# Patient Record
Sex: FEMALE | Race: WHITE | Employment: FULL TIME | URBAN - METROPOLITAN AREA
[De-identification: names, ages, dates, MRNs, and addresses within clinical notes are randomized per-mention and may not be internally consistent; named-entity substitution may affect disease eponyms.]

---

## 2017-03-07 ENCOUNTER — OFFICE VISIT (OUTPATIENT)
Dept: PRIMARY CARE CLINIC | Age: 57
End: 2017-03-07

## 2017-03-07 VITALS
RESPIRATION RATE: 18 BRPM | HEIGHT: 62 IN | WEIGHT: 111 LBS | BODY MASS INDEX: 20.43 KG/M2 | OXYGEN SATURATION: 100 % | TEMPERATURE: 98 F | SYSTOLIC BLOOD PRESSURE: 102 MMHG | HEART RATE: 78 BPM | DIASTOLIC BLOOD PRESSURE: 68 MMHG

## 2017-03-07 DIAGNOSIS — R59.9 SWOLLEN LYMPH NODES: ICD-10-CM

## 2017-03-07 DIAGNOSIS — J02.9 PHARYNGITIS, UNSPECIFIED ETIOLOGY: ICD-10-CM

## 2017-03-07 DIAGNOSIS — J02.9 SORE THROAT: Primary | ICD-10-CM

## 2017-03-07 LAB
S PYO AG THROAT QL: NEGATIVE
VALID INTERNAL CONTROL?: YES

## 2017-03-08 NOTE — PATIENT INSTRUCTIONS
Swollen Lymph Nodes: Care Instructions  Your Care Instructions  Lymph nodes are small, bean-shaped glands throughout the body. They help your body fight germs and infections. Lymph nodes often swell when there is a problem such as an injury, infection, or tumor. · The nodes in your neck, under your chin, or behind your ears may swell when you have a cold or sore throat. · An injury or infection in a leg or foot can make the nodes in your groin swell. · Sometimes medicine can make lymph nodes swell, but this is rare. Treatment depends on what caused your nodes to swell. Usually the nodes return to normal size without a problem. Follow-up care is a key part of your treatment and safety. Be sure to make and go to all appointments, and call your doctor if you are having problems. Its also a good idea to know your test results and keep a list of the medicines you take. How can you care for yourself at home? · Take your medicines exactly as prescribed. Call your doctor if you think you are having a problem with your medicine. · Avoid irritation. ¨ Do not squeeze or pick at the lump. ¨ Do not stick a needle in it. · Prevent infection. Do not squeeze, drain, or puncture a painful lump. Doing this can irritate or inflame the lump, push any existing infection deeper into the skin, or cause severe bleeding. · Get extra rest. Slow down just a little from your usual routine. · Drink plenty of fluids, enough so that your urine is light yellow or clear like water. If you have kidney, heart, or liver disease and have to limit fluids, talk with your doctor before you increase the amount of fluids you drink. · Take an over-the-counter pain medicine, such as acetaminophen (Tylenol), ibuprofen (Advil, Motrin), or naproxen (Aleve). Read and follow all instructions on the label. · Do not take two or more pain medicines at the same time unless the doctor told you to.  Many pain medicines have acetaminophen, which is Tylenol. Too much acetaminophen (Tylenol) can be harmful. When should you call for help? Watch closely for changes in your health, and be sure to contact your doctor if:  · Your lymph nodes do not get smaller, or they get bigger. · The area becomes red and feels tender. · The nodes feel hard and do not move when you push on them. · You have a fever of 100° F.  · You have night sweats. · You lose weight without trying. Where can you learn more? Go to http://efrain-virgen.info/. Enter T332 in the search box to learn more about \"Swollen Lymph Nodes: Care Instructions. \"  Current as of: May 24, 2016  Content Version: 11.1  © 6112-0011 [employer], [employer]. Care instructions adapted under license by [employer] (which disclaims liability or warranty for this information). If you have questions about a medical condition or this instruction, always ask your healthcare professional. [place] [address] any warranty or liability for your use of this information.

## 2017-03-08 NOTE — PROGRESS NOTES
Ms. Parveen Singh is a 60y.o. year old female who had concerns including Sore Throat. HPI:  Chief Complaint   Patient presents with    Sore Throat     hurts to swallow   right sided throat pain   No fever or chills or body aches. Past Medical History:   Diagnosis Date    Anxiety     Endocrine disease     hypothyroid    Thyroid disease      Current Outpatient Prescriptions   Medication Sig Dispense    SYNTHROID 75 mcg tablet      venlafaxine-SR (EFFEXOR-XR) 75 mg capsule      acyclovir (ZOVIRAX) 800 mg tablet Take 800 mg by mouth every four (4) hours.  ondansetron (ZOFRAN ODT) 4 mg disintegrating tablet Take 1 Tab by mouth every eight (8) hours as needed for Nausea. 15 Tab    fluticasone (FLONASE) 50 mcg/actuation nasal spray 2 Sprays by Both Nostrils route daily. 1 Bottle    ESTRACE 0.01 % (0.1 mg/gram) vaginal cream      venlafaxine-SR (EFFEXOR-XR) 37.5 mg capsule      levothyroxine (SYNTHROID) 25 mcg tablet Take  by mouth Daily (before breakfast). No current facility-administered medications for this visit. Reviewed PmHx, RxHx, FmHx, SocHx, AllgHx and updated and dated in the chart. ROS: Negative except for BOLD  General: fever, chills, fatigue  Respiratory: cough, SOB, wheezing  Cardiovascular:  CP, palpitation, DENNIS, edema   Gastrointestinal: N/V/D, bleeding  Genito-Urinary: dysuria, hematuria  Musculoskeletal: muscle weakness, pain, swelling    OBJECTIVE:   Visit Vitals    /68 (BP 1 Location: Left arm, BP Patient Position: Sitting)    Pulse 78    Temp 98 °F (36.7 °C) (Oral)    Resp 18    Ht 5' 2\" (1.575 m)    Wt 111 lb (50.3 kg)    SpO2 100%    BMI 20.3 kg/m2     GEN: The patient appears well, alert, oriented x 3, in no distress. ENT: bilateral TM and canal normal.  Neck supple. Right submandibular adenopathy, no thyromegaly. DAREN. No post pharyngeal exudate  Lungs: clear bilaterally, good air entry, no wheezes, rhonchi or rales.    Cardiovascular: regular rate and rhythm. S1 and S2 normal, no murmurs,  Abdomen: + BS, soft without tenderness, guarding, rebound, mass or organomegaly. Extremities: no edema, normal peripheral pulses. .      Assessment/ Plan:       ICD-10-CM ICD-9-CM    1. Sore throat J02.9 462 AMB POC RAPID STREP A   2. Pharyngitis, unspecified etiology J02.9 462    3. Swollen lymph nodes R59.9 785.6        Strep is negative. Sx care. I have discussed the diagnosis with the patient and the intended plan as seen in the above orders. The patient has received an after-visit summary and questions were answered concerning future plans. Medication Side Effects and Warnings were discussed with patient. Follow-up Disposition:  Return if symptoms worsen or fail to improve.       Catalino Dent MD

## 2017-05-19 ENCOUNTER — HOSPITAL ENCOUNTER (OUTPATIENT)
Dept: ULTRASOUND IMAGING | Age: 57
Discharge: HOME | End: 2017-05-19
Attending: PHYSICIAN ASSISTANT
Payer: COMMERCIAL

## 2017-05-19 DIAGNOSIS — R11.2 NAUSEA WITH VOMITING: ICD-10-CM

## 2017-05-19 DIAGNOSIS — R53.83 FATIGUE: ICD-10-CM

## 2017-05-19 PROCEDURE — 76705 ECHO EXAM OF ABDOMEN: CPT

## 2017-10-03 ENCOUNTER — TELEPHONE (OUTPATIENT)
Dept: PRIMARY CARE CLINIC | Age: 57
End: 2017-10-03

## 2017-10-03 ENCOUNTER — OFFICE VISIT (OUTPATIENT)
Dept: PRIMARY CARE CLINIC | Age: 57
End: 2017-10-03

## 2017-10-03 VITALS
TEMPERATURE: 98.4 F | SYSTOLIC BLOOD PRESSURE: 104 MMHG | DIASTOLIC BLOOD PRESSURE: 65 MMHG | BODY MASS INDEX: 20.24 KG/M2 | HEART RATE: 71 BPM | WEIGHT: 110 LBS | RESPIRATION RATE: 16 BRPM | HEIGHT: 62 IN | OXYGEN SATURATION: 98 %

## 2017-10-03 DIAGNOSIS — H01.113: Primary | ICD-10-CM

## 2017-10-03 RX ORDER — DESONIDE 0.5 MG/G
OINTMENT TOPICAL 2 TIMES DAILY
Qty: 15 G | Refills: 0 | Status: SHIPPED | OUTPATIENT
Start: 2017-10-03 | End: 2017-10-17

## 2017-10-03 NOTE — TELEPHONE ENCOUNTER
[facility] called and said the Rx sent in for her is not covered by her insurance and would like to speak with someone about changing it to something else that will. Please give them a call back in regards to this at [phone].

## 2017-10-03 NOTE — MR AVS SNAPSHOT
Visit Information Date & Time Provider Department Dept. Phone Encounter #  
 10/3/2017  6:00 PM Abby Alex GerardoCorbin [id number] Upcoming Health Maintenance Date Due Hepatitis C Screening 1960 PAP AKA CERVICAL CYTOLOGY 4/6/1979 FOBT Q 1 YEAR AGE 50-75 4/6/2008 DTaP/Tdap/Td series (1 - Tdap) 5/21/2011 BREAST CANCER SCRN MAMMOGRAM 8/11/2011 INFLUENZA AGE 9 TO ADULT 8/1/2017 Allergies as of 10/3/2017  Review Complete On: 10/3/2017 By: Abby Gerardo MD  
  
 Severity Noted Reaction Type Reactions Morphine  11/06/2015    Nausea and Vomiting Current Immunizations  Never Reviewed Name Date  
 TD Vaccine 5/20/2011  7:12 PM  
  
 Not reviewed this visit You Were Diagnosed With   
  
 Codes Comments Dermatitis of eyelid, contact or allergic, right    -  Primary ICD-10-CM: H01.113 ICD-9-CM: 373.32 Vitals BP Pulse Temp Resp Height(growth percentile) Weight(growth percentile) 104/65 (BP 1 Location: Left arm, BP Patient Position: Sitting) 71 98.4 °F (36.9 °C) (Oral) 16 5' 2\" (1.575 m) 110 lb (49.9 kg) SpO2 BMI OB Status Smoking Status 98% 20.12 kg/m2 Postmenopausal Never Smoker Vitals History BMI and BSA Data Body Mass Index Body Surface Area  
 20.12 kg/m 2 1.48 m 2 Preferred Pharmacy Pharmacy Name Phone [id number] [address] [phone] Your Updated Medication List  
  
   
This list is accurate as of: 10/3/17  6:29 PM.  Always use your most recent med list.  
  
  
  
  
 acyclovir 800 mg tablet Commonly known as:  ZOVIRAX Take 800 mg by mouth every four (4) hours. desonide 0.05 % topical ointment Commonly known as:  Kirby Espino Apply  to affected area two (2) times a day for 14 days. ESTRACE 0.01 % (0.1 mg/gram) vaginal cream  
Generic drug:  estradiol fluticasone 50 mcg/actuation nasal spray Commonly known as:  Elijackiee Caller 2 Sprays by Both Nostrils route daily. * levothyroxine 25 mcg tablet Commonly known as:  SYNTHROID Take  by mouth Daily (before breakfast). * SYNTHROID 75 mcg tablet Generic drug:  levothyroxine MULTIVITAMIN PO Take  by mouth. ondansetron 4 mg disintegrating tablet Commonly known as:  ZOFRAN ODT Take 1 Tab by mouth every eight (8) hours as needed for Nausea. * venlafaxine-SR 37.5 mg capsule Commonly known as:  EFFEXOR-XR  
  
 * venlafaxine-SR 75 mg capsule Commonly known as:  EFFEXOR-XR Take 75 mg by mouth daily. * Notice: This list has 4 medication(s) that are the same as other medications prescribed for you. Read the directions carefully, and ask your doctor or other care provider to review them with you. Prescriptions Sent to Pharmacy Refills  
 desonide (DESOWEN) 0.05 % topical ointment 0 Sig: Apply  to affected area two (2) times a day for 14 days. Class: Normal  
 Pharmacy: [id number] [address]  #: [phone] Route: Topical  
  
Patient Instructions Dermatitis: Care Instructions Your Care Instructions Dermatitis is the general name used for any rash or inflammation of the skin. Different kinds of dermatitis cause different kinds of rashes. Common causes of a rash include new medicines, plants (such as poison oak or poison ivy), heat, and stress. Certain illnesses can also cause a rash. An allergic reaction to something that touches your skin, such as latex, nickel, or poison ivy, is called contact dermatitis. Contact dermatitis may also be caused by something that irritates the skin, such as bleach, a chemical, or soap. These types of rashes cannot be spread from person to person. How long your rash will last depends on what caused it. Rashes may last a few days or months. Follow-up care is a key part of your treatment and safety. Be sure to make and go to all appointments, and call your doctor if you are having problems. It's also a good idea to know your test results and keep a list of the medicines you take. How can you care for yourself at home? · Do not scratch the rash. Cut your nails short, and file them smooth. Or wear gloves if this helps keep you from scratching. · Wash the area with water only. Pat dry. · Put cold, wet cloths on the rash to reduce itching. · Keep cool, and stay out of the sun. · Leave the rash open to the air as much as possible. · If the rash itches, use hydrocortisone cream. Follow the directions on the label. Calamine lotion may help for plant rashes. · Take an over-the-counter antihistamine, such as diphenhydramine (Benadryl) or loratadine (Claritin), to help calm the itching. Read and follow all instructions on the label. · If your doctor prescribed a cream, use it as directed. If your doctor prescribed medicine, take it exactly as directed. When should you call for help? Call your doctor now or seek immediate medical care if: 
· You have symptoms of infection, such as: 
¨ Increased pain, swelling, warmth, or redness. ¨ Red streaks leading from the area. ¨ Pus draining from the area. ¨ A fever. · You have joint pain along with the rash. Watch closely for changes in your health, and be sure to contact your doctor if: 
· Your rash is changing or getting worse. · You are not getting better as expected. Where can you learn more? Go to http://efrain-virgen.info/. Enter [phone] in the search box to learn more about \"Dermatitis: Care Instructions. \" Current as of: October 13, 2016 Content Version: 11.3 © 1035-2452 [employer]. Care instructions adapted under license by [employer] (which disclaims liability or warranty for this information).  If you have questions about a medical condition or this instruction, always ask your healthcare professional. [address]  any warranty or liability for your use of this information. Introducing \[facility]\"" & HEALTH SERVICES! [facility] introduces Vertra patient portal. Now you can access parts of your medical record, email your doctor's office, and request medication refills online. 1. In your internet browser, go to [email or web address] 2. Click on the First Time User? Click Here link in the Sign In box. You will see the New Member Sign Up page. 3. Enter your Vertra Access Code exactly as it appears below. You will not need to use this code after youve completed the sign-up process. If you do not sign up before the expiration date, you must request a new code. · Vertra Access Code: [email or web address] Expires: 1/1/2018  6:29 PM 
 
4. Enter the last four digits of your Social Security Number (xxxx) and Date of Birth (mm/dd/yyyy) as indicated and click Submit. You will be taken to the next sign-up page. 5. Create a Vertra ID. This will be your Vertra login ID and cannot be changed, so think of one that is secure and easy to remember. 6. Create a Vertra password. You can change your password at any time. 7. Enter your Password Reset Question and Answer. This can be used at a later time if you forget your password. 8. Enter your e-mail address. You will receive e-mail notification when new information is available in [address] 9. Click Sign Up. You can now view and download portions of your medical record. 10. Click the Download Summary menu link to download a portable copy of your medical information. If you have questions, please visit the Frequently Asked Questions section of the Vertra website. Remember, Vertra is NOT to be used for urgent needs. For medical emergencies, dial 911. Now available from your iPhone and Android! Please provide this summary of care documentation to your next provider. If you have any questions after today's visit, please call [phone].

## 2017-10-03 NOTE — PROGRESS NOTES
Pt c/o of R eye redness. X2wks. Pt states some discharge. Pt declines itching and pain. Pt declines any injury to eye. Pt states some puffiness in the morning. Pt states the redness is not spreading.

## 2017-10-03 NOTE — PROGRESS NOTES
HISTORY OF PRESENT ILLNESS  Nalini Zelaya is a 59 y.o. female. HPI  Presents for erythema around the right eye. Started 2 weeks ago. States tearduct was clogged, so she has been using artifical tears to help. Noticed redness of the eyelid thereafter and itchiness. She has tried allergy meds. She denies any eyeball redness. She tried an old  steroid cream which worsened the redness. Denies any visual changes. No left eyelid or eye redness or pain. No recent URI symptoms. She has hx of eczema but no recent flares. Review of Systems   Constitutional: Negative for chills and fever. HENT: Negative for congestion, ear pain and sore throat. Eyes: Negative for double vision, photophobia, pain, discharge and redness. As in HPI   Respiratory: Negative for cough and stridor. Cardiovascular: Negative for chest pain and palpitations. Gastrointestinal: Negative for abdominal pain, diarrhea and nausea. Genitourinary: Negative for dysuria, frequency and urgency. Musculoskeletal: Negative for back pain, falls and neck pain. Skin: Positive for rash (right eyelid). Neurological: Negative for tingling, sensory change, focal weakness, weakness and headaches. Past Medical History:   Diagnosis Date    Anxiety     Endocrine disease     hypothyroid    Thyroid disease      History reviewed. No pertinent surgical history.   Social History     Social History    Marital status:      Spouse name: N/A    Number of children: N/A    Years of education: N/A     Social History Main Topics    Smoking status: Never Smoker    Smokeless tobacco: Never Used    Alcohol use Yes      Comment: rarealy    Drug use: No    Sexual activity: Yes     Partners: Male     Birth control/ protection: Condom     Other Topics Concern    None     Social History Narrative    ** Merged History Encounter **          Family History   Problem Relation Age of Onset    Hypertension Mother     No Known Problems Father     No Known Problems Sister     No Known Problems Brother     No Known Problems Maternal Aunt     No Known Problems Maternal Uncle     No Known Problems Paternal Aunt     No Known Problems Paternal Uncle     Diabetes Maternal Grandmother     Cancer Maternal Grandfather     Cancer Paternal Grandmother     No Known Problems Paternal Grandfather      Current Outpatient Prescriptions on File Prior to Visit   Medication Sig Dispense Refill    SYNTHROID 75 mcg tablet       venlafaxine-SR (EFFEXOR-XR) 75 mg capsule Take 75 mg by mouth daily. 2    levothyroxine (SYNTHROID) 25 mcg tablet Take  by mouth Daily (before breakfast). No current facility-administered medications on file prior to visit. Allergies   Allergen Reactions    Morphine Nausea and Vomiting       Physical Exam   Constitutional: She is oriented to person, place, and time. She appears well-developed and well-nourished. No distress. /65 (BP 1 Location: Left arm, BP Patient Position: Sitting)  Pulse 71  Temp 98.4 °F (36.9 °C) (Oral)   Resp 16  Ht 5' 2\" (1.575 m)  Wt 110 lb (49.9 kg)  SpO2 98%  BMI 20.12 kg/m2   HENT:   Head: Normocephalic and atraumatic. Right Ear: Tympanic membrane normal.   Left Ear: Tympanic membrane normal.   Nose: Nose normal.   Mouth/Throat: Oropharynx is clear and moist. No oropharyngeal exudate. Eyes: Conjunctivae and EOM are normal. Pupils are equal, round, and reactive to light. Lids are everted and swept, no foreign bodies found. No foreign body present in the right eye. No foreign body present in the left eye. No scleral icterus. Well demarcated erythematous scaly plaque of the right upper and lower eyelids. Left eyelids normal. No edema or tenderness to palpation. Neck: Normal range of motion. Neck supple. Cardiovascular: Normal rate, regular rhythm, normal heart sounds and intact distal pulses. No murmur heard. Pulmonary/Chest: Effort normal and breath sounds normal. No stridor.  No respiratory distress. She has no wheezes. Abdominal: She exhibits no distension. Musculoskeletal: Normal range of motion. She exhibits no edema or tenderness. Neurological: She is alert and oriented to person, place, and time. No cranial nerve deficit. Skin: Skin is warm and dry. Rash (see HENT) noted. Psychiatric: She has a normal mood and affect. Her behavior is normal.   Nursing note and vitals reviewed. ASSESSMENT and PLAN    ICD-10-CM ICD-9-CM    1. Dermatitis of eyelid, contact or allergic, right H01.113 373.32 MULTIVITAMIN PO      desonide (DESOWEN) 0.05 % topical ointment     Likely atopic dermatitis (known hx) vs. contact dermatitis (possibly preservative in artificial tears). Advised to stop using artifical tears for now. Given involvement of the face would prefer using tacrolimus cream however does not appear to be covered by her insurance (needs PA). Will use a weak steroidal class VI cream BID for upto 1-2 weeks max, if no improvement by then advised to see dermatology. ED warnings discussed. This note will not be viewable in [address]

## 2017-10-03 NOTE — PATIENT INSTRUCTIONS
Dermatitis: Care Instructions  Your Care Instructions  Dermatitis is the general name used for any rash or inflammation of the skin. Different kinds of dermatitis cause different kinds of rashes. Common causes of a rash include new medicines, plants (such as poison oak or poison ivy), heat, and stress. Certain illnesses can also cause a rash. An allergic reaction to something that touches your skin, such as latex, nickel, or poison ivy, is called contact dermatitis. Contact dermatitis may also be caused by something that irritates the skin, such as bleach, a chemical, or soap. These types of rashes cannot be spread from person to person. How long your rash will last depends on what caused it. Rashes may last a few days or months. Follow-up care is a key part of your treatment and safety. Be sure to make and go to all appointments, and call your doctor if you are having problems. It's also a good idea to know your test results and keep a list of the medicines you take. How can you care for yourself at home? · Do not scratch the rash. Cut your nails short, and file them smooth. Or wear gloves if this helps keep you from scratching. · Wash the area with water only. Pat dry. · Put cold, wet cloths on the rash to reduce itching. · Keep cool, and stay out of the sun. · Leave the rash open to the air as much as possible. · If the rash itches, use hydrocortisone cream. Follow the directions on the label. Calamine lotion may help for plant rashes. · Take an over-the-counter antihistamine, such as diphenhydramine (Benadryl) or loratadine (Claritin), to help calm the itching. Read and follow all instructions on the label. · If your doctor prescribed a cream, use it as directed. If your doctor prescribed medicine, take it exactly as directed. When should you call for help?   Call your doctor now or seek immediate medical care if:  · You have symptoms of infection, such as:  ¨ Increased pain, swelling, warmth, or redness. ¨ Red streaks leading from the area. ¨ Pus draining from the area. ¨ A fever. · You have joint pain along with the rash. Watch closely for changes in your health, and be sure to contact your doctor if:  · Your rash is changing or getting worse. · You are not getting better as expected. Where can you learn more? Go to http://efrain-virgen.info/. Enter [phone] in the search box to learn more about \"Dermatitis: Care Instructions. \"  Current as of: October 13, 2016  Content Version: 11.3  © 4341-5478 [employer]. Care instructions adapted under license by [employer] (which disclaims liability or warranty for this information). If you have questions about a medical condition or this instruction, always ask your healthcare professional. [address] any warranty or liability for your use of this information.

## 2017-11-15 ENCOUNTER — OFFICE VISIT (OUTPATIENT)
Dept: PRIMARY CARE CLINIC | Age: 57
End: 2017-11-15

## 2017-11-15 VITALS
BODY MASS INDEX: 20.43 KG/M2 | HEART RATE: 70 BPM | HEIGHT: 62 IN | DIASTOLIC BLOOD PRESSURE: 75 MMHG | TEMPERATURE: 97.8 F | SYSTOLIC BLOOD PRESSURE: 121 MMHG | RESPIRATION RATE: 18 BRPM | OXYGEN SATURATION: 97 % | WEIGHT: 111 LBS

## 2017-11-15 DIAGNOSIS — J06.9 URI, ACUTE: ICD-10-CM

## 2017-11-15 DIAGNOSIS — H66.91 RIGHT ACUTE OTITIS MEDIA: Primary | ICD-10-CM

## 2017-11-15 LAB
S PYO AG THROAT QL: NEGATIVE
VALID INTERNAL CONTROL?: YES

## 2017-11-15 RX ORDER — AMOXICILLIN AND CLAVULANATE POTASSIUM 875; 125 MG/1; MG/1
1 TABLET, FILM COATED ORAL EVERY 12 HOURS
Qty: 20 TAB | Refills: 0 | Status: SHIPPED | OUTPATIENT
Start: 2017-11-15 | End: 2017-11-25

## 2017-11-15 NOTE — PROGRESS NOTES
Chief Complaint   Patient presents with    Sore Throat     for 2 days    Ear Pain     right ear pain today

## 2017-11-15 NOTE — PROGRESS NOTES
Subjective:   Sotero Espinosa is a 59 y.o. female who complains of congestion, sore throat and dry cough for 4 days, gradually worsening since that time. \". More recently developed some ear discomfort. She missed work today due to illness. Denies any fevers. 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 She did not get a flu vaccine and \"doesn't believe in them\". She denies a history of shortness of breath and wheezing. Evaluation to date: none. Treatment to date: OTC products - Dayquil, Nyquil with some relief of sx's. Patient does not smoke cigarettes. Relevant PMH:   Past Medical History:   Diagnosis Date    Anxiety     Endocrine disease     hypothyroid    Thyroid disease      History reviewed. No pertinent surgical history. Allergies   Allergen Reactions    Morphine Nausea and Vomiting       Review of Systems  Pertinent items are noted in HPI. Objective:     Visit Vitals    /75 (BP 1 Location: Right arm, BP Patient Position: Sitting)    Pulse 70    Temp 97.8 °F (36.6 °C) (Oral)    Resp 18    Ht 5' 2\" (1.575 m)    Wt 111 lb (50.3 kg)    SpO2 97%    BMI 20.3 kg/m2     General:  alert, cooperative, no distress   Eyes: negative   Ears: abnormal TM AS - erythematous, bulging, serous middle ear fluid. TM AD - normal.   Sinuses: Normal paranasal sinuses without tenderness   Mouth:  Lips, mucosa, and tongue normal. Teeth and gums normal and abnormal findings: mild oropharyngeal erythema   Neck: supple, symmetrical, trachea midline and no adenopathy. Heart: S1 and S2 normal, no murmurs noted. Lungs: clear to auscultation bilaterally        Results for orders placed or performed in visit on 11/15/17   AMB POC RAPID STREP A   Result Value Ref Range    VALID INTERNAL CONTROL POC Yes     Group A Strep Ag Negative Negative       Assessment/Plan:       ICD-10-CM ICD-9-CM    1.  Right acute otitis media H66.91 382.9 AMB POC RAPID STREP A   2. URI, acute J06.9 465.9 Orders Placed This Encounter    AMB POC RAPID STREP A    amoxicillin-clavulanate (AUGMENTIN) 875-125 mg per tablet     Suggested symptomatic OTC remedies. Antibiotics per orders. RTC prn. Gregoria Leal, NP  This note will not be viewable in [address]

## 2017-11-15 NOTE — MR AVS SNAPSHOT
Visit Information Date & Time Provider Department Dept. Phone Encounter #  
 11/15/2017  4:30 PM Micki Dyer NP [address] [facility] 1368 [phone] [id number] Follow-up Instructions Return if symptoms worsen or fail to improve. Upcoming Health Maintenance Date Due Hepatitis C Screening 1960 PAP AKA CERVICAL CYTOLOGY 4/6/1979 FOBT Q 1 YEAR AGE 50-75 4/6/2008 DTaP/Tdap/Td series (1 - Tdap) 5/21/2011 BREAST CANCER SCRN MAMMOGRAM 8/11/2011 Allergies as of 11/15/2017  Review Complete On: 11/15/2017 By: Micki Dyer NP Severity Noted Reaction Type Reactions Morphine  11/06/2015    Nausea and Vomiting Current Immunizations  Never Reviewed Name Date  
 TD Vaccine 5/20/2011  7:12 PM  
  
 Not reviewed this visit You Were Diagnosed With   
  
 Codes Comments Right acute otitis media    -  Primary ICD-10-CM: H66.91 
ICD-9-CM: 382.9   
 URI, acute     ICD-10-CM: J06.9 ICD-9-CM: 465.9 Vitals BP Pulse Temp Resp Height(growth percentile) Weight(growth percentile) 121/75 (BP 1 Location: Right arm, BP Patient Position: Sitting) 70 97.8 °F (36.6 °C) (Oral) 18 5' 2\" (1.575 m) 111 lb (50.3 kg) SpO2 BMI OB Status Smoking Status 97% 20.3 kg/m2 Postmenopausal Never Smoker BMI and BSA Data Body Mass Index Body Surface Area  
 20.3 kg/m 2 1.48 m 2 Preferred Pharmacy Pharmacy Name Phone [facility][id number]- [facility]  [phone] Your Updated Medication List  
  
   
This list is accurate as of: 11/15/17  4:50 PM.  Always use your most recent med list.  
  
  
  
  
 amoxicillin-clavulanate 875-125 mg per tablet Commonly known as:  AUGMENTIN Take 1 Tab by mouth every twelve (12) hours for 10 days. MULTIVITAMIN PO Take  by mouth. SYNTHROID 75 mcg tablet Generic drug:  levothyroxine  
  
 venlafaxine-SR 75 mg capsule Commonly known as:  EFFEXOR-XR Take 75 mg by mouth daily. Prescriptions Sent to Pharmacy Refills  
 amoxicillin-clavulanate (AUGMENTIN) 875-125 mg per tablet 0 Sig: Take 1 Tab by mouth every twelve (12) hours for 10 days. Class: Normal  
 Pharmacy: [facility]/pharmacy #[id number]- [address]  #: [phone] Route: Oral  
  
We Performed the Following AMB POC RAPID STREP A [80729 CPT(R)] Follow-up Instructions Return if symptoms worsen or fail to improve. Patient Instructions Ear Infection (Otitis Media): Care Instructions Your Care Instructions An ear infection may start with a cold and affect the middle ear (otitis media). It can hurt a lot. Most ear infections clear up on their own in a couple of days. Most often you will not need antibiotics. This is because many ear infections are caused by a virus. Antibiotics don't work against a virus. Regular doses of pain medicines are the best way to reduce your fever and help you feel better. Follow-up care is a key part of your treatment and safety. Be sure to make and go to all appointments, and call your doctor if you are having problems. It's also a good idea to know your test results and keep a list of the medicines you take. How can you care for yourself at home? · Take pain medicines exactly as directed. ¨ If the doctor gave you a prescription medicine for pain, take it as prescribed. ¨ If you are not taking a prescription pain medicine, take an over-the-counter medicine, such as acetaminophen (Tylenol), ibuprofen (Advil, Motrin), or naproxen (Aleve). Read and follow all instructions on the label. ¨ Do not take two or more pain medicines at the same time unless the doctor told you to. Many pain medicines have acetaminophen, which is Tylenol. Too much acetaminophen (Tylenol) can be harmful. · Plan to take a full dose of pain reliever before bedtime. Getting enough sleep will help you get better. · Try a warm, moist washcloth on the ear. It may help relieve pain. · If your doctor prescribed antibiotics, take them as directed. Do not stop taking them just because you feel better. You need to take the full course of antibiotics. When should you call for help? Call your doctor now or seek immediate medical care if: 
? · You have new or increasing ear pain. ? · You have new or increasing pus or blood draining from your ear. ? · You have a fever with a stiff neck or a severe headache. ? Watch closely for changes in your health, and be sure to contact your doctor if: 
? · You have new or worse symptoms. ? · You are not getting better after taking an antibiotic for 2 days. Where can you learn more? Go to http://efrain-virgen.info/. Enter I884 in the search box to learn more about \"Ear Infection (Otitis Media): Care Instructions. \" Current as of: May 12, 2017 Content Version: 11.4 © 5149-5477 [employer]. Care instructions adapted under license by [employer] (which disclaims liability or warranty for this information). If you have questions about a medical condition or this instruction, always ask your healthcare professional. [address] any warranty or liability for your use of this information. Introducing [facility]! Rafael Arrieta introduces LeadCloud patient portal. Now you can access parts of your medical record, email your doctor's office, and request medication refills online. 1. In your internet browser, go to [email or web address]://"[email or web address] ([email or web address])". [employer]konni [employer] 2. Click on the First Time User? Click Here link in the Sign In box. You will see the New Member Sign Up page. 3. Enter your LeadCloud Access Code exactly as it appears below. You will not need to use this code after youve completed the sign-up process.  If you do not sign up before the expiration date, you must request a new code. · [employer] Access Code: [email or web address] Expires: 1/1/2018  5:29 PM 
 
4. Enter the last four digits of your Social Security Number (xxxx) and Date of Birth (mm/dd/yyyy) as indicated and click Submit. You will be taken to the next sign-up page. 5. Create a [employer] ID. This will be your [employer] login ID and cannot be changed, so think of one that is secure and easy to remember. 6. Create a [employer] password. You can change your password at any time. 7. Enter your Password Reset Question and Answer. This can be used at a later time if you forget your password. 8. Enter your e-mail address. You will receive e-mail notification when new information is available in [address]. 9. Click Sign Up. You can now view and download portions of your medical record. 10. Click the Download Summary menu link to download a portable copy of your medical information. If you have questions, please visit the Frequently Asked Questions section of the [employer] website. Remember, [employer] is NOT to be used for urgent needs. For medical emergencies, dial 911. Now available from your iPhone and Android! Please provide this summary of care documentation to your next provider. Your primary care clinician is listed as Herbie Covington. If you have any questions after today's visit, please call [phone].

## 2017-11-15 NOTE — PATIENT INSTRUCTIONS
Ear Infection (Otitis Media): Care Instructions  Your Care Instructions    An ear infection may start with a cold and affect the middle ear (otitis media). It can hurt a lot. Most ear infections clear up on their own in a couple of days. Most often you will not need antibiotics. This is because many ear infections are caused by a virus. Antibiotics don't work against a virus. Regular doses of pain medicines are the best way to reduce your fever and help you feel better. Follow-up care is a key part of your treatment and safety. Be sure to make and go to all appointments, and call your doctor if you are having problems. It's also a good idea to know your test results and keep a list of the medicines you take. How can you care for yourself at home? · Take pain medicines exactly as directed. ¨ If the doctor gave you a prescription medicine for pain, take it as prescribed. ¨ If you are not taking a prescription pain medicine, take an over-the-counter medicine, such as acetaminophen (Tylenol), ibuprofen (Advil, Motrin), or naproxen (Aleve). Read and follow all instructions on the label. ¨ Do not take two or more pain medicines at the same time unless the doctor told you to. Many pain medicines have acetaminophen, which is Tylenol. Too much acetaminophen (Tylenol) can be harmful. · Plan to take a full dose of pain reliever before bedtime. Getting enough sleep will help you get better. · Try a warm, moist washcloth on the ear. It may help relieve pain. · If your doctor prescribed antibiotics, take them as directed. Do not stop taking them just because you feel better. You need to take the full course of antibiotics. When should you call for help? Call your doctor now or seek immediate medical care if:  ? · You have new or increasing ear pain. ? · You have new or increasing pus or blood draining from your ear. ? · You have a fever with a stiff neck or a severe headache. ? Watch closely for changes in your health, and be sure to contact your doctor if:  ? · You have new or worse symptoms. ? · You are not getting better after taking an antibiotic for 2 days. Where can you learn more? Go to http://efrain-virgen.info/. Enter N232 in the search box to learn more about \"Ear Infection (Otitis Media): Care Instructions. \"  Current as of: May 12, 2017  Content Version: 11.4  © 3960-2623 Healthwise, [employer]aby. Care instructions adapted under license by [employer] (which disclaims liability or warranty for this information). If you have questions about a medical condition or this instruction, always ask your healthcare professional. [place] [address] any warranty or liability for your use of this information.

## 2018-01-08 ENCOUNTER — OFFICE VISIT (OUTPATIENT)
Dept: PRIMARY CARE CLINIC | Age: 58
End: 2018-01-08

## 2018-01-08 VITALS
HEIGHT: 62 IN | BODY MASS INDEX: 19.51 KG/M2 | HEART RATE: 74 BPM | TEMPERATURE: 97.6 F | DIASTOLIC BLOOD PRESSURE: 59 MMHG | OXYGEN SATURATION: 98 % | WEIGHT: 106 LBS | RESPIRATION RATE: 18 BRPM | SYSTOLIC BLOOD PRESSURE: 101 MMHG

## 2018-01-08 DIAGNOSIS — R53.83 FATIGUE, UNSPECIFIED TYPE: Primary | ICD-10-CM

## 2018-01-08 NOTE — PROGRESS NOTES
Pt later decided she no longer wanted to have labs drawn and that she will f/u with her pcp since she is feeling better

## 2018-01-08 NOTE — PROGRESS NOTES
Anitha Denson is a 59 y.o. female   Chief Complaint   Patient presents with    Fatigue     off and on for 1 month, worse this weekend    Vomiting     for 2 days    pt states she has been more fatigued and drained recently. Pt also with some nausea vomiting but this seems to be an ongoing issue and had an EGD which was normal.  Pt also reports having a hx of anemia. Pt has been taking iron pills the past couple days. Anemia was a long time ago per patient. she is a 59y.o. year old female who presents for evalution. Reviewed PmHx, RxHx, FmHx, SocHx, AllgHx and updated and dated in the chart. Review of Systems - negative except as listed above in the HPI    Objective:     Vitals:    01/08/18 0834   BP: 101/59   Pulse: 74   Resp: 18   Temp: 97.6 °F (36.4 °C)   TempSrc: Oral   SpO2: 98%   Weight: 106 lb (48.1 kg)   Height: 5' 2\" (1.575 m)       Current Outpatient Prescriptions   Medication Sig    MULTIVITAMIN PO Take  by mouth.  SYNTHROID 75 mcg tablet     venlafaxine-SR (EFFEXOR-XR) 75 mg capsule Take 75 mg by mouth daily. No current facility-administered medications for this visit. Physical Examination: General appearance - alert, well appearing, and in no distress  Mental status - alert, oriented to person, place, and time  Eyes - pupils equal and reactive, extraocular eye movements intact  Ears - bilateral TM's and external ear canals normal  Mouth - mucous membranes moist, pharynx normal without lesions  Neck - supple, no significant adenopathy, thyroid exam: thyroid is normal in size without nodules or tenderness  Chest - clear to auscultation, no wheezes, rales or rhonchi, symmetric air entry  Heart - normal rate, regular rhythm, normal S1, S2, no murmurs, rubs, clicks or gallops  Abdomen - soft, nontender, nondistended, no masses or organomegaly      Assessment/ Plan:   Diagnoses and all orders for this visit:    1.  Fatigue, unspecified type  -     CBC WITH AUTOMATED DIFF  - TSH 3RD GENERATION     f/u pcp  Follow-up Disposition:  Return if symptoms worsen or fail to improve. I have discussed the diagnosis with the patient and the intended plan as seen in the above orders. The patient has received an after-visit summary and questions were answered concerning future plans. Pt conveyed understanding of plan.     Medication Side Effects and Warnings were discussed with patient      Tavo Jernigan DO

## 2018-01-08 NOTE — MR AVS SNAPSHOT
Visit Information Date & Time Provider Department Dept. Phone Encounter #  
 1/8/2018  8:30 AM Nasir Anderson, [address] [id number] Follow-up Instructions Return if symptoms worsen or fail to improve. Upcoming Health Maintenance Date Due Hepatitis C Screening 1960 PAP AKA CERVICAL CYTOLOGY 4/6/1979 FOBT Q 1 YEAR AGE 50-75 4/6/2008 DTaP/Tdap/Td series (1 - Tdap) 5/21/2011 BREAST CANCER SCRN MAMMOGRAM 8/11/2011 Allergies as of 1/8/2018  Review Complete On: 1/8/2018 By: Olivia Lerma LPN Severity Noted Reaction Type Reactions Morphine  11/06/2015    Nausea and Vomiting Current Immunizations  Never Reviewed Name Date  
 TD Vaccine 5/20/2011  7:12 PM  
  
 Not reviewed this visit You Were Diagnosed With   
  
 Codes Comments Fatigue, unspecified type    -  Primary ICD-10-CM: R53.83 ICD-9-CM: 780.79 Vitals BP Pulse Temp Resp Height(growth percentile) Weight(growth percentile) 101/59 (BP 1 Location: Right arm, BP Patient Position: Sitting) 74 97.6 °F (36.4 °C) (Oral) 18 5' 2\" (1.575 m) 106 lb (48.1 kg) SpO2 BMI OB Status Smoking Status 98% 19.39 kg/m2 Postmenopausal Never Smoker BMI and BSA Data Body Mass Index Body Surface Area  
 19.39 kg/m 2 1.45 m 2 Preferred Pharmacy Pharmacy Name Phone [id number]- [place] [id number] [facility] [phone] Your Updated Medication List  
  
   
This list is accurate as of: 1/8/18  8:53 AM.  Always use your most recent med list.  
  
  
  
  
 MULTIVITAMIN PO Take  by mouth. SYNTHROID 75 mcg tablet Generic drug:  levothyroxine  
  
 venlafaxine-SR 75 mg capsule Commonly known as:  EFFEXOR-XR Take 75 mg by mouth daily. We Performed the Following CBC WITH AUTOMATED DIFF [57981 CPT(R)] TSH 3RD GENERATION [35711 CPT(R)] Follow-up Instructions Return if symptoms worsen or fail to improve. Patient Instructions A Healthy Lifestyle: Care Instructions Your Care Instructions A healthy lifestyle can help you feel good, stay at a healthy weight, and have plenty of energy for both work and play. A healthy lifestyle is something you can share with your whole family. A healthy lifestyle also can lower your risk for serious health problems, such as high blood pressure, heart disease, and diabetes. You can follow a few steps listed below to improve your health and the health of your family. Follow-up care is a key part of your treatment and safety. Be sure to make and go to all appointments, and call your doctor if you are having problems. It's also a good idea to know your test results and keep a list of the medicines you take. How can you care for yourself at home? · Do not eat too much sugar, fat, or fast foods. You can still have dessert and treats now and then. The goal is moderation. · Start small to improve your eating habits. Pay attention to portion sizes, drink less juice and soda pop, and eat more fruits and vegetables. ¨ Eat a healthy amount of food. A 3-ounce serving of meat, for example, is about the size of a deck of cards. Fill the rest of your plate with vegetables and whole grains. ¨ Limit the amount of soda and sports drinks you have every day. Drink more water when you are thirsty. ¨ Eat at least 5 servings of fruits and vegetables every day. It may seem like a lot, but it is not hard to reach this goal. A serving or helping is 1 piece of fruit, 1 cup of vegetables, or 2 cups of leafy, raw vegetables. Have an apple or some carrot sticks as an afternoon snack instead of a candy bar. Try to have fruits and/or vegetables at every meal. 
· Make exercise part of your daily routine. You may want to start with simple activities, such as walking, bicycling, or slow swimming.  Try to be active 30 to 60 minutes every day. You do not need to do all 30 to 60 minutes all at once. For example, you can exercise 3 times a day for 10 or 20 minutes. Moderate exercise is safe for most people, but it is always a good idea to talk to your doctor before starting an exercise program. 
· Keep moving. Israel Murry the lawn, work in the garden, or PEMRED. Take the stairs instead of the elevator at work. · If you smoke, quit. People who smoke have an increased risk for heart attack, stroke, cancer, and other lung illnesses. Quitting is hard, but there are ways to boost your chance of quitting tobacco for good. ¨ Use nicotine gum, patches, or lozenges. ¨ Ask your doctor about stop-smoking programs and medicines. ¨ Keep trying. In addition to reducing your risk of diseases in the future, you will notice some benefits soon after you stop using tobacco. If you have shortness of breath or asthma symptoms, they will likely get better within a few weeks after you quit. · Limit how much alcohol you drink. Moderate amounts of alcohol (up to 2 drinks a day for men, 1 drink a day for women) are okay. But drinking too much can lead to liver problems, high blood pressure, and other health problems. Family health If you have a family, there are many things you can do together to improve your health. · Eat meals together as a family as often as possible. · Eat healthy foods. This includes fruits, vegetables, lean meats and dairy, and whole grains. · Include your family in your fitness plan. Most people think of activities such as jogging or tennis as the way to fitness, but there are many ways you and your family can be more active. Anything that makes you breathe hard and gets your heart pumping is exercise. Here are some tips: 
¨ Walk to do errands or to take your child to school or the bus. ¨ Go for a family bike ride after dinner instead of watching TV. Where can you learn more? Go to http://efrain-virgen.info/. Enter C912 in the search box to learn more about \"A Healthy Lifestyle: Care Instructions. \" Current as of: May 12, 2017 Content Version: 11.4 © 8101-8926 Healthwise, [employer]. Care instructions adapted under license by [employer] (which disclaims liability or warranty for this information). If you have questions about a medical condition or this instruction, always ask your healthcare professional. [address] any warranty or liability for your use of this information. Introducing [facility]! Carmelita Fothergill introduces Fareye patient portal. Now you can access parts of your medical record, email your doctor's office, and request medication refills online. 1. In your internet browser, go to [email or web address] 2. Click on the First Time User? Click Here link in the Sign In box. You will see the New Member Sign Up page. 3. Enter your Fareye Access Code exactly as it appears below. You will not need to use this code after youve completed the sign-up process. If you do not sign up before the expiration date, you must request a new code. · F[employer]ye Access Code: [email or web address] Expires: 4/8/2018  8:53 AM 
 
4. Enter the last four digits of your Social Security Number (xxxx) and Date of Birth (mm/dd/yyyy) as indicated and click Submit. You will be taken to the next sign-up page. 5. Create a Fareye ID. This will be your Fareye login ID and cannot be changed, so think of one that is secure and easy to remember. 6. Create a Fareye password. You can change your password at any time. 7. Enter your Password Reset Question and Answer. This can be used at a later time if you forget your password. 8. Enter your e-mail address. You will receive e-mail notification when new information is available in [address] 9. Click Sign Up.  You can now view and download portions of your medical record. 10. Click the Download Summary menu link to download a portable copy of your medical information. If you have questions, please visit the Frequently Asked Questions section of the [employer] website. Remember, [employer] is NOT to be used for urgent needs. For medical emergencies, dial 911. Now available from your iPhone and Android! Please provide this summary of care documentation to your next provider. Your primary care clinician is listed as Yolande Rhoades. If you have any questions after today's visit, please call [phone].

## 2018-01-08 NOTE — PROGRESS NOTES
Chief Complaint   Patient presents with    Fatigue     off and on for 1 month, worse this weekend    Vomiting     for 2 days

## 2018-01-08 NOTE — PATIENT INSTRUCTIONS

## 2018-01-25 ENCOUNTER — OFFICE VISIT (OUTPATIENT)
Dept: INTERNAL MEDICINE CLINIC | Age: 58
End: 2018-01-25

## 2018-01-25 VITALS
BODY MASS INDEX: 19.69 KG/M2 | RESPIRATION RATE: 16 BRPM | SYSTOLIC BLOOD PRESSURE: 128 MMHG | TEMPERATURE: 97.9 F | DIASTOLIC BLOOD PRESSURE: 76 MMHG | HEIGHT: 62 IN | HEART RATE: 70 BPM | WEIGHT: 107 LBS | OXYGEN SATURATION: 100 %

## 2018-01-25 DIAGNOSIS — K82.4 GALLBLADDER POLYP: Primary | ICD-10-CM

## 2018-01-25 DIAGNOSIS — E78.1 HIGH TRIGLYCERIDES: ICD-10-CM

## 2018-01-25 DIAGNOSIS — E03.9 ACQUIRED HYPOTHYROIDISM: ICD-10-CM

## 2018-01-25 NOTE — MR AVS SNAPSHOT
[address] [facility] [address] [facility] 83. 
[phone] Patient: Riky Stewart MRN: [id number] ASC:[birth year]92 Visit Information Date & Time Provider Department Dept. Phone Encounter #  
 1/25/2018  3:00 PM Nohemi Miller, [address] [place] [address],[address] [phone] [id number] Follow-up Instructions Return in about 6 months (around 7/25/2018) for thyroid. Upcoming Health Maintenance Date Due Hepatitis C Screening 1960 PAP AKA CERVICAL CYTOLOGY 4/6/1979 FOBT Q 1 YEAR AGE 50-75 4/6/2008 DTaP/Tdap/Td series (1 - Tdap) 5/21/2011 BREAST CANCER SCRN MAMMOGRAM 8/11/2011 Allergies as of 1/25/2018  Review Complete On: 1/25/2018 By: Kenia Simms LPN Severity Noted Reaction Type Reactions Morphine  11/06/2015    Nausea and Vomiting Current Immunizations  Never Reviewed Name Date  
 TD Vaccine 5/20/2011  7:12 PM  
  
 Not reviewed this visit You Were Diagnosed With   
  
 Codes Comments Gallbladder polyp    -  Primary ICD-10-CM: K82.4 ICD-9-CM: 575.6 Acquired hypothyroidism     ICD-10-CM: E03.9 ICD-9-CM: 244.9 High triglycerides     ICD-10-CM: E78.1 ICD-9-CM: 272.1 Vitals BP Pulse Temp Resp Height(growth percentile) Weight(growth percentile) 128/76 70 97.9 °F (36.6 °C) (Oral) 16 5' 2\" (1.575 m) 107 lb (48.5 kg) SpO2 BMI OB Status Smoking Status 100% 19.57 kg/m2 Postmenopausal Never Smoker Vitals History BMI and BSA Data Body Mass Index Body Surface Area  
 19.57 kg/m 2 1.46 m 2 Preferred Pharmacy Pharmacy Name Phone [address] [place] [address], [address] [place] [address] Vanda Roldan Said [phone] Your Updated Medication List  
  
   
This list is accurate as of: 1/25/18  3:48 PM.  Always use your most recent med list.  
  
  
  
  
 MULTIVITAMIN PO Take  by mouth. SYNTHROID 75 mcg tablet Generic drug:  levothyroxine  
  
 venlafaxine-SR 75 mg capsule Commonly known as:  EFFEXOR-XR Take 75 mg by mouth daily. We Performed the Following CBC WITH AUTOMATED DIFF [70923 CPT(R)] LIPID PANEL [07756 CPT(R)] METABOLIC PANEL, COMPREHENSIVE [64813 CPT(R)] REFERRAL TO SURGERY [NCZ774 Custom] Comments:  
 Gallbladder polyps TSH AND FREE T4 [15585 CPT(R)] Follow-up Instructions Return in about 6 months (around 7/25/2018) for thyroid. Referral Information Referral ID Referred By Referred To  
  
 [id number] Julia Corral MD   
   [address] [address] [address] [place] [address], [facility] Phone: [phone] Fax: [phone] Visits Status Start Date End Date 1 New Request 1/25/18 1/25/19 If your referral has a status of pending review or denied, additional information will be sent to support the outcome of this decision. Patient Instructions Make appointment with surgeon Return for fasting labs Introducing [facility] [place] [facility]! Kala Ramírez introduces [employer] patient portal. Now you can access parts of your medical record, email your doctor's office, and request medication refills online. 1. In your internet browser, go to [email or web address] 2. Click on the First Time User? Click Here link in the Sign In box. You will see the New Member Sign Up page. 3. Enter your [employer] Access Code exactly as it appears below. You will not need to use this code after youve completed the sign-up process. If you do not sign up before the expiration date, you must request a new code. · [employer] Access Code: [email or web address] Expires: 4/8/2018  8:53 AM 
 
4. Enter the last four digits of your Social Security Number (xxxx) and Date of Birth (mm/dd/yyyy) as indicated and click Submit. You will be taken to the next sign-up page. 5. Create a [employer] ID. This will be your [employer] login ID and cannot be changed, so think of one that is secure and easy to remember. 6. Create a [employer] password. You can change your password at any time. 7. Enter your Password Reset Question and Answer. This can be used at a later time if you forget your password. 8. Enter your e-mail address. You will receive e-mail notification when new information is available in [address] 9. Click Sign Up. You can now view and download portions of your medical record. 10. Click the Download Summary menu link to download a portable copy of your medical information. If you have questions, please visit the Frequently Asked Questions section of the [employer] website. Remember, [employer] is NOT to be used for urgent needs. For medical emergencies, dial 911. Now available from your iPhone and Android! Please provide this summary of care documentation to your next provider. Your primary care clinician is listed as Jeffrey Ebony. If you have any questions after today's visit, please call [phone].

## 2018-01-25 NOTE — PROGRESS NOTES
Ms. Ector Melo is a new patient who is here to establish care. CC:  New Patient; Establish Care; recurrent vomiting;  hypothyroidism      HPI:    Works as a Teacher in [employer]  Recently had triglycerides high and it was high, HDL and LDL were normal. Reports eating low fat in general. Avoids fried food. 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    Recalls pizza prior to one episode  Patient reports doing EGD which was normal   Last episode in first week of January   Denies acid reflux   Reviewed US done last year and noted to have gallstone polyps   Colonoscopy done less than 5 years ago and reports normal    Mammogram up to date/ pap smear up to date      Anxiety is stable on  venlafaxine   Review of systems:  Constitutional: negative for fever, chills, weight loss, night sweats   Eyes : negative for vision changes, eye pain and discharge  Nose and Throat: negative for tinnitus, sore throat   Cardiovascular: negative for chest pain, palpitations and shortness of breath  Respiratory: negative for shortness of breath, cough and wheezing   Gastroinstestinal: negative for abdominal pain, nausea, vomiting, diarrhea, constipation, and blood in the stool  Musculoskeletal: negative for back ache and joint ache   Genitourinary: negative for dysuria, nocturia, polyuria and hematuria   Neurologic: Negative for focal weakness, numbness or incoordination  Skin: negative for rash, pruritus  Hematologic: negative for easy bruising      Past Medical History:   Diagnosis Date    Anxiety     Endocrine disease     hypothyroid    Thyroid disease         Past Surgical History:   Procedure Laterality Date    HX OVARIAN CYST REMOVAL         Allergies   Allergen Reactions    Morphine Nausea and Vomiting       Current Outpatient Prescriptions on File Prior to Visit   Medication Sig Dispense Refill    MULTIVITAMIN PO Take  by mouth.  SYNTHROID 75 mcg tablet       venlafaxine-SR (EFFEXOR-XR) 75 mg capsule Take 75 mg by mouth daily. 2     No current facility-administered medications on file prior to visit. family history includes Cancer in her maternal grandfather and paternal grandmother; Diabetes in her maternal grandmother; Hypertension in her mother; No Known Problems in her brother, father, maternal aunt, maternal uncle, paternal aunt, paternal grandfather, paternal uncle, and sister. Social History     Social History    Marital status:      Spouse name: N/A    Number of children: N/A    Years of education: N/A     Occupational History    Not on file. Social History Main Topics    Smoking status: Never Smoker    Smokeless tobacco: Never Used    Alcohol use Yes      Comment: rarealy    Drug use: No    Sexual activity: Yes     Partners: Male     Birth control/ protection: Condom     Other Topics Concern    Not on file     Social History Narrative    ** Merged History Encounter **            Visit Vitals    /76    Pulse 70    Temp 97.9 °F (36.6 °C) (Oral)    Resp 16    Ht 5' 2\" (1.575 m)    Wt 107 lb (48.5 kg)    SpO2 100%    BMI 19.57 kg/m2     General:  Well appearing female no acute distress  HEENT:   PERRL,normal conjunctiva. External ear and canals normal, TMs normal.  Hearing normal to voice. Nose without edema or discharge, normal septum. Lips, teeth, gums normal.  Oropharynx: no erythema, no exudates, no lesions, normal tongue. Neck:  Supple. Thyroid normal size, nontender, without nodules. No carotid bruit. No masses or lymphadenopathy  Respiratory: no respiratory distress,  no wheezing, no rhonchi, no rales. No chest wall tenderness. Cardiovascular:  RRR, normal S1S2, no murmur. Gastrointestinal: normal bowel sounds, soft, nontender, without masses. No hepatosplenomegaly.   Extremities +2 pulses, no edema, normal sensation   Musculoskeletal:  Normal gait. Normal digits and nails. Normal strength and tone, no atrophy, and no abnormal movement. Skin:  No rash, no lesions, no ulcers. Skin warm, normal turgor, without induration or nodules. Neuro:  A and OX4, fluent speech, cranial nerves normal 2-12. Sensation normal to light touch. DTR symmetrical  Psych:  Normal affect      Lab Results   Component Value Date/Time    WBC 5.0 08/06/2016 10:19 AM    HGB 14.2 08/06/2016 10:19 AM    HCT 43.4 08/06/2016 10:19 AM    PLATELET 597 93/98/4855 10:19 AM    MCV 92 08/06/2016 10:19 AM     Lab Results   Component Value Date/Time    Sodium 140 08/06/2016 10:19 AM    Potassium 4.8 08/06/2016 10:19 AM    Chloride 99 08/06/2016 10:19 AM    CO2 26 08/06/2016 10:19 AM    Anion gap 7 03/11/2015 06:05 AM    Glucose 94 08/06/2016 10:19 AM    BUN 13 08/06/2016 10:19 AM    Creatinine 0.84 08/06/2016 10:19 AM    BUN/Creatinine ratio 15 08/06/2016 10:19 AM    GFR est AA 89 08/06/2016 10:19 AM    GFR est non-AA 77 08/06/2016 10:19 AM    Calcium 9.9 08/06/2016 10:19 AM                    Assessment and Plan:   60 yo woman presenting to [facility] care  1. Gallbladder polyps  In the past 2 years has had 6 episodes of nausea, vomiting and epigastric pain ( not all episodes had abdominal pain) - possibly related to gallstones polyps? ? Today patient is asymptomatic. Will refer to surgeon for evaluation  - REFERRAL TO SURGERY    2. Acquired hypothyroidism   - patient is on synthroid and would like 3 month supply  - TSH AND FREE T4  - CBC WITH AUTOMATED DIFF  - METABOLIC PANEL, COMPREHENSIVE    3. High triglycerides  Reported will check cholesterol. Interesting there is an association between high triglycerides  - LIPID PANEL    4. Anxiety: stable on Venlafaxine   Follow-up Disposition:  Return in about 6 months (around 7/25/2018) for thyroid.      Katrin Reyna MD

## 2018-01-25 NOTE — PROGRESS NOTES
Chief Complaint   Patient presents with    New Patient    Establish Care    GERD     vomiting/reflux    Dizziness

## 2018-01-29 ENCOUNTER — TELEPHONE (OUTPATIENT)
Dept: INTERNAL MEDICINE CLINIC | Age: 58
End: 2018-01-29

## 2018-01-29 NOTE — TELEPHONE ENCOUNTER
#[phone] pt is coming in tomorrow for labs. She is seeing surgeon the next day. Pt has been sick again and would like to speak with DR. Bruno yet today if possible?

## 2018-01-29 NOTE — TELEPHONE ENCOUNTER
Called patient. Two patient identifiers verified. Patient states she started vomiting again over the weekend. Vomiting is becoming more frequent. She just wanted to update Dr. Franklin Ruffin.     Has an appointment with Dr. Elana Ruby on 1/31/18

## 2018-01-30 ENCOUNTER — APPOINTMENT (OUTPATIENT)
Dept: INTERNAL MEDICINE CLINIC | Age: 58
End: 2018-01-30

## 2018-01-31 ENCOUNTER — OFFICE VISIT (OUTPATIENT)
Dept: SURGERY | Age: 58
End: 2018-01-31

## 2018-01-31 VITALS
BODY MASS INDEX: 19.88 KG/M2 | SYSTOLIC BLOOD PRESSURE: 110 MMHG | TEMPERATURE: 98.8 F | OXYGEN SATURATION: 100 % | HEART RATE: 69 BPM | DIASTOLIC BLOOD PRESSURE: 73 MMHG | HEIGHT: 62 IN | RESPIRATION RATE: 12 BRPM | WEIGHT: 108 LBS

## 2018-01-31 DIAGNOSIS — K82.4 GALLBLADDER POLYP: Primary | ICD-10-CM

## 2018-01-31 DIAGNOSIS — R11.2 NON-INTRACTABLE VOMITING WITH NAUSEA, UNSPECIFIED VOMITING TYPE: ICD-10-CM

## 2018-01-31 LAB
ALBUMIN SERPL-MCNC: 4.1 G/DL (ref 3.5–5.5)
ALBUMIN/GLOB SERPL: 1.5 {RATIO} (ref 1.2–2.2)
ALP SERPL-CCNC: 73 IU/L (ref 39–117)
ALT SERPL-CCNC: 10 IU/L (ref 0–32)
AST SERPL-CCNC: 14 IU/L (ref 0–40)
BASOPHILS # BLD AUTO: 0 X10E3/UL (ref 0–0.2)
BASOPHILS NFR BLD AUTO: 1 %
BILIRUB SERPL-MCNC: 0.4 MG/DL (ref 0–1.2)
BUN SERPL-MCNC: 16 MG/DL (ref 6–24)
BUN/CREAT SERPL: 22 (ref 9–23)
CALCIUM SERPL-MCNC: 9.3 MG/DL (ref 8.7–10.2)
CHLORIDE SERPL-SCNC: 99 MMOL/L (ref 96–106)
CHOLEST SERPL-MCNC: 198 MG/DL (ref 100–199)
CO2 SERPL-SCNC: 27 MMOL/L (ref 18–29)
CREAT SERPL-MCNC: 0.74 MG/DL (ref 0.57–1)
EOSINOPHIL # BLD AUTO: 0.1 X10E3/UL (ref 0–0.4)
EOSINOPHIL NFR BLD AUTO: 2 %
ERYTHROCYTE [DISTWIDTH] IN BLOOD BY AUTOMATED COUNT: 13.8 % (ref 12.3–15.4)
GFR SERPLBLD CREATININE-BSD FMLA CKD-EPI: 103 ML/MIN/1.73
GFR SERPLBLD CREATININE-BSD FMLA CKD-EPI: 89 ML/MIN/1.73
GLOBULIN SER CALC-MCNC: 2.7 G/DL (ref 1.5–4.5)
GLUCOSE SERPL-MCNC: 84 MG/DL (ref 65–99)
HCT VFR BLD AUTO: 39.2 % (ref 34–46.6)
HDLC SERPL-MCNC: 69 MG/DL
HGB BLD-MCNC: 12.8 G/DL (ref 11.1–15.9)
IMM GRANULOCYTES # BLD: 0 X10E3/UL (ref 0–0.1)
IMM GRANULOCYTES NFR BLD: 0 %
LDLC SERPL CALC-MCNC: 120 MG/DL (ref 0–99)
LYMPHOCYTES # BLD AUTO: 1.4 X10E3/UL (ref 0.7–3.1)
LYMPHOCYTES NFR BLD AUTO: 41 %
MCH RBC QN AUTO: 30.1 PG (ref 26.6–33)
MCHC RBC AUTO-ENTMCNC: 32.7 G/DL (ref 31.5–35.7)
MCV RBC AUTO: 92 FL (ref 79–97)
MONOCYTES # BLD AUTO: 0.2 X10E3/UL (ref 0.1–0.9)
MONOCYTES NFR BLD AUTO: 6 %
NEUTROPHILS # BLD AUTO: 1.8 X10E3/UL (ref 1.4–7)
NEUTROPHILS NFR BLD AUTO: 50 %
PLATELET # BLD AUTO: 328 X10E3/UL (ref 150–379)
POTASSIUM SERPL-SCNC: 4.5 MMOL/L (ref 3.5–5.2)
PROT SERPL-MCNC: 6.8 G/DL (ref 6–8.5)
RBC # BLD AUTO: 4.25 X10E6/UL (ref 3.77–5.28)
SODIUM SERPL-SCNC: 139 MMOL/L (ref 134–144)
T4 FREE SERPL-MCNC: 1.71 NG/DL (ref 0.82–1.77)
TRIGL SERPL-MCNC: 46 MG/DL (ref 0–149)
TSH SERPL DL<=0.005 MIU/L-ACNC: 0.34 UIU/ML (ref 0.45–4.5)
VLDLC SERPL CALC-MCNC: 9 MG/DL (ref 5–40)
WBC # BLD AUTO: 3.6 X10E3/UL (ref 3.4–10.8)

## 2018-01-31 NOTE — PROGRESS NOTES
HISTORY OF PRESENT ILLNESS  Tristian Sanders is a 59 y.o. female who comes in for consultation by Nola Tian MD for gallbladder polyps and nausea and vomiting  HPI  She has had intermittent bouts of nausea/vomiting for several years. Most recently she was worked up in May 2017. She had an US demonstrated \"tiny polyps\" in the gallbladder. She also states she had an EGD which was ok but does not recall the physician. She states that the events usually occur after fatty food but can last several days where she cannot eat. She had an UGI in 2015 which was normal.   She reports occasional right abdominal twinges. She also reports some fatigue and looser stools but denies constipation, heartburn, melena, hematochezia, dysuria, hematuria, weight loss, vaginal bleeding. She denies significant alcohol use and never smoked. She denies family hx GI malignancy. Past Medical History:   Diagnosis Date    Anxiety     Anxiety     Endocrine disease     hypothyroid    Gallbladder polyp     Thyroid disease      Past Surgical History:   Procedure Laterality Date    HX OVARIAN CYST REMOVAL      HX OVARIAN CYST REMOVAL Right      Family History   Problem Relation Age of Onset    Hypertension Mother     No Known Problems Father     No Known Problems Sister     No Known Problems Brother     No Known Problems Maternal Aunt     No Known Problems Maternal Uncle     No Known Problems Paternal Aunt     No Known Problems Paternal Uncle     Diabetes Maternal Grandmother     Cancer Maternal Grandfather     Cancer Paternal Grandmother     No Known Problems Paternal Grandfather      Social History   Substance Use Topics    Smoking status: Never Smoker    Smokeless tobacco: Never Used    Alcohol use Yes      Comment: rarealy     Current Outpatient Prescriptions   Medication Sig    MULTIVITAMIN PO Take  by mouth.  SYNTHROID 75 mcg tablet Take 75 mcg by mouth Daily (before breakfast).     venlafaxine-SR (EFFEXOR-XR) 75 mg capsule Take 75 mg by mouth daily. No current facility-administered medications for this visit. Allergies   Allergen Reactions    Morphine Nausea and Vomiting       Review of Systems   Constitutional: Positive for malaise/fatigue. Negative for chills, diaphoresis, fever and weight loss. HENT: Negative for congestion, ear pain and sore throat. Eyes: Negative for blurred vision and pain. Respiratory: Negative for cough, hemoptysis, sputum production, shortness of breath, wheezing and stridor. Cardiovascular: Negative for chest pain, palpitations, orthopnea, claudication, leg swelling and PND. Gastrointestinal: Positive for diarrhea, nausea and vomiting. Negative for blood in stool, constipation, heartburn and melena. Genitourinary: Negative for dysuria, flank pain, frequency, hematuria and urgency. Musculoskeletal: Negative for back pain, joint pain, myalgias and neck pain. Skin: Negative for itching and rash. Neurological: Negative for dizziness, tremors, focal weakness, seizures, weakness and headaches. Endo/Heme/Allergies: Negative for polydipsia. Psychiatric/Behavioral: Negative for depression and memory loss. The patient is not nervous/anxious. Visit Vitals    /73 (BP 1 Location: Right arm, BP Patient Position: Sitting)    Pulse 69    Temp 98.8 °F (37.1 °C) (Oral)    Resp 12    Ht 5' 2\" (1.575 m)    Wt 49 kg (108 lb)    SpO2 100%    BMI 19.75 kg/m2     Physical Exam   Constitutional: She is oriented to person, place, and time. She appears well-developed and well-nourished. No distress. HENT:   Head: Normocephalic and atraumatic. Mouth/Throat: Oropharynx is clear and moist. No oropharyngeal exudate. Eyes: Conjunctivae and EOM are normal. Pupils are equal, round, and reactive to light. No scleral icterus. Neck: Normal range of motion. Neck supple. No JVD present. No tracheal deviation present. No thyromegaly present.    Cardiovascular: Normal rate and regular rhythm. Exam reveals no gallop and no friction rub. No murmur heard. Pulmonary/Chest: Effort normal and breath sounds normal. No respiratory distress. She has no wheezes. She has no rales. Abdominal: Soft. Bowel sounds are normal. She exhibits no distension and no mass. There is no hepatosplenomegaly. There is no tenderness. There is no rebound, no guarding, no CVA tenderness and negative Zhou's sign. No hernia. Hernia confirmed negative in the ventral area. Musculoskeletal: Normal range of motion. She exhibits no edema. Lymphadenopathy:     She has no cervical adenopathy. Neurological: She is alert and oriented to person, place, and time. No cranial nerve deficit. Skin: Skin is warm and dry. No rash noted. She is not diaphoretic. No erythema. No pallor. Psychiatric: She has a normal mood and affect. Her behavior is normal. Judgment and thought content normal.     I reviewed her US images today    ASSESSMENT and PLAN  1. Recurrent nausea and vomiting without significant abdominal pain. I explained the differential for these symptoms. Need to get records from GI and findings from their work up. 2.  Gallbladder polyps. These could be stones adherent to the wall. Occasionally gallbladder disease only causes nausea/vomiting but I am concerned that this may not be the source. Her polyps are tiny and are minimal risk for malignancy. I explained the anatomy and pathophysiology of biliary tract disease and cholecystitis, pancreatitis, cholangitis, choledocholithiasis. I explained about laparoscopic possible open cholecystectomy with possible cholangiogram and the risks of surgery including but not limited to bleeding, infection, bile duct or bowel injury, hernia development, retained common duct stones requiring further therapy, non resolution of symptoms, post cholecystectomy diarrhea, DVT, and risks of general anesthesia. 3.  Hypothyroidism.   Recent labs suggest she may need a lower dose    Will get a HIDA with CCK   Obtain prior GI work up    Brice Steele MD FACS

## 2018-01-31 NOTE — PROGRESS NOTES
Angie Domingo is a 59 y.o. female      Chief Complaint   Patient presents with    Gallbladder Attack     Pt seen at the request by Dr. Franklin Ruffin to eval Gallbladder. 1. Have you been to the ER, urgent care clinic since your last visit? Hospitalized since your last visit? No    2. Have you seen or consulted any other health care providers outside of the [address] [place] [address] since your last visit? Include any pap smears or colon screening.  No

## 2018-01-31 NOTE — MR AVS SNAPSHOT
[address], [address] 
[phone] Patient: Marisol Irizarry MRN: [id number] APS:[birth year] Visit Information Date & Time Provider Department Dept. Phone Encounter #  
 1/31/2018  3:00 PM Parker Chauhan MD [facility] [id number] Upcoming Health Maintenance Date Due Hepatitis C Screening 1960 PAP AKA CERVICAL CYTOLOGY 4/6/1979 FOBT Q 1 YEAR AGE 50-75 4/6/2008 DTaP/Tdap/Td series (1 - Tdap) 5/21/2011 BREAST CANCER SCRN MAMMOGRAM 8/11/2011 Allergies as of 1/31/2018  Review Complete On: 1/31/2018 By: Parker Chauhan MD  
  
 Severity Noted Reaction Type Reactions Morphine  11/06/2015    Nausea and Vomiting Current Immunizations  Never Reviewed Name Date  
 TD Vaccine 5/20/2011  7:12 PM  
  
 Not reviewed this visit You Were Diagnosed With   
  
 Codes Comments Gallbladder polyp    -  Primary ICD-10-CM: K82.4 ICD-9-CM: 575.6 Non-intractable vomiting with nausea, unspecified vomiting type     ICD-10-CM: R11.2 ICD-9-CM: 787.01 Vitals BP Pulse Temp Resp Height(growth percentile) Weight(growth percentile) 110/73 (BP 1 Location: Right arm, BP Patient Position: Sitting) 69 98.8 °F (37.1 °C) (Oral) 12 5' 2\" (1.575 m) 108 lb (49 kg) SpO2 BMI OB Status Smoking Status 100% 19.75 kg/m2 Postmenopausal Never Smoker Vitals History BMI and BSA Data Body Mass Index Body Surface Area 19.75 kg/m 2 1.46 m 2 Preferred Pharmacy Pharmacy Name Phone [id number] [address] [phone] Your Updated Medication List  
  
   
This list is accurate as of: 1/31/18  4:18 PM.  Always use your most recent med list.  
  
  
  
  
 MULTIVITAMIN PO Take  by mouth. SYNTHROID 75 mcg tablet Generic drug:  levothyroxine Take 75 mcg by mouth Daily (before breakfast). venlafaxine-SR 75 mg capsule Commonly known as:  EFFEXOR-XR Take 75 mg by mouth daily. To-Do List   
 01/31/2018 Imaging:  NM HEPATOBILIARY W INTERVENTION   
  
 02/05/2018 1:00 PM  
  Appointment with [address] at [facility] ([phone]) Please bring any recent X-rays with you to the procedure. You must bring a LIST or BAG of all current medication you are taking with you to your appointment. NOTHING TO EAT OR DRINK 4 HOURS PRIOR TO ARRIVAL AND PLEASE BRING SOMEONE TO DRIVE THE PATIENT HOME AFTERWARDS. PATIENT SHOULD EAT A FATTY MEAL THE NIGHT BEFORE  THE PATIENT SHOULD NOT TAKE PAIN MEDICATION 4-6 HOURS PRIOR TO ARRIVAL TIME FOR TEST. Introducing [facility] [place] [facility]! [employer] introduces [employer] patient portal. Now you can access parts of your medical record, email your doctor's office, and request medication refills online. 1. In your internet browser, go to [email or web address] 2. Click on the First Time User? Click Here link in the Sign In box. You will see the New Member Sign Up page. 3. Enter your [employer] Access Code exactly as it appears below. You will not need to use this code after youve completed the sign-up process. If you do not sign up before the expiration date, you must request a new code. · [employer] Access Code: [email or web address] Expires: 4/8/2018  8:53 AM 
 
4. Enter the last four digits of your Social Security Number (xxxx) and Date of Birth (mm/dd/yyyy) as indicated and click Submit. You will be taken to the next sign-up page. 5. Create a [employer] ID. This will be your [employer] login ID and cannot be changed, so think of one that is secure and easy to remember. 6. Create a [employer] password. You can change your password at any time. 7. Enter your Password Reset Question and Answer.  This can be used at a later time if you forget your password. 8. Enter your e-mail address. You will receive e-mail notification when new information is available in [address] 9. Click Sign Up. You can now view and download portions of your medical record. 10. Click the Download Summary menu link to download a portable copy of your medical information. If you have questions, please visit the Frequently Asked Questions section of the [employer] website. Remember, [employer] is NOT to be used for urgent needs. For medical emergencies, dial 911. Now available from your iPhone and Android! Please provide this summary of care documentation to your next provider. Your primary care clinician is listed as ATIYA CHAPA [address] [place], [place] [address]. If you have any questions after today's visit, please call [phone].

## 2018-02-01 RX ORDER — LEVOTHYROXINE SODIUM 50 UG/1
50 TABLET ORAL
Qty: 30 TAB | Refills: 3 | Status: SHIPPED | OUTPATIENT
Start: 2018-02-01 | End: 2018-04-09 | Stop reason: SDUPTHER

## 2018-02-01 NOTE — PROGRESS NOTES
Please advise Ms Gabo Tian that thyroid is over supplemented - advised to decrease the dose to levothyroxine 50 mcg and repeat level in 8 weeks - lab already ordered no need for appointment    Triglycerides are normal 46 which is excellent.  LDL bad cholesterol is mildly elevated - recommend dietary changes - low fat diet and increase fiber in diet ( oatmeal and fruits and vegetables)     Blood count is normal    Kidney and liver function are normal

## 2018-02-05 ENCOUNTER — HOSPITAL ENCOUNTER (OUTPATIENT)
Dept: NUCLEAR MEDICINE | Age: 58
Discharge: HOME | End: 2018-02-05
Attending: SURGERY
Payer: COMMERCIAL

## 2018-02-05 VITALS — BODY MASS INDEX: 19.2 KG/M2 | WEIGHT: 105 LBS

## 2018-02-05 DIAGNOSIS — R11.2 NON-INTRACTABLE VOMITING WITH NAUSEA, UNSPECIFIED VOMITING TYPE: ICD-10-CM

## 2018-02-05 DIAGNOSIS — K82.4 GALLBLADDER POLYP: ICD-10-CM

## 2018-02-05 PROCEDURE — 74011250636 HC RX REV CODE- 250/636

## 2018-02-05 PROCEDURE — 78227 HEPATOBIL SYST IMAGE W/DRUG: CPT

## 2018-02-05 RX ADMIN — SINCALIDE 0.95 MCG: 5 INJECTION, POWDER, LYOPHILIZED, FOR SOLUTION INTRAVENOUS at 14:10

## 2018-02-06 ENCOUNTER — TELEPHONE (OUTPATIENT)
Dept: SURGERY | Age: 58
End: 2018-02-06

## 2018-02-07 NOTE — PROGRESS NOTES
Reviewed results with patient per Dr. Yamileth Flaherty. I have reviewed the provider's instructions with the patient, answering all questions to her satisfaction. Also [address], office to ask about results. They will have Raphael Mendes, get in touch with patient about results.

## 2018-02-07 NOTE — TELEPHONE ENCOUNTER
ARYA 2/5/2018  Subsequently gallbladder ejection fraction is calculated during 30 minutes of  additional imaging during IV infusion of  0.95 micrograms CCK. Calculated  gallbladder ejection fraction is 57%. (Normal > 30%). IMPRESSION:  1. Normal Gallbladder Filling. No Biliary Obstruction.   2. Normal Gallbladder EF    No symptoms with the test    Polyps are small    Favor observation of the gallbladder at this time    She will need f/u with GI prior to any surgery  She does not recall who she saw in the past.   Will defer to Bard Jeff MD to arrange      Olga Reynolds MD FACS

## 2018-03-03 ENCOUNTER — OFFICE VISIT (OUTPATIENT)
Dept: PRIMARY CARE CLINIC | Age: 58
End: 2018-03-03

## 2018-03-03 VITALS
TEMPERATURE: 98 F | WEIGHT: 106.3 LBS | OXYGEN SATURATION: 100 % | HEIGHT: 62 IN | SYSTOLIC BLOOD PRESSURE: 110 MMHG | BODY MASS INDEX: 19.56 KG/M2 | HEART RATE: 64 BPM | DIASTOLIC BLOOD PRESSURE: 74 MMHG | RESPIRATION RATE: 17 BRPM

## 2018-03-03 DIAGNOSIS — J06.9 ACUTE URI: Primary | ICD-10-CM

## 2018-03-03 NOTE — PROGRESS NOTES
Chief Complaint   Patient presents with    Sore Throat    Nasal Congestion   pt c/o sore throat and nasal congestion with green phlegm production since Thursday night, she states she has tried otc decongestant to help with discomfort,she denies any nausea,vomiting or fever. This note will not be viewable in [address]

## 2018-03-03 NOTE — PROGRESS NOTES
Subjective:   Jose Kam is a 59 y.o. female who complains of sore throat, laryngitis, cough (occas prod with green mucous), and chest congestion for 1.5 days, stable since that time. Denies any fevers or chills. Denies any headache or nausea. Taking otc mucous relief, dayquil, and nyquil. She denies a history of shortness of breath and wheezing. Evaluation to date: none. Treatment to date: OTC products. Patient does not smoke cigarettes. Relevant PMH:   Past Medical History:   Diagnosis Date    Anxiety     Anxiety     Endocrine disease     hypothyroid    Gallbladder polyp     Thyroid disease      Past Surgical History:   Procedure Laterality Date    HX OVARIAN CYST REMOVAL      HX OVARIAN CYST REMOVAL Right      Allergies   Allergen Reactions    Morphine Nausea and Vomiting         Review of Systems  Pertinent items are noted in HPI. Objective:     Visit Vitals    /74 (BP 1 Location: Left arm, BP Patient Position: Sitting)    Pulse 64    Temp 98 °F (36.7 °C) (Oral)    Resp 17    Ht 5' 2\" (1.575 m)    Wt 106 lb 4.8 oz (48.2 kg)    SpO2 100%    BMI 19.44 kg/m2     General:  alert, cooperative, no distress   Eyes: negative   Ears: normal TM's and external ear canals AU   Sinuses: Normal paranasal sinuses without tenderness   Mouth:  Lips, mucosa, and tongue normal. Teeth and gums normal and normal findings: oropharynx pink & moist without lesions or evidence of thrush   Neck: supple, symmetrical, trachea midline and no adenopathy. Heart: S1 and S2 normal, no murmurs noted. Lungs: clear to auscultation bilaterally        Assessment/Plan:       ICD-10-CM ICD-9-CM    1. Acute URI J06.9 465.9      Discussed dx and tx of URIs  Suggested symptomatic OTC remedies. RTC prn. Discussed diagnosis and treatment of viral URIs. Dutch Trejo NP  This note will not be viewable in [address]

## 2018-03-23 RX ORDER — VENLAFAXINE HYDROCHLORIDE 75 MG/1
CAPSULE, EXTENDED RELEASE ORAL
Qty: 90 CAP | Refills: 3 | Status: SHIPPED | OUTPATIENT
Start: 2018-03-23 | End: 2018-11-20

## 2018-05-18 DIAGNOSIS — E03.9 ACQUIRED HYPOTHYROIDISM: ICD-10-CM

## 2018-05-19 RX ORDER — LEVOTHYROXINE SODIUM 50 UG/1
TABLET ORAL
Qty: 90 TAB | Refills: 1 | Status: SHIPPED | OUTPATIENT
Start: 2018-05-19 | End: 2018-08-29 | Stop reason: SDUPTHER

## 2018-08-27 DIAGNOSIS — E03.9 ACQUIRED HYPOTHYROIDISM: ICD-10-CM

## 2018-08-29 RX ORDER — LEVOTHYROXINE SODIUM 50 UG/1
TABLET ORAL
Qty: 90 TAB | Refills: 1 | Status: SHIPPED | COMMUNITY
Start: 2018-08-29 | End: 2019-05-20 | Stop reason: SDUPTHER

## 2018-10-15 ENCOUNTER — OFFICE VISIT (OUTPATIENT)
Dept: INTERNAL MEDICINE CLINIC | Age: 58
End: 2018-10-15

## 2018-10-15 VITALS
TEMPERATURE: 98 F | WEIGHT: 108 LBS | BODY MASS INDEX: 19.88 KG/M2 | HEART RATE: 59 BPM | RESPIRATION RATE: 16 BRPM | DIASTOLIC BLOOD PRESSURE: 55 MMHG | SYSTOLIC BLOOD PRESSURE: 92 MMHG | OXYGEN SATURATION: 100 % | HEIGHT: 62 IN

## 2018-10-15 DIAGNOSIS — Z20.828 EXPOSURE TO THE FLU: ICD-10-CM

## 2018-10-15 DIAGNOSIS — R53.82 CHRONIC FATIGUE: Primary | ICD-10-CM

## 2018-10-15 RX ORDER — OSELTAMIVIR PHOSPHATE 75 MG/1
75 CAPSULE ORAL DAILY
Qty: 10 CAP | Refills: 0 | Status: SHIPPED | OUTPATIENT
Start: 2018-10-15 | End: 2018-10-23 | Stop reason: ALTCHOICE

## 2018-10-15 NOTE — MR AVS SNAPSHOT
[address] 
[phone] Patient: Gerald Khan MRN: [id number] ILD:0[birth year] Visit Information Date & Time Provider Department Dept. Phone Encounter #  
 10/15/2018 11:30 AM Garland Esteban, [address] [id number] Follow-up Instructions Return if symptoms worsen or fail to improve. Upcoming Health Maintenance Date Due Hepatitis C Screening 1960 PAP AKA CERVICAL CYTOLOGY 4/6/1979 Shingrix Vaccine Age 50> (1 of 2) 4/6/2008 FOBT Q 1 YEAR AGE 50-75 4/6/2008 DTaP/Tdap/Td series (1 - Tdap) 5/21/2011 BREAST CANCER SCRN MAMMOGRAM 8/11/2011 Influenza Age 5 to Adult 8/1/2018 Allergies as of 10/15/2018  Review Complete On: 10/15/2018 By: Garland Esteban MD  
  
 Severity Noted Reaction Type Reactions Morphine  11/06/2015    Nausea and Vomiting Current Immunizations  Never Reviewed Name Date  
 TD Vaccine 5/20/2011  7:12 PM  
  
 Not reviewed this visit You Were Diagnosed With   
  
 Codes Comments Chronic fatigue    -  Primary ICD-10-CM: R53.82 
ICD-9-CM: 780.79 Exposure to the flu     ICD-10-CM: Z20.828 ICD-9-CM: V01.79 Vitals BP Pulse Temp Resp Height(growth percentile) Weight(growth percentile) 92/55 (BP 1 Location: Left arm, BP Patient Position: Sitting) (!) 59 98 °F (36.7 °C) (Oral) 16 5' 2\" (1.575 m) 108 lb (49 kg) SpO2 BMI OB Status Smoking Status 100% 19.75 kg/m2 Postmenopausal Never Smoker Vitals History BMI and BSA Data Body Mass Index Body Surface Area 19.75 kg/m 2 1.46 m 2 Preferred Pharmacy Pharmacy Name Phone [address] [place] [address], [address] [place] [address] Vanda Roldan Said [phone] Your Updated Medication List  
  
   
This list is accurate as of 10/15/18 12:42 PM.  Always use your most recent med list.  
  
  
  
  
 MULTIVITAMIN PO Take  by mouth. oseltamivir 75 mg capsule Commonly known as:  TAMIFLU Take 1 Cap by mouth daily for 7 days. synthroid 50 mcg tablet Generic drug:  levothyroxine TAKE 1 TABLET DAILY BEFORE BREAKFAST  
  
 venlafaxine-SR 75 mg capsule Commonly known as:  EFFEXOR-XR  
TAKE ONE CAPSULE BY MOUTH EVERY DAY Prescriptions Sent to Pharmacy Refills  
 oseltamivir (TAMIFLU) 75 mg capsule 0 Sig: Take 1 Cap by mouth daily for 7 days. Class: Normal  
 Pharmacy: Cl[facility] Bowles at [address] #: [phone] Route: Oral  
  
We Performed the Following CBC WITH AUTOMATED DIFF [38560 CPT(R)] METABOLIC PANEL, COMPREHENSIVE [46386 CPT(R)] T4, FREE [id number] CPT(R)] TSH 3RD GENERATION [07492 CPT(R)] VITAMIN D, 25 HYDROXY [id number] CPT(R)] Follow-up Instructions Return if symptoms worsen or fail to improve. Introducing [facility]! [facility] introduces [facility] patient portal. Now you can access parts of your medical record, email your doctor's office, and request medication refills online. 1. In your internet browser, go to [email or web address] "[email or web address] 2. Click on the First Time User? Click Here link in the Sign In box. You will see the New Member Sign Up page. 3. Enter your [facility] Access Code exactly as it appears below. You will not need to use this code after youve completed the sign-up process. If you do not sign up before the expiration date, you must request a new code. · [facility] Access Code: [email or web address] Expires: 1/13/2019 12:42 PM 
 
4. Enter the last four digits of your Social Security Number (xxxx) and Date of Birth (mm/dd/yyyy) as indicated and click Submit. You will be taken to the next sign-up page. 5. Create a [facility] ID. This will be your [facility] login ID and cannot be changed, so think of one that is secure and easy to remember. 6. Create a [employer] password. You can change your password at any time. 7. Enter your Password Reset Question and Answer. This can be used at a later time if you forget your password. 8. Enter your e-mail address. You will receive e-mail notification when new information is available in [address] 9. Click Sign Up. You can now view and download portions of your medical record. 10. Click the Download Summary menu link to download a portable copy of your medical information. If you have questions, please visit the Frequently Asked Questions section of the [employer] website. Remember, [employer] is NOT to be used for urgent needs. For medical emergencies, dial 911. Now available from your iPhone and Android! Please provide this summary of care documentation to your next provider. Your primary care clinician is listed as ATIYA CHAPA [address] [place] [address] Ave. If you have any questions after today's visit, please call [phone].

## 2018-10-15 NOTE — PROGRESS NOTES
SUBJECTIVE:   Ms. Zuleima Up is a 59 y.o. female who is here c/o intermittent fatigue, lethargy, headaches, feeling \"in a fog\" x 2-3mos. Pt reports intermittent sinus congestion and feels like she bruises easily. Pt is using juice plus, but is not taking a multivitamin or probiotics. Pt reports prior to juice plus she had episodes of vomiting once a month and nausea, but pt has not experienced sx's since. Pt reports gallbladder biopsy, BA swallow were normal. Pt takes effexor every other day; she is trying to wean herself. Pt is compliant with her synthroid. Pt c/o lower back aches, a low-grade fever, chills, soft bowel movements, and vomiting x 10/14/18. Pt notes her  had all the sx's of the flu last week (no dx) and she was traveling. Pt denies problems urinating. Pt reports she pulled a muscle in her neck yesterday with pain radiated through her head. She saw her chiropractor this AM.    Pt's PCP is Dr. Ngoc Keith. At this time, she is otherwise doing well and has brought no other complaints to my attention today. PMH:   Past Medical History:   Diagnosis Date    Anxiety     Anxiety     Endocrine disease     hypothyroid    Gallbladder polyp     Thyroid disease      PSH:  has a past surgical history that includes hx ovarian cyst removal and hx ovarian cyst removal (Right). All: is allergic to morphine. MEDS:   Current Outpatient Prescriptions   Medication Sig    oseltamivir (TAMIFLU) 75 mg capsule Take 1 Cap by mouth daily for 7 days.  SYNTHROID 50 mcg tablet TAKE 1 TABLET DAILY BEFORE BREAKFAST    venlafaxine-SR (EFFEXOR-XR) 75 mg capsule TAKE ONE CAPSULE BY MOUTH EVERY DAY    MULTIVITAMIN PO Take  by mouth. No current facility-administered medications for this visit.         FH: family history includes Cancer in her maternal grandfather and paternal grandmother; Diabetes in her maternal grandmother; Hypertension in her mother; No Known Problems in her brother, father, maternal aunt, maternal uncle, paternal aunt, paternal grandfather, paternal uncle, and sister. SH:  reports that she has never smoked. She has never used smokeless tobacco. She reports that she drinks alcohol. She reports that she does not use illicit drugs. Review of Systems - History obtained from the patient  General ROS: fatigue, lethargy, chills  Psychological ROS: negative  Ophthalmic ROS: negative  ENT ROS: negative  Respiratory ROS: no cough, shortness of breath, or wheezing  Cardiovascular ROS: no chest pain or dyspnea on exertion  Gastrointestinal ROS: no abdominal pain, change in bowel habits, or black or bloody stools  Genito-Urinary ROS: negative  Musculoskeletal ROS: lower back aches  Neurological ROS: headaches  Dermatological ROS: negative    OBJECTIVE:   Vitals:   Visit Vitals    BP 92/55 (BP 1 Location: Left arm, BP Patient Position: Sitting)    Pulse (!) 59    Temp 98 °F (36.7 °C) (Oral)    Resp 16    Ht 5' 2\" (1.575 m)    Wt 108 lb (49 kg)    SpO2 100%    BMI 19.75 kg/m2      Gen: Pleasant 59 y.o.  female in NAD. HEENT: + R turbinate swollen NC/AT. HEART: RRR, No M/G/R.   LUNGS: CTAB No W/R. EXTREMITIES: Warm. No C/C/E.   NEURO: Alert and oriented x 3. Cranial nerves grossly intact. No focal sensory or motor deficits noted. SKIN: Warm. Dry. No rashes or other lesions noted. ASSESSMENT/ PLAN:     Diagnoses and all orders for this visit:    1. Chronic fatigue  -     CBC WITH AUTOMATED DIFF  -     METABOLIC PANEL, COMPREHENSIVE  -     T4, FREE  -     TSH 3RD GENERATION  -     VITAMIN D, 25 HYDROXY    2. Exposure to the flu  -     oseltamivir (TAMIFLU) 75 mg capsule; Take 1 Cap by mouth daily for 7 days. 1. Chronic fatigue  I ordered labs for CBC, CMP, T4, TSH, and vit D to assess for causes of pt's fatigue. 2. Exposure to the flu. Pt's rapid flu was negative. I prescribed pt tamiflu for prophylaxis.     Follow-up Disposition:  Return if symptoms worsen or fail to improve. I have reviewed the patient's medications and risks/side effects/benefits were discussed. Diagnosis(-es) explained to patient and questions answered. Literature provided where appropriate.      Written by Jose De Jesus Edwards, as dictated by Jose Choi MD.

## 2018-10-16 LAB
25(OH)D3+25(OH)D2 SERPL-MCNC: 45.5 NG/ML (ref 30–100)
ALBUMIN SERPL-MCNC: 4.8 G/DL (ref 3.6–4.8)
ALBUMIN/GLOB SERPL: 1.7 {RATIO} (ref 1.2–2.2)
ALP SERPL-CCNC: 87 IU/L (ref 39–117)
ALT SERPL-CCNC: 14 IU/L (ref 0–32)
AST SERPL-CCNC: 20 IU/L (ref 0–40)
BASOPHILS # BLD AUTO: 0 X10E3/UL (ref 0–0.2)
BASOPHILS NFR BLD AUTO: 1 %
BILIRUB SERPL-MCNC: 0.4 MG/DL (ref 0–1.2)
BUN SERPL-MCNC: 10 MG/DL (ref 8–27)
BUN/CREAT SERPL: 14 (ref 12–28)
CALCIUM SERPL-MCNC: 10 MG/DL (ref 8.7–10.3)
CHLORIDE SERPL-SCNC: 105 MMOL/L (ref 96–106)
CO2 SERPL-SCNC: 20 MMOL/L (ref 20–29)
CREAT SERPL-MCNC: 0.73 MG/DL (ref 0.57–1)
EOSINOPHIL # BLD AUTO: 0.2 X10E3/UL (ref 0–0.4)
EOSINOPHIL NFR BLD AUTO: 4 %
ERYTHROCYTE [DISTWIDTH] IN BLOOD BY AUTOMATED COUNT: 14 % (ref 12.3–15.4)
GLOBULIN SER CALC-MCNC: 2.8 G/DL (ref 1.5–4.5)
GLUCOSE SERPL-MCNC: 83 MG/DL (ref 65–99)
HCT VFR BLD AUTO: 42.2 % (ref 34–46.6)
HGB BLD-MCNC: 14 G/DL (ref 11.1–15.9)
IMM GRANULOCYTES # BLD: 0 X10E3/UL (ref 0–0.1)
IMM GRANULOCYTES NFR BLD: 0 %
LYMPHOCYTES # BLD AUTO: 1.5 X10E3/UL (ref 0.7–3.1)
LYMPHOCYTES NFR BLD AUTO: 31 %
MCH RBC QN AUTO: 29.8 PG (ref 26.6–33)
MCHC RBC AUTO-ENTMCNC: 33.2 G/DL (ref 31.5–35.7)
MCV RBC AUTO: 90 FL (ref 79–97)
MONOCYTES # BLD AUTO: 0.2 X10E3/UL (ref 0.1–0.9)
MONOCYTES NFR BLD AUTO: 5 %
NEUTROPHILS # BLD AUTO: 2.9 X10E3/UL (ref 1.4–7)
NEUTROPHILS NFR BLD AUTO: 59 %
PLATELET # BLD AUTO: 309 X10E3/UL (ref 150–379)
POTASSIUM SERPL-SCNC: 3.8 MMOL/L (ref 3.5–5.2)
PROT SERPL-MCNC: 7.6 G/DL (ref 6–8.5)
RBC # BLD AUTO: 4.7 X10E6/UL (ref 3.77–5.28)
SODIUM SERPL-SCNC: 147 MMOL/L (ref 134–144)
T4 FREE SERPL-MCNC: 1.39 NG/DL (ref 0.82–1.77)
TSH SERPL DL<=0.005 MIU/L-ACNC: 5.11 UIU/ML (ref 0.45–4.5)
WBC # BLD AUTO: 4.9 X10E3/UL (ref 3.4–10.8)

## 2018-10-17 ENCOUNTER — HOSPITAL ENCOUNTER (EMERGENCY)
Age: 58
Discharge: HOME | End: 2018-10-17
Attending: EMERGENCY MEDICINE
Payer: COMMERCIAL

## 2018-10-17 ENCOUNTER — TELEPHONE (OUTPATIENT)
Dept: INTERNAL MEDICINE CLINIC | Age: 58
End: 2018-10-17

## 2018-10-17 VITALS
DIASTOLIC BLOOD PRESSURE: 71 MMHG | HEIGHT: 62 IN | TEMPERATURE: 97.8 F | RESPIRATION RATE: 16 BRPM | WEIGHT: 112.21 LBS | OXYGEN SATURATION: 100 % | SYSTOLIC BLOOD PRESSURE: 138 MMHG | HEART RATE: 60 BPM | BODY MASS INDEX: 20.65 KG/M2

## 2018-10-17 DIAGNOSIS — R79.89 ELEVATED TSH: ICD-10-CM

## 2018-10-17 DIAGNOSIS — R07.89 MUSCULOSKELETAL CHEST PAIN: ICD-10-CM

## 2018-10-17 DIAGNOSIS — M43.6 ACUTE TORTICOLLIS: Primary | ICD-10-CM

## 2018-10-17 LAB — TROPONIN I BLD-MCNC: <0.04 NG/ML (ref 0–0.08)

## 2018-10-17 PROCEDURE — 84484 ASSAY OF TROPONIN QUANT: CPT

## 2018-10-17 PROCEDURE — 93005 ELECTROCARDIOGRAM TRACING: CPT

## 2018-10-17 PROCEDURE — 74011250637 HC RX REV CODE- 250/637: Performed by: PHYSICIAN ASSISTANT

## 2018-10-17 PROCEDURE — 99284 EMERGENCY DEPT VISIT MOD MDM: CPT

## 2018-10-17 RX ORDER — ONDANSETRON 4 MG/1
4 TABLET, ORALLY DISINTEGRATING ORAL
Status: COMPLETED | OUTPATIENT
Start: 2018-10-17 | End: 2018-10-17

## 2018-10-17 RX ORDER — CYCLOBENZAPRINE HCL 10 MG
10 TABLET ORAL
Qty: 20 TAB | Refills: 0 | Status: SHIPPED | OUTPATIENT
Start: 2018-10-17 | End: 2018-10-23

## 2018-10-17 RX ORDER — CYCLOBENZAPRINE HCL 10 MG
10 TABLET ORAL
Status: COMPLETED | OUTPATIENT
Start: 2018-10-17 | End: 2018-10-17

## 2018-10-17 RX ORDER — NAPROXEN 250 MG/1
500 TABLET ORAL
Status: COMPLETED | OUTPATIENT
Start: 2018-10-17 | End: 2018-10-17

## 2018-10-17 RX ORDER — NAPROXEN 500 MG/1
500 TABLET ORAL 2 TIMES DAILY WITH MEALS
Qty: 60 TAB | Refills: 0 | Status: SHIPPED | OUTPATIENT
Start: 2018-10-17 | End: 2018-11-16

## 2018-10-17 RX ADMIN — NAPROXEN 500 MG: 250 TABLET ORAL at 20:01

## 2018-10-17 RX ADMIN — ONDANSETRON 4 MG: 4 TABLET, ORALLY DISINTEGRATING ORAL at 20:01

## 2018-10-17 RX ADMIN — CYCLOBENZAPRINE HYDROCHLORIDE 10 MG: 10 TABLET, FILM COATED ORAL at 20:01

## 2018-10-17 NOTE — ED PROVIDER NOTES
EMERGENCY DEPARTMENT HISTORY AND PHYSICAL EXAM          Date: 10/17/2018  Patient Name: Lavelle Persaud    History of Presenting Illness     Chief Complaint   Patient presents with    Back Pain     reports twisting and injuring upper back on Sunday, radiating into neck and causing headache; saw chiropractor Monday and today; reports pain worse today    Neck Pain    Headache       History Provided By: Patient    HPI: Lavelle Persaud is a 59 y.o. female, pmhx thyroid disease, who presents via EMS to the ED c/o bilateral neck and shoulder spasms that radiate to her middle back and anterior chest wall x 3 days. Pt recently went on a 9 mile hike and did not stretch prior and attributes the pain to that. Pt feels the twisting motion she performed made her \"pull her muscles. \" She went to see her chiropractor on Monday and today, but is not feeling better. She called EMS because she felt momentarily dizzy and developed nausea. She has been tired a lot recently, but her PCP did blood work showing her TSH level elevated. She has a headache from the muscle spasms in her neck. She specifically denies any recent fevers, chills, , vomiting, diarrhea, abd pain, urinary sxs, changes in BM. She denies history of diabetes, kidney disease, liver disease    PCP: Kiana Jay MD    There are no other complaints, changes, or physical findings at this time. Current Outpatient Medications   Medication Sig Dispense Refill    oseltamivir (TAMIFLU) 75 mg capsule Take 1 Cap by mouth daily for 7 days. 10 Cap 0    SYNTHROID 50 mcg tablet TAKE 1 TABLET DAILY BEFORE BREAKFAST 90 Tab 1    venlafaxine-SR (EFFEXOR-XR) 75 mg capsule TAKE ONE CAPSULE BY MOUTH EVERY DAY 90 Cap 3    MULTIVITAMIN PO Take  by mouth.          Past History     Past Medical History:  Past Medical History:   Diagnosis Date    Anxiety     Anxiety     Endocrine disease     hypothyroid    Gallbladder polyp     Thyroid disease        Past Surgical History:  Past Surgical History:   Procedure Laterality Date    HX OVARIAN CYST REMOVAL      HX OVARIAN CYST REMOVAL Right        Family History:  Family History   Problem Relation Age of Onset    Hypertension Mother     No Known Problems Father     No Known Problems Sister     No Known Problems Brother     No Known Problems Maternal Aunt     No Known Problems Maternal Uncle     No Known Problems Paternal Aunt     No Known Problems Paternal Uncle     Diabetes Maternal Grandmother     Cancer Maternal Grandfather     Cancer Paternal Grandmother     No Known Problems Paternal Grandfather        Social History:  Social History     Tobacco Use    Smoking status: Never Smoker    Smokeless tobacco: Never Used   Substance Use Topics    Alcohol use: Yes     Comment: rarealy    Drug use: No       Allergies: Allergies   Allergen Reactions    Morphine Nausea and Vomiting         Review of Systems   Review of Systems   Constitutional: Positive for fatigue. Negative for activity change, appetite change and fever. HENT: Negative for congestion, dental problem, ear pain, rhinorrhea and sinus pressure. Eyes: Negative for photophobia, pain, discharge, redness, itching and visual disturbance. Respiratory: Negative for cough, chest tightness, shortness of breath, wheezing and stridor. Cardiovascular: Negative for chest pain and leg swelling. Gastrointestinal: Positive for nausea. Negative for abdominal distention, abdominal pain and blood in stool. Genitourinary: Negative for difficulty urinating, dysuria, flank pain, frequency, vaginal bleeding, vaginal discharge and vaginal pain. Musculoskeletal: Positive for back pain, myalgias and neck pain. Negative for arthralgias, gait problem, joint swelling and neck stiffness. Skin: Negative for rash and wound. Neurological: Positive for dizziness.  Negative for tremors, seizures, syncope, speech difficulty, weakness, light-headedness, numbness and headaches. Psychiatric/Behavioral: Negative for behavioral problems. The patient is not nervous/anxious. Physical Exam   Physical Exam   Constitutional: She is oriented to person, place, and time. Vital signs are normal. She appears well-developed and well-nourished. No distress. Ambulatory. HENT:   Head: Normocephalic and atraumatic. Right Ear: External ear normal.   Left Ear: External ear normal.   Nose: Nose normal.   Eyes: Conjunctivae and EOM are normal. Pupils are equal, round, and reactive to light. Right eye exhibits no discharge. Left eye exhibits no discharge. No scleral icterus. Neck: Neck supple. Muscular tenderness present. No spinous process tenderness present. No neck rigidity. Decreased range of motion present. No tracheal deviation, no edema and no erythema present. Cardiovascular: Normal rate, regular rhythm, normal heart sounds and intact distal pulses. Exam reveals no gallop and no friction rub. No murmur heard. Pulmonary/Chest: Effort normal and breath sounds normal. No respiratory distress. She has no wheezes. She has no rales. She exhibits no tenderness. Musculoskeletal: She exhibits no edema. Right shoulder: She exhibits tenderness, pain and spasm. She exhibits no bony tenderness and no deformity. Left shoulder: She exhibits tenderness, deformity, pain and spasm. She exhibits no bony tenderness. Cervical back: She exhibits tenderness, pain and spasm. She exhibits no bony tenderness and no deformity. Thoracic back: She exhibits tenderness, deformity, pain and spasm. She exhibits no bony tenderness. Spasm and tenderness of bilateral trapezii and rhomboids. NVID bilateral UEs. Equal  strength. Lymphadenopathy:     She has no cervical adenopathy. Neurological: She is alert and oriented to person, place, and time. No cranial nerve deficit. GCS eye subscore is 4. GCS verbal subscore is 5. GCS motor subscore is 6.    CN II- XII grossly intact   Skin: Skin is warm and dry. No rash noted. No erythema. Psychiatric: She has a normal mood and affect. Her behavior is normal.   Nursing note and vitals reviewed. Diagnostic Study Results     Labs -     Recent Results (from the past 12 hour(s))   POC TROPONIN-I    Collection Time: 10/17/18  8:12 PM   Result Value Ref Range    Troponin-I (POC) <0.04 0.00 - 0.08 ng/mL   EKG, 12 LEAD, INITIAL    Collection Time: 10/17/18  8:16 PM   Result Value Ref Range    Ventricular Rate 59 BPM    Atrial Rate 59 BPM    P-R Interval 152 ms    QRS Duration 86 ms    Q-T Interval 430 ms    QTC Calculation (Bezet) 425 ms    Calculated P Axis 74 degrees    Calculated R Axis 64 degrees    Calculated T Axis 49 degrees    Diagnosis       Sinus bradycardia  When compared with ECG of 11-MAR-2015 05:25,  No significant change was found         Radiologic Studies -   No orders to display     CT Results  (Last 48 hours)    None        CXR Results  (Last 48 hours)    None            Medical Decision Making   I am the first provider for this patient. I reviewed the vital signs, available nursing notes, past medical history, past surgical history, family history and social history. Vital Signs-Reviewed the patient's vital signs. Patient Vitals for the past 12 hrs:   Temp Pulse Resp BP SpO2   10/17/18 1849 97.8 °F (36.6 °C) 60 16 138/71 100 %       Records Reviewed: Nursing Notes, Old Medical Records and Previous Laboratory Studies    Provider Notes (Medical Decision Making):     DDx: acute torticollis, musculoskeletal chest pain, cardiac event, tension HA,     ED Course:   Initial assessment performed. The patients presenting problems have been discussed, and they are in agreement with the care plan formulated and outlined with them. I have encouraged them to ask questions as they arise throughout their visit. Discussed case with Dr. Jack Bennett, she agrees to the care plan.          PROGRESS NOTE:  8:50PM  Pt noted to be feeling better , ready for discharge. Updated pt and/or family on all final lab findings. Will follow up as instructed. All questions have been answered, pt voiced understanding and agreement with plan. Muscle relaxers were prescribed, pt was advised not to drive or operate heavy machinery. Specific return precautions provided as well as instructions to return to the ED should sx worsen at any time. Vital signs stable for discharge. KRIS Centeno    Disposition:    DISCHARGE NOTE:  8:52PM  The patient's results have been reviewed with family and/or caregiver. They verbally convey their understanding and agreement of the patient's signs, symptoms, diagnosis, treatment, and prognosis. They additionally agree to follow up as recommended in the discharge instructions or to return to the Emergency Room should the patient's condition change prior to their follow-up appointment. The family and/or caregiver verbally agrees with the care-plan and all of their questions have been answered. The discharge instructions have also been provided to the them along with educational information regarding the patient's diagnosis and a list of reasons why the patient would want to return to the ER prior to their follow-up appointment should their condition change. KRIS Centeno    PLAN:  1. Discharge Medication List as of 10/17/2018  8:45 PM      START taking these medications    Details   cyclobenzaprine (FLEXERIL) 10 mg tablet Take 1 Tab by mouth three (3) times daily (with meals). , Normal, Disp-20 Tab, R-0      naproxen (NAPROSYN) 500 mg tablet Take 1 Tab by mouth two (2) times daily (with meals) for 30 days. , Normal, Disp-60 Tab, R-0         CONTINUE these medications which have NOT CHANGED    Details   oseltamivir (TAMIFLU) 75 mg capsule Take 1 Cap by mouth daily for 7 days. , Normal, Disp-10 Cap, R-0      SYNTHROID 50 mcg tablet TAKE 1 TABLET DAILY BEFORE BREAKFAST, Mail Order, Disp-90 Tab, R-1      venlafaxine-SR (EFFEXOR-XR) 75 mg capsule TAKE ONE CAPSULE BY MOUTH EVERY DAY, Normal, Disp-90 Cap, R-3      MULTIVITAMIN PO Take  by mouth., Historical Med           2. Follow-up Information     Follow up With Specialties Details Why Contact Info    Phyllis Juárez MD Internal Medicine Schedule an appointment as soon as possible for a visit  [address] [facility]  [phone]      [facility] EMERGENCY DEPT Emergency Medicine  If symptoms worsen [address] [facility]  [address] [facility]  [phone]        Return to ED if worse     Diagnosis     Clinical Impression:   1. Acute torticollis    2. Musculoskeletal chest pain    3. Elevated TSH              This note will not be viewable in MyChart.

## 2018-10-17 NOTE — TELEPHONE ENCOUNTER
Patient states she would like a call back today after 4:30 pm as she has an appt from 4-4:30 pm but needs to know results & if any medication changes. Please call.  Thank you

## 2018-10-17 NOTE — PROGRESS NOTES
Lillie,  Has this patient been taking her Synthroid daily for at least 2 months? Her fatigue is related to her abnormal thyroid hormone levels. CMP-Normal electrolyte levels except for a mild elevation in the sodium level, normal renal and liver function  CBC-Normal red and white blood cell levels.   TSH-elevated  T4-normal  VIt d-normal

## 2018-10-17 NOTE — LETTER
[address] 
[facility] EMERGENCY DEPT 
[address] [place] [address] [address] [id number] [phone] Work/School Note Date: 10/17/2018 To Whom It May concern: 
 
Camille Mary was seen and treated today in the emergency room by the following provider(s): 
Attending Provider: Feliz Glover DO Physician Assistant: KANU Johnson. Camille Mary may return to work on 10/20/18 or sooner, if feeling better. Sincerely, Vlad Antoine PA

## 2018-10-17 NOTE — TELEPHONE ENCOUNTER
Pt is requesting a call back in regards to miss call.  password on account         Message received & copied from [facility]

## 2018-10-18 LAB
ATRIAL RATE: 59 BPM
CALCULATED P AXIS, ECG09: 74 DEGREES
CALCULATED R AXIS, ECG10: 64 DEGREES
CALCULATED T AXIS, ECG11: 49 DEGREES
DIAGNOSIS, 93000: NORMAL
P-R INTERVAL, ECG05: 152 MS
Q-T INTERVAL, ECG07: 430 MS
QRS DURATION, ECG06: 86 MS
QTC CALCULATION (BEZET), ECG08: 425 MS
VENTRICULAR RATE, ECG03: 59 BPM

## 2018-10-18 RX ORDER — LEVOTHYROXINE SODIUM 75 UG/1
75 TABLET ORAL
Qty: 30 TAB | Refills: 1 | Status: SHIPPED | OUTPATIENT
Start: 2018-10-21 | End: 2020-11-13 | Stop reason: ALTCHOICE

## 2018-10-18 NOTE — TELEPHONE ENCOUNTER
Spoke with patient. Two pt identifiers confirmed. Patient notified of her recent lab results per Dr. Tiffanie Villela.  Patient states that she has been taking her synthroid everyday and needs to know what she is to do with her dose now. Advised patient that I will check with Dr. Edgar Saunders and will give her a call back with instructions. Pt verbalized understanding of information discussed w/ no further questions at this time.

## 2018-10-18 NOTE — TELEPHONE ENCOUNTER
Matt Members, MD Alo Mario LPN   Caller: Unspecified Jailene Townsend, 12:53 PM)             Patient should increase levothyroxine dose 3 times a week should take 75 mcg and the rest of the days she should take 50 mcg       Left message for patient with the above instructions per Dr. Roma Cruz.

## 2018-10-18 NOTE — DISCHARGE INSTRUCTIONS
Musculoskeletal Chest Pain: Care Instructions  Your Care Instructions    Chest pain is not always a sign that something is wrong with your heart or that you have another serious problem. The doctor thinks your chest pain is caused by strained muscles or ligaments, inflamed chest cartilage, or another problem in your chest, rather than by your heart. You may need more tests to find the cause of your chest pain. Follow-up care is a key part of your treatment and safety. Be sure to make and go to all appointments, and call your doctor if you are having problems. It's also a good idea to know your test results and keep a list of the medicines you take. How can you care for yourself at home? · Take pain medicines exactly as directed. ? If the doctor gave you a prescription medicine for pain, take it as prescribed. ? If you are not taking a prescription pain medicine, ask your doctor if you can take an over-the-counter medicine. · Rest and protect the sore area. · Stop, change, or take a break from any activity that may be causing your pain or soreness. · Put ice or a cold pack on the sore area for 10 to 20 minutes at a time. Try to do this every 1 to 2 hours for the next 3 days (when you are awake) or until the swelling goes down. Put a thin cloth between the ice and your skin. · After 2 or 3 days, apply a heating pad set on low or a warm cloth to the area that hurts. Some doctors suggest that you go back and forth between hot and cold. · Do not wrap or tape your ribs for support. This may cause you to take smaller breaths, which could increase your risk of lung problems. · Mentholated creams such as Bengay or Icy Hot may soothe sore muscles. Follow the instructions on the package. · Follow your doctor's instructions for exercising. · Gentle stretching and massage may help you get better faster. Stretch slowly to the point just before pain begins, and hold the stretch for at least 15 to 30 seconds.  Do this 3 or 4 times a day. Stretch just after you have applied heat. · As your pain gets better, slowly return to your normal activities. Any increased pain may be a sign that you need to rest a while longer. When should you call for help? Call 911 anytime you think you may need emergency care. For example, call if:    · You have chest pain or pressure. This may occur with:  ? Sweating. ? Shortness of breath. ? Nausea or vomiting. ? Pain that spreads from the chest to the neck, jaw, or one or both shoulders or arms. ? Dizziness or lightheadedness. ? A fast or uneven pulse. After calling 911, chew 1 adult-strength aspirin. Wait for an ambulance. Do not try to drive yourself.     · You have sudden chest pain and shortness of breath, or you cough up blood.    Call your doctor now or seek immediate medical care if:    · You have any trouble breathing.     · Your chest pain gets worse.     · Your chest pain occurs consistently with exercise and is relieved by rest.    Watch closely for changes in your health, and be sure to contact your doctor if:    · Your chest pain does not get better after 1 week. Where can you learn more? Go to http://efrain-virgen.info/. Enter V293 in the search box to learn more about \"Musculoskeletal Chest Pain: Care Instructions. \"  Current as of: November 20, 2017  Content Version: 11.8  © 0945-7487 [employer]. Care instructions adapted under license by [employer] (which disclaims liability or warranty for this information). If you have questions about a medical condition or this instruction, always ask your healthcare professional. [place] [address] any warranty or liability for your use of this information. Torticollis: Care Instructions  Your Care Instructions  Torticollis is a severe tightness of the muscles on one side of the neck.  The tight muscles can make the head turn to one side, lean to one side, or be pulled forward or backward. It is also called wryneck. Your doctor asked questions about your health and examined you. You may also have had X-rays or other tests. If your doctor thinks another medical problem is causing your tight neck muscles, you may need more tests. Torticollis usually gets better with home care. Your doctor may have you take medicine to relieve pain or relax your muscles. He or she may suggest exercise and physical therapy to help increase flexibility and relieve stress. Your doctor may also have you wear a special collar, called a cervical collar, for a day or two. The collar may help make your neck more comfortable. Follow-up care is a key part of your treatment and safety. Be sure to make and go to all appointments, and call your doctor if you are having problems. It's also a good idea to know your test results and keep a list of the medicines you take. How can you care for yourself at home? · Be safe with medicines. Read and follow all instructions on the label. ? If the doctor gave you a prescription medicine for pain, take it as prescribed. ? If you are not taking a prescription pain medicine, ask your doctor if you can take an over-the-counter medicine. · Try using a heating pad on a low or medium setting for 15 to 20 minutes every 2 or 3 hours. Try a warm shower in place of one session with the heating pad. · Try using an ice pack for 10 to 15 minutes every 2 to 3 hours. Put a thin cloth between the ice and your skin. · If your doctor recommends a cervical collar, wear it exactly as directed. When should you call for help?   Call your doctor now or seek immediate medical care if:    · You have new or worse numbness in your arms, buttocks, or legs.     · You have new or worse weakness in your arms or legs.     · Your neck pain gets worse.     · You lose bladder or bowel control.    Watch closely for changes in your health, and be sure to contact your doctor if:    · You do not get better as expected. Where can you learn more? Go to http://efrain-virgen.info/. Enter H687 in the search box to learn more about \"Torticollis: Care Instructions. \"  Current as of: November 29, 2017  Content Version: 11.8  © 3126-4364 [employer]. Care instructions adapted under license by [employer] (which disclaims liability or warranty for this information). If you have questions about a medical condition or this instruction, always ask your healthcare professional. [address] any warranty or liability for your use of this information.

## 2018-10-22 ENCOUNTER — OFFICE VISIT (OUTPATIENT)
Dept: INTERNAL MEDICINE CLINIC | Age: 58
End: 2018-10-22

## 2018-10-22 VITALS
OXYGEN SATURATION: 100 % | BODY MASS INDEX: 20.61 KG/M2 | SYSTOLIC BLOOD PRESSURE: 99 MMHG | DIASTOLIC BLOOD PRESSURE: 50 MMHG | RESPIRATION RATE: 18 BRPM | HEART RATE: 78 BPM | WEIGHT: 112 LBS | HEIGHT: 62 IN | TEMPERATURE: 99 F

## 2018-10-22 DIAGNOSIS — R51.9 NONINTRACTABLE HEADACHE, UNSPECIFIED CHRONICITY PATTERN, UNSPECIFIED HEADACHE TYPE: Primary | ICD-10-CM

## 2018-10-22 DIAGNOSIS — H93.A9 PULSATILE TINNITUS: ICD-10-CM

## 2018-10-22 RX ORDER — ACETAMINOPHEN AND CODEINE PHOSPHATE 300; 30 MG/1; MG/1
1 TABLET ORAL
Qty: 30 TAB | Refills: 1 | Status: SHIPPED | OUTPATIENT
Start: 2018-10-22 | End: 2018-10-23 | Stop reason: SINTOL

## 2018-10-22 NOTE — PROGRESS NOTES
SUBJECTIVE  Ms. Shady Chaudhary presents today acutely for     Chief Complaint   Patient presents with    Neck Pain     pt here today c.o neck pain/back pain and headaches-- pt was seen at [facility] ER        \"I twisted wrong. \"  She had sudden onset of \"Shooting pain, so excruciating it dropped me to the floor. \"     Went to ER:   Shady Chaudhary is a 59 y.o. female, pmhx thyroid disease, who presents via EMS to the ED c/o bilateral neck and shoulder spasms that radiate to her middle back and anterior chest wall x 3 days. Pt recently went on a 9 mile hike and did not stretch prior and attributes the pain to that. Pt feels the twisting motion she performed made her \"pull her muscles. \" She went to see her chiropractor on Monday and today, but is not feeling better. She called EMS because she felt momentarily dizzy and developed nausea. She has been tired a lot recently, but her PCP did blood work showing her TSH level elevated. She has a headache from the muscle spasms in her neck.         She specifically denies any recent fevers, chills, , vomiting, diarrhea, abd pain, urinary sxs, changes in BM. She was given Rx for flexeril and naproxen. Has been gone from school two days this week. OBJECTIVE  Visit Vitals  BP 99/50 (BP 1 Location: Left arm, BP Patient Position: Sitting)   Pulse 78   Temp 99 °F (37.2 °C) (Oral)   Resp 18   Ht 5' 2\" (1.575 m)   Wt 112 lb (50.8 kg)   SpO2 100%   BMI 20.49 kg/m²     Gen: Pleasant 59 y.o.  female in NAD.   HEENT: PERRLA. EOMI. OP moist and pink.  Neck: Supple.  No LAD.  HEART: RRR, No M/G/R.   LUNGS: CTAB No W/R.   ABDOMEN: S, NT, ND, BS+.   EXTREMITIES: Warm. No C/C/E. MUSCULOSKELETAL: +TTP posterior neck musculature. Susan Houston NEURO: Alert and oriented x 3.  Cranial nerves grossly intact.  No focal sensory or motor deficits noted. SKIN: Warm. Dry. No rashes or other lesions noted. ASSESSMENT / PLAN    ICD-10-CM ICD-9-CM    1.  Nonintractable headache, unspecified chronicity pattern, unspecified headache type R51 784.0 acetaminophen-codeine (TYLENOL #3) 300-30 mg per tablet      REFERRAL TO NEUROLOGY   2. Pulsatile tinnitus H93. A9 388.30 DUPLEX CAROTID BILATERAL       I have reviewed with the patient details of the assessment and plan and all questions were answered. Relevant patient education was performed. Follow-up Disposition:  Return if symptoms worsen or fail to improve.

## 2018-10-22 NOTE — LETTER
NOTIFICATION RETURN TO WORK / SCHOOL 
 
10/22/2018 3:14 PM 
 
Ms. Shady Chaudhary [address] [facility] [id number] To Whom It May Concern: 
 
Shady Chaudhary is currently under the care of [facility]. She will return to work/school on: 10/24/18 If there are questions or concerns please have the patient contact our office.  
 
 
 
Sincerely, 
 
 
Staci Geronimo MD

## 2018-10-23 ENCOUNTER — OFFICE VISIT (OUTPATIENT)
Dept: INTERNAL MEDICINE CLINIC | Age: 58
End: 2018-10-23

## 2018-10-23 ENCOUNTER — HOSPITAL ENCOUNTER (OUTPATIENT)
Dept: VASCULAR SURGERY | Age: 58
Discharge: HOME | End: 2018-10-23
Attending: INTERNAL MEDICINE
Payer: COMMERCIAL

## 2018-10-23 VITALS
DIASTOLIC BLOOD PRESSURE: 76 MMHG | OXYGEN SATURATION: 100 % | BODY MASS INDEX: 20.57 KG/M2 | HEIGHT: 62 IN | RESPIRATION RATE: 14 BRPM | TEMPERATURE: 97.7 F | HEART RATE: 74 BPM | WEIGHT: 111.8 LBS | SYSTOLIC BLOOD PRESSURE: 126 MMHG

## 2018-10-23 DIAGNOSIS — M62.830 MUSCLE SPASM OF BACK: Primary | ICD-10-CM

## 2018-10-23 DIAGNOSIS — H93.A9 PULSATILE TINNITUS: ICD-10-CM

## 2018-10-23 PROCEDURE — 93880 EXTRACRANIAL BILAT STUDY: CPT

## 2018-10-23 RX ORDER — ONDANSETRON 4 MG/1
4 TABLET, ORALLY DISINTEGRATING ORAL
Qty: 30 TAB | Refills: 0 | Status: SHIPPED | OUTPATIENT
Start: 2018-10-23 | End: 2019-03-14 | Stop reason: ALTCHOICE

## 2018-10-23 RX ORDER — DIAZEPAM 2 MG/1
2 TABLET ORAL
Qty: 20 TAB | Refills: 0 | Status: SHIPPED | OUTPATIENT
Start: 2018-10-23 | End: 2019-03-14 | Stop reason: ALTCHOICE

## 2018-10-23 NOTE — PROGRESS NOTES
[id number] [facility]  Preliminary Report:  Carotid Duplex Scan    Right:  No plaque noted in the right carotid system. Right ICA velocities suggest 0% diameter reduction. Right vertebral artery flow is antegrade. Left:  No plaque noted in the left carotid system. Left ICA velocities suggest 0% diameter reduction. Left vertebral artery flow is antegrade. Final report to follow.

## 2018-10-23 NOTE — PROGRESS NOTES
Chief Complaint   Patient presents with    Medication Evaluation    Head Pain     9 days    LOW BACK PAIN     9 days    Neck Pain     9 days     1. Have you been to the ER, urgent care clinic since your last visit? Hospitalized since your last visit? No    2. Have you seen or consulted any other health care providers outside of the [facility] since your last visit? Include any pap smears or colon screening.  No

## 2018-10-23 NOTE — PROCEDURES
[facility]  *** FINAL REPORT ***    Name: Fanny Muñoz  MRN: [id number]    Outpatient  : 1960  HIS Order #: [id number]  [address] [facility] Visit #: [id number]  Date: 23 Oct 2018    TYPE OF TEST: Cerebrovascular Duplex    REASON FOR TEST  Tinnitus    Right Carotid:-             Proximal               Mid                 Distal  cm/s  Systolic  Diastolic  Systolic  Diastolic  Systolic  Diastolic  CCA:     32.7      31.0                            77.0      24.0  Bulb:  ECA:     88.0      20.0  ICA:     65.0      28.0       76.0      34.0       85.0      34.0  ICA/CCA:  0.8       1.2    ICA Stenosis: Normal    Right Vertebral:-  Finding: Antegrade  Sys:       45.0  Sherry:       15.0    Right Subclavian: Normal    Left Carotid:-            Proximal                Mid                 Distal  cm/s  Systolic  Diastolic  Systolic  Diastolic  Systolic  Diastolic  CCA:     81.6      33.0                            81.0      27.0  Bulb:  ECA:     53.0      10.0  ICA:     57.0      25.0       64.0      31.0       76.0      36.0  ICA/CCA:  0.7       0.9    ICA Stenosis: Normal    Left Vertebral:-  Finding: Antegrade  Sys:       63.0  Sherry:       27.0    Left Subclavian: Normal    INTERPRETATION/FINDINGS  PROCEDURE:  Carotid Duplex Examination using B-mode, color and  spectral Doppler of the extracranial cerebrovascular arteries. 1. No evidence of significant arterial occlusive disease in the  internal carotid arteries. 2. No significant stenosis in the external carotid arteries  bilaterally. 3. Antegrade flow in both vertebral arteries. 4. Normal flow in both subclavian arteries. ADDITIONAL COMMENTS    I have personally reviewed the data relevant to the interpretation of  this  study. TECHNOLOGIST: Bernardo Sheffield. SOFIE Hand, RDMS  Signed: 10/23/2018 03:04 PM    PHYSICIAN: Sumi Hodgson MD  Signed: 10/24/2018 01:07 PM

## 2018-10-23 NOTE — PROGRESS NOTES
Cheyanne Jon is a 59 y.o. female who presents for evaluation of sick visit. Continues with neck and back spasms. Typically follows with dr Mayda Mukherjee. Initially injured neck on oct 14. Saw dr Pooja Martinez here on 15th with flu exposure, rx given for tamiflu. Went to ed on oct 17 as her neck pain worsened. rx given for flexeril and naprosyn. Pain and spasms did not improve. Saw a chiropractor during this time as well, and his interventions did not help either. Saw dr davenport here yesterday, oct 22, with continued neck pain. rx given for t#3, and carotid ultrasound ordered. Woke up this am and neck pain persisted, and was having new low back pain. She took a t#3, and then a naprosyn, then started to vomit, which she did for most of the morning and into mid afternoon. Made an urgent appt today.       ROS:  Constitutional: negative for fevers, chills, anorexia and weight loss  Eyes:   negative for visual disturbance and irritation  ENT:   negative for tinnitus,sore throat,nasal congestion,ear pain,hoarseness  Respiratory:  negative for cough, hemoptysis, dyspnea,wheezing  CV:   negative for chest pain, palpitations, lower extremity edema  GI:   negative for nausea, vomiting, diarrhea, abdominal pain,melena  Genitourinary: negative for frequency, dysuria and hematuria  Musculoskel: negative for myalgias, arthralgias, back pain, muscle weakness, joint pain  Neurological:  negative for headaches, dizziness, focal weakness, numbness  Psychiatric:     Negative for depression or anxiety      Past Medical History:   Diagnosis Date    Anxiety     Anxiety     Endocrine disease     hypothyroid    Gallbladder polyp     Thyroid disease        Past Surgical History:   Procedure Laterality Date    HX OVARIAN CYST REMOVAL      HX OVARIAN CYST REMOVAL Right        Family History   Problem Relation Age of Onset    Hypertension Mother     No Known Problems Father     No Known Problems Sister     No Known Problems Brother     No Known Problems Maternal Aunt     No Known Problems Maternal Uncle     No Known Problems Paternal Aunt     No Known Problems Paternal Uncle     Diabetes Maternal Grandmother     Cancer Maternal Grandfather     Cancer Paternal Grandmother     No Known Problems Paternal Grandfather        Social History     Socioeconomic History    Marital status:      Spouse name: Not on file    Number of children: Not on file    Years of education: Not on file    Highest education level: Not on file   Social Needs    Financial resource strain: Not on file    Food insecurity - worry: Not on file    Food insecurity - inability: Not on file   [employer] needs - medical: Not on file   [employer] needs - non-medical: Not on file   Occupational History    Not on file   Tobacco Use    Smoking status: Never Smoker    Smokeless tobacco: Never Used   Substance and Sexual Activity    Alcohol use: Yes     Comment: rarealy    Drug use: No    Sexual activity: Yes     Partners: Male     Birth control/protection: Condom   Other Topics Concern    Not on file   Social History Narrative    ** Merged History Encounter **                 Visit Vitals  /76 (BP 1 Location: Left arm, BP Patient Position: Sitting)   Pulse 74   Temp 97.7 °F (36.5 °C) (Oral)   Resp 14   Ht 5' 2\" (1.575 m)   Wt 111 lb 12.8 oz (50.7 kg)   SpO2 100%   BMI 20.45 kg/m²       Physical Examination:   General - Well appearing female  HEENT - PERRL, TM no erythema/opacification, normal nasal turbinates, no oropharyngeal erythema or exudate, MMM  Neck - supple, no bruits, no thyroidomegaly, no lymphadenopathy  Pulm - clear to auscultation bilaterally  Cardio - RRR, normal S1 S2, no murmur  Abd - soft, nontender, no masses, no HSM. Benign exam  Extrem - no edema, +2 distal pulses  Neuro-  No focal deficits, CN intact     Assessment/Plan:    1. Cervical and lumbar spasms--stop t#3, continue naprosyn. Add ice 15 minutes bid. rx given for valium qhs.  2.  Vomiting--suspect from codeine. rx for zofran. Advised to take meds with food. 3.  Subclinical hypothyroid--on synthroid now  4.   Anxiety and depression--continue effexor    rtc prn, follows with dr Shaun Fam,

## 2018-10-23 NOTE — PATIENT INSTRUCTIONS
Back Spasm: Care Instructions  Your Care Instructions  A back spasm is sudden tightness and pain in your back muscles. It may happen from overuse or an injury. Things like sleeping in an awkward way, bending, lifting, standing, or sitting can sometimes cause a spasm. But the cause isn't always clear. Home treatment includes using heat or ice, taking over-the-counter (OTC) pain medicines, and avoiding activities that may cause back pain. For a back spasm that doesn't get better with home care, your doctor may prescribe medicine. Treatments such as massage or manipulation may also help ease a back spasm. Your doctor may also suggest exercise or physical therapy to help improve strength and flexibility in your back muscles. In most cases, getting back to your normal activities is good for your back. Just make sure to avoid doing things that make your pain worse. Follow-up care is a key part of your treatment and safety. Be sure to make and go to all appointments, and call your doctor if you are having problems. It's also a good idea to know your test results and keep a list of the medicines you take. How can you care for yourself at home?   Heat, ice, and medicines    · To relieve pain, use heat or ice (whichever feels better) on the affected area. ? Put a warm water bottle, a heating pad set on low, or a warm cloth on your back. Put a thin cloth between the heating pad and your skin. Do not go to sleep with a heating pad on your skin. ? Try ice or a cold pack on the area for 10 to 20 minutes at a time. Put a thin cloth between the ice and your skin.     · For most back pain you can take over-the-counter pain medicine. Nonsteroidal anti-inflammatory drugs (NSAIDs) such as ibuprofen or naproxen seem to work best. But if you can't take NSAIDs you can try acetaminophen. Your doctor can prescribe stronger medicines if needed. Be safe with medicines.  Read and follow all instructions on the label.   [facility] positions and posture    · Sit or lie in positions that are most comfortable for you and that reduce pain. Try one of these positions when you lie down:  ? Lie on your back with your knees bent and supported by large pillows. ? Lie on the floor with your legs on the seat of a sofa or chair. ? Lie on your side with your knees and hips bent and a pillow between your legs. ? Lie on your stomach if it does not make pain worse.     · Do not sit up in bed. Avoid soft couches and twisted positions.     · Avoid bed rest after the first day of back pain. Bed rest can help relieve pain at first, but it delays healing. Continued rest without activity is usually not good for your back.     · If you must sit for long periods of time, take breaks from sitting. Change positions every 30 minutes. Get up and walk around, or lie in a comfortable position. Activity    · Take short walks several times a day. You can start with 5 to 10 minutes, 3 or 4 times a day, and work up to longer walks. Walk on level surfaces and avoid hills and stairs until your back starts to feel better.     · After your back spasm starts to feel better, try to stretch your muscles every day, especially before and after exercise and at bedtime. Regular stretching can help relax your muscles.     · To prevent future back pain, do exercises to stretch and strengthen your back and stomach. Learn to use good posture, safe lifting techniques, and other ways to move to help you avoid back pain. When should you call for help? Call 911 anytime you think you may need emergency care. For example, call if:    · You are unable to move an arm or a leg at all.   [facility] your doctor now or seek immediate medical care if:    · You have new or worse symptoms in your legs, belly, or buttocks. Symptoms may include:  ? Numbness or tingling. ? Weakness.   ? Pain.     · You lose bladder or bowel control.    Watch closely for changes in your health, and be sure to contact your doctor if:    · You have a fever, lose weight, or don't feel well.     · You do not get better as expected. Where can you learn more? Go to http://efrain-virgen.info/. Enter E232 in the search box to learn more about \"Back Spasm: Care Instructions. \"  Current as of: November 29, 2017  Content Version: 11.8  © 8688-7531 [employer]. Care instructions adapted under license by [employer] (which disclaims liability or warranty for this information). If you have questions about a medical condition or this instruction, always ask your healthcare professional. [address] any warranty or liability for your use of this information. Use ice to area for 15-20 minutes twice a day. Take naprosyn twice a day with food. Use valium at bedtime, as it will likely make you sleepy. Don't drink any alcohol with valium. Stop flexeril, stop tylenol with codeine. Use zofran, try to get some nutrition in your system.

## 2018-10-24 ENCOUNTER — TELEPHONE (OUTPATIENT)
Dept: INTERNAL MEDICINE CLINIC | Age: 58
End: 2018-10-24

## 2018-10-24 NOTE — TELEPHONE ENCOUNTER
Pt's  , Shabnam Flynn, would like for the PCP to give him a call in regards to his wife medication.  Best contact number is [phone]       [facility] & [facility]

## 2018-10-24 NOTE — TELEPHONE ENCOUNTER
Dr. Yaya Moffett Patient  Last seen by Dr. Rosa Maria Baer, 10/23/18, states she needs a call back asap in reference to her office visit yesterday & also needs to discuss why call back was not received back last night as a message was left by her  Raffaele Phillips & advised that this was urgent. Patient states she's been having headaches for 11 days straight & was seen yesterday & call was made last night due to patient vomited 7 times & needed to be advised what to do. Patient states she was also advised to get Carotid Artery test done & never heard back about getting that done & she is a  & can't keep missing time from work. Patient states she also wants to requests additional test be done as she is very worried/concerned why she has been having these persistent headaches. Please call asap.  Thank you

## 2018-10-24 NOTE — TELEPHONE ENCOUNTER
Spoke with patient. Patient disappointment in service received. Said  called on call last night, but never heard from physician. Per on call doctor, never received a call last night. In regards to testing done yesterday, patient notified turn around time for results are 24-48 hours. Patient would like to changed PCP to full time physician which patient prefers a female doctor. Encouraged to schedule an appointment with Dr. Eva Ragsdale or Dr. Adelita Yost. Patient declined to schedule at this time. Patient waiting to hear back from office regarding testing results.

## 2018-10-25 ENCOUNTER — TELEPHONE (OUTPATIENT)
Dept: INTERNAL MEDICINE CLINIC | Age: 58
End: 2018-10-25

## 2018-10-25 DIAGNOSIS — M54.12 CERVICAL RADICULOPATHY: ICD-10-CM

## 2018-10-25 DIAGNOSIS — M54.2 NECK PAIN: Primary | ICD-10-CM

## 2018-10-25 NOTE — TELEPHONE ENCOUNTER
2 pt identifiers verified- pt informed that Dr Manoj De La Cruz is out to the office today and that once he returns on tomorrow, he will advise on results.

## 2018-10-25 NOTE — TELEPHONE ENCOUNTER
Attempted to contact pt to discuss results on duplex. Reviewed imaging report with Dr. Vlad Bueno and wanted to reassure pt on normal/negative results. Per Dr. Vlad Bueno, if pt still continuing to feel pain in her neck, Dr. Vlad Bueno would like to send off for an xray or MRI of her neck. Message left for pt to return call.

## 2018-10-25 NOTE — TELEPHONE ENCOUNTER
Pt returning missed call in regards to test results.  Best contact [phone].       Message received & copied from [facility]

## 2018-10-25 NOTE — TELEPHONE ENCOUNTER
Patient states she needs a call back asap to get test results from 10/22/18 that was ordered but Dr. Mik Gaona for her acute visit with him on 10/22/18.  Please call Thank you

## 2018-10-25 NOTE — TELEPHONE ENCOUNTER
Called, spoke to pt. Two identifiers confirmed. Notified pt that Dr. Edgar Saunders reviewed results of duplex and results are normal.   Pt stated that her neck is no longer bothering her but her lower back is still painful. Pt also stated that she is no longer having headaches. Notified pt that if her neck pain comes back to call the office so Dr. Edgar Saunders can further evaluate MRI vs XRAY. Pt stated she will call if anything changes. No further questions at this time.

## 2018-10-29 NOTE — TELEPHONE ENCOUNTER
Spoke with patient. Two pt identifiers confirmed. Patient states that she is still having a lot of neck pain. Patient states that it is now effecting her hearing and every time she blows her nose, she get a nose bleed. Patient states that the pain is making it very difficult for her to function. Patient states that she thinks that she needs more than an x ray and would like for  to order the MRI for her. Advised patient that I will send her request to Dr. Dunn Friday and I will call her back. Pt verbalized understanding of information discussed w/ no further questions at this time.

## 2018-10-29 NOTE — TELEPHONE ENCOUNTER
Spoke with patient. Two pt identifiers confirmed. Patient advised that Dr. Teaj Connell has ordered the MRI of her neck. Patient given scheduling number as she would like to call and schedule. Pt verbalized understanding of information discussed w/ no further questions at this time.

## 2018-10-29 NOTE — TELEPHONE ENCOUNTER
#[phone] pt states she wants to speak directly to Dr Jeanie Shankar due to the pinched nerve that is affecting everything with her body. This is inter fearing with everything for the past two weeks. #[phone] pt states it is very urgent she speak with someone today, not 24 hours due to her pain level. She states it is unbearable and wants a call back now to get to the bottom of this. 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

## 2018-11-02 ENCOUNTER — TELEPHONE (OUTPATIENT)
Dept: INTERNAL MEDICINE CLINIC | Age: 58
End: 2018-11-02

## 2018-11-02 NOTE — TELEPHONE ENCOUNTER
Hilda//[facility] Fin[employer] Clearance states she's calling to advise that patient's MRI of Cervical Spine has been denied & Peer to Peer is required. Please call if any questions. Call for Peer to Peer [phone] Option 1 then opt. 1 then opt. 4    Case # is [id number]    Thank you

## 2018-11-05 NOTE — TELEPHONE ENCOUNTER
Left message for patient that she will need to schedule an appointment to see Dr Genesis Payne to have the MRI approved . Dr Tanja Hall did not see the patient for the issues that she is having.    Advised patient to contact the office to schedule an appointment

## 2018-11-05 NOTE — TELEPHONE ENCOUNTER
Attempted to call patient. Patient will need to make an appt to see Dr Jered Ba to have the DAVID approved . Dr Cary Ku did not see the patient for the issues that she is having.

## 2018-11-05 NOTE — TELEPHONE ENCOUNTER
#[phone] pt isn't worried about an appt with us or the MRI. She has an appt with a neurologist on 11-8-18 and wants to know who made that appt for her? Did we? Please leave this information only on her vm. She will deal with the rest at a later time.

## 2018-11-05 NOTE — TELEPHONE ENCOUNTER
Left message for patient that I see where we referred her to neurology, but I do not see where anyone here scheduled her an appointment. Advised patient to call the neurology office for more information. Advised patient to call our office with any additional questions.

## 2018-11-05 NOTE — TELEPHONE ENCOUNTER
#[phone] pt is calling to ask about appt made for her with the neurologist?  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

## 2018-11-08 ENCOUNTER — OFFICE VISIT (OUTPATIENT)
Dept: NEUROLOGY | Age: 58
End: 2018-11-08

## 2018-11-08 VITALS
SYSTOLIC BLOOD PRESSURE: 118 MMHG | WEIGHT: 109 LBS | BODY MASS INDEX: 20.06 KG/M2 | DIASTOLIC BLOOD PRESSURE: 70 MMHG | HEIGHT: 62 IN | OXYGEN SATURATION: 98 % | HEART RATE: 72 BPM

## 2018-11-08 DIAGNOSIS — M54.81 CERVICO-OCCIPITAL NEURALGIA: Primary | ICD-10-CM

## 2018-11-08 DIAGNOSIS — G24.5 BLEPHAROSPASM: ICD-10-CM

## 2018-11-08 DIAGNOSIS — M62.838 MUSCLE SPASM: ICD-10-CM

## 2018-11-08 NOTE — PROGRESS NOTES
NEUROLOGY CLINIC NOTE    Patient ID:  Laurel Sanches  [id number]  50 y.o.  1960    Date of Consultation:  November 8, 2018    Referring Physician: Dr. Angelica Dhaliwal    Reason for Consultation: headaches    Chief Complaint   Patient presents with    New Patient     back pain and neck pain, Huber Miller due to pulled muscle        History of Present Illness:     Patient Active Problem List    Diagnosis Date Noted    Non-intractable vomiting with nausea 01/31/2018    Gallbladder polyp     Bronchitis 11/06/2015     Past Medical History:   Diagnosis Date    Anxiety     Anxiety     Endocrine disease     hypothyroid    Gallbladder polyp     Neurological disorder     Thyroid disease       Past Surgical History:   Procedure Laterality Date    HX OVARIAN CYST REMOVAL      HX OVARIAN CYST REMOVAL Right       Prior to Admission medications    Medication Sig Start Date End Date Taking? Authorizing Provider   levothyroxine (SYNTHROID) 75 mcg tablet Take 1 Tab by mouth Every Mon, Wed & Sun. 10/21/18  Yes Stanley Erwin MD   SYNTHROID 50 mcg tablet TAKE 1 TABLET DAILY BEFORE BREAKFAST 8/29/18  Yes Stanley Erwin MD   MULTIVITAMIN PO Take  by mouth. Yes Provider, Historical   diazePAM (VALIUM) 2 mg tablet Take 1 Tab by mouth nightly as needed. Max Daily Amount: 2 mg. 10/23/18   Ryan Ortiz III, DO   ondansetron (ZOFRAN ODT) 4 mg disintegrating tablet Take 1 Tab by mouth every eight (8) hours as needed for Nausea. 10/23/18   Ryan Ortiz III, DO   naproxen (NAPROSYN) 500 mg tablet Take 1 Tab by mouth two (2) times daily (with meals) for 30 days.  10/17/18 11/16/18  KANU Jean   venlafaxine-SR [facility] 75 mg capsule TAKE ONE CAPSULE BY MOUTH EVERY DAY  Patient not taking: Reported on 10/23/2018 3/23/18   Stanley Erwin MD     Allergies   Allergen Reactions    Morphine Nausea and Vomiting      Social History     Tobacco Use    Smoking status: Never Smoker    Smokeless tobacco: Never Used   Substance Use Topics    Alcohol use: Yes     Comment: rarealy      Family History   Problem Relation Age of Onset    Hypertension Mother     No Known Problems Father     No Known Problems Sister     No Known Problems Brother     No Known Problems Maternal Aunt     No Known Problems Maternal Uncle     No Known Problems Paternal Aunt     No Known Problems Paternal Uncle     Diabetes Maternal Grandmother     Cancer Maternal Grandfather     Cancer Paternal Grandmother     No Known Problems Paternal Grandfather         Subjective:      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 Radha Young for further evaluation of her headaches. Per patient she went on a 9 mile hike about 3 1/2 week PTC. Then 1 week after, woke up with back of the head pain and shooting pain up the head. Severe headache like it was going to explode. Also had chest, shoulder and upper back stiffness and tightness. She took Aleve and rested. She went to her Chiropractor and it made it worst.  She went to her PCPs office 10/15/2018. She was seen by Dr. Akshat Blank.  Note mentions also issues of low back ache, low-grade fever, chills, soft bowel movements and vomiting on 10/14/2018. Patient was prescribed Tamiflu. Laboratory workup was done. CBC, T4, vitamin D and CMP were unremarkable except for mild elevation of sodium and elevated TSH. Told to increase her levothyroxine dose. Patient was seen at the ER via EMS 10/17/2018. Complaint of bilateral neck and shoulder spasms that radiates to the mid back and anterior chest for 3 days. Felt dizzy and nauseous. Mentions headache from the muscle spasms in her neck. Patient was discharged on cyclobenzaprine and naproxen. Seen by Dr. Radha Young 10/22/2018. Again complaining of neck and back pain and headaches. Patient was prescribed Tylenol 3 and referred here.   Carotid Doppler was ordered for pulsatile tinnitus. Carotid Doppler done 10/23/2018 did not reveal any abnormalities. Patient again was seen by another doctor at her PCPs office 10/23/2018. Persistent issues with neck pain and back pain. Tylenol No. 3 and Naprosyn and started vomiting. Prescribe Zofran and Valium. Condition still persisted and cervical MRI was ordered but denied by her insurance. Per patient her condition has improved the past 4-5 days. No significant pain or headache. 4/10 back pain. Patient also mentions issues with chronic and frequent eye blinking since college. Sometimes causes issues with her eye use. Has not been evaluated by ophthalmology. Outside reports reviewed: ER, office note, labs, radiology    Review of Systems:    A comprehensive review of systems was performed:   Constitutional: positive for fatigue   Eyes: positive for none  Ears, nose, mouth, throat, and face: positive for hearing loss, ringing in the ears  Respiratory: positive for none  Cardiovascular: positive for skipped beats   Gastrointestinal: positive for nausea e  Genitourinary: positive for none  Integument/breast: positive for none  Hematologic/lymphatic: positive for none  Musculoskeletal: positive for myalgia   Neurological: positive for headache   Behavioral/Psych: positive for anxiety   Endocrine: positive for none  Allergic/Immunologic: positive for none      Objective:     Visit Vitals  /70   Pulse 72   Ht 5' 2\" (1.575 m)   Wt 109 lb (49.4 kg)   SpO2 98%   BMI 19.94 kg/m²     4/10 back pain    PHYSICAL EXAM:    General Appearance: Alert, patient appears stated age. General:  Well developed, well nourished patent in no apparent distress. Head/Face: The head is normocephalic and atraumatic. Eyes: Conjunctivae appear normal. Sclera appear normal and non-icteric. Nose (and Sinus):   No abnormality of the nose or sinuses is noted. Oral:   Throat is clear. Lymphatics:  No lymphadenopathy in the neck/head.    Neck and Thyroid:   No bruits noted in the neck. Respiratory:  Lungs clear to auscultation. Cardiovascular:  Palpation and auscultation: regular rate and rhythm. Extremity: No joint swelling, erythema or pedal edema. NEUROLOGICAL EXAM:    Appearance: The patient is well developed, well nourished, provides a coherent history and is in no acute distress. Mental Status: Oriented to time, place and person. Fluent, no aphasia or dysarthria. Mood and affect appropriate. Cranial Nerves:   Intact visual fields. VA 20/200 OU on near vision without correction (baseline wears reading glasses). Fundi are benign. DAREN, EOM's full, no nystagmus, no ptosis. Intermittent bilateral eye blinking. Facial sensation is normal. Corneal reflexes are intact. Facial movement is symmetric. Hearing is normal bilaterally. Palate is midline with normal elevation. Sternocleidomastoid and trapezius muscles are normal. Tongue is midline. Motor:  5/5 strength in upper and lower proximal and distal muscles. Normal bulk and tone. No pronator drift. Reflexes:   Deep tendon reflexes 2+/4 and symmetrical. Downgoing toes. Sensory:   Normal to cold, pinprick and vibration. Gait:  Normal gait. No Romberg. Can do tandem walking. Tremor:   No tremor noted. Cerebellar:  Intact FTN/FADIA/HTS. Neurovascular:  Normal heart sounds and regular rhythm, peripheral pulses intact, and no carotid bruits. Anterior head posture with shoulders rotated in    Imaging  CT Head    Labs Reviewed      Assessment:   Cervico-occipital neuralgia  Muscle spasm  Blepharospasm    Plan:   Neurological examination is nonfocal.  No indication to do any other neuroimaging at this time. Head CT without contrast was reviewed and revealed no significant abnormality. History and exam reveals elements of occipital neuralgia causing cervicogenic headaches secondary to poor anterior head posture. Patient was advised to correct her anterior head posture.   Use headrest while at work, at home and when driving. Do not carry anything on the shoulder. Use wireless headsets. He is only 1 or no pillow when sleeping at night. Patient may benefit from physical therapy. Continue naproxen or NSAIDs as needed for abortive therapy. Episodes of muscle spasm likely related to overexertion. No evidence of exam of any stigmata some underlying neuromuscular disease. Should benefit from continued conservative management. Condition persists then EMG/NCS can be done. Patient mentions issues with excessive eye blinking. On exam the seem to be more intermittent in nature. Recommend evaluation by ophthalmology and if worsens then potential chemodenervation with Botox. All questions and concerns were answered. Visit lasted 45 minutes.   Greater than 50% was spent discussing her condition, potential etiology, prognosis, benign nature, review of medical records including head CT and laboratory workup, posture changes, exercises, discussion about blepharospasms and treatment options such as Botox

## 2018-11-08 NOTE — PATIENT INSTRUCTIONS
Neck Pain: Care Instructions  Your Care Instructions    You can have neck pain anywhere from the bottom of your head to the top of your shoulders. It can spread to the upper back or arms. Injuries, painting a ceiling, sleeping with your neck twisted, staying in one position for too long, and many other activities can cause neck pain. Most neck pain gets better with home care. Your doctor may recommend medicine to relieve pain or relax your muscles. He or she may suggest exercise and physical therapy to increase flexibility and relieve stress. You may need to wear a special (cervical) collar to support your neck for a day or two. Follow-up care is a key part of your treatment and safety. Be sure to make and go to all appointments, and call your doctor if you are having problems. It's also a good idea to know your test results and keep a list of the medicines you take. How can you care for yourself at home? · Try using a heating pad on a low or medium setting for 15 to 20 minutes every 2 or 3 hours. Try a warm shower in place of one session with the heating pad. · You can also try an ice pack for 10 to 15 minutes every 2 to 3 hours. Put a thin cloth between the ice and your skin. · Take pain medicines exactly as directed. ? If the doctor gave you a prescription medicine for pain, take it as prescribed. ? If you are not taking a prescription pain medicine, ask your doctor if you can take an over-the-counter medicine. · If your doctor recommends a cervical collar, wear it exactly as directed. When should you call for help? Call your doctor now or seek immediate medical care if:    · You have new or worsening numbness in your arms, buttocks or legs.     · You have new or worsening weakness in your arms or legs.  (This could make it hard to stand up.)     · You lose control of your bladder or bowels.    Watch closely for changes in your health, and be sure to contact your doctor if:    · Your neck pain is getting worse.     · You are not getting better after 1 week.     · You do not get better as expected. Where can you learn more? Go to http://efrain-virgen.info/. Enter [id number] in the search box to learn more about \"Neck Pain: Care Instructions. \"  Current as of: November 29, 2017  Content Version: 11.8  © 7633-2091 [employer]. Care instructions adapted under license by [employer] (which disclaims liability or warranty for this information). If you have questions about a medical condition or this instruction, always ask your healthcare professional. [place] [address] any warranty or liability for your use of this information. Eyelid Twitch: Care Instructions  Your Care Instructions  An eyelid twitch is a muscle spasm in your eyelid that you cannot control. Sometimes the eyelid closes or nearly closes and then opens again. You may have problems with one or both eyes. You may also be sensitive to bright light. Your eyelid muscles may twitch because you are tired or stressed. Drinking beverages with caffeine can also cause eyelid twitches. These twitches may bother you off and on for several days. This type of eyelid twitch is common and can be very annoying. Often, the eyelid twitch goes away while you sleep and starts again when you are awake. Most people do not even notice when the twitch stops. Your doctor may not be able to find a cause for your eyelid twitching. If your eyelid twitching is severe, you may consider getting botulinum toxin (Botox) injections. Follow-up care is a key part of your treatment and safety. Be sure to make and go to all appointments, and call your doctor if you are having problems. It's also a good idea to know your test results and keep a list of the medicines you take. How can you care for yourself at home? · Get more sleep, which can help relieve eyelid twitches. · Drink less caffeine.  It can cause muscle spasms in your eyes. · Use eyedrops to keep your eyes moist.  When should you call for help? Watch closely for changes in your health, and be sure to contact your doctor if:    · The twitching in your eyelid lasts longer than 1 week.     · You begin to have twitches in other parts of your face.     · You have redness or swelling of your eye.     · You have fluid leaking from your eye.     · Your eyelid closes completely.     · You do not get better as expected. Where can you learn more? Go to http://efrain-virgen.info/. Enter P214 in the search box to learn more about \"Eyelid Twitch: Care Instructions. \"  Current as of: November 20, 2017  Content Version: 11.8  © 7143-5702 [employer]. Care instructions adapted under license by [employer] (which disclaims liability or warranty for this information). If you have questions about a medical condition or this instruction, always ask your healthcare professional. [place] [address] any warranty or liability for your use of this information.

## 2018-11-20 ENCOUNTER — OFFICE VISIT (OUTPATIENT)
Dept: INTERNAL MEDICINE CLINIC | Age: 58
End: 2018-11-20

## 2018-11-20 VITALS
OXYGEN SATURATION: 98 % | HEIGHT: 62 IN | RESPIRATION RATE: 14 BRPM | HEART RATE: 71 BPM | WEIGHT: 110 LBS | SYSTOLIC BLOOD PRESSURE: 115 MMHG | TEMPERATURE: 98.7 F | BODY MASS INDEX: 20.24 KG/M2 | DIASTOLIC BLOOD PRESSURE: 71 MMHG

## 2018-11-20 DIAGNOSIS — M54.2 POSTERIOR NECK PAIN: Primary | ICD-10-CM

## 2018-11-20 DIAGNOSIS — H91.90 PERCEIVED HEARING CHANGES: ICD-10-CM

## 2018-11-20 DIAGNOSIS — R51.9 GENERALIZED HEADACHES: ICD-10-CM

## 2018-11-20 NOTE — PROGRESS NOTES
SUBJECTIVE  Ms. Jannet Roger presents today acutely for     Chief Complaint   Patient presents with    Headache     pt here today for headahces and back pain       Complains of headaches for 5-6 weeks. We saw her for this in October:     \"I twisted wrong. \"  She had sudden onset of \"Shooting pain, so excruciating it dropped me to the floor. \"     Went to ER:   Jannet Roger is a 59 y.o. female, pmhx thyroid disease, who presents via EMS to the ED c/o bilateral neck and shoulder spasms that radiate to her middle back and anterior chest wall x 3 days. Pt recently went on a 9 mile hike and did not stretch prior and attributes the pain to that. Pt feels the twisting motion she performed made her \"pull her muscles. \" She went to see her chiropractor on Monday and today, but is not feeling better. She called EMS because she felt momentarily dizzy and developed nausea. She has been tired a lot recently, but her PCP did blood work showing her TSH level elevated. She has a headache from the muscle spasms in her neck.         She specifically denies any recent fevers, chills, , vomiting, diarrhea, abd pain, urinary sxs, changes in BM. She was given Rx for flexeril and naproxen. Has been gone from school two days this week. \"I went to the neurologist, and he said it was all muscle. \"     From Dr. Iman Christensen note:     Jannet Roger is a 59 y.o. VHRV with history of thyroid disorder and anxiety who was referred here by Dr. Bear Green for further evaluation of her headaches.      Per patient she went on a 9 mile hike about 3 1/2 week PTC. Then 1 week after, woke up with back of the head pain and shooting pain up the head. Severe headache like it was going to explode. Also had chest, shoulder and upper back stiffness and tightness. She took Aleve and rested. She went to her Chiropractor and it made it worst.  She went to her PCPs office 10/15/2018.   She was seen by Dr. Bennie Sutton.  Note mentions also issues of low back ache, low-grade fever, chills, soft bowel movements and vomiting on 10/14/2018. Patient was prescribed Tamiflu. Laboratory workup was done. CBC, T4, vitamin D and CMP were unremarkable except for mild elevation of sodium and elevated TSH. Told to increase her levothyroxine dose. Patient was seen at the ER via EMS 10/17/2018. Complaint of bilateral neck and shoulder spasms that radiates to the mid back and anterior chest for 3 days. Felt dizzy and nauseous. Mentions headache from the muscle spasms in her neck. Patient was discharged on cyclobenzaprine and naproxen. Seen by Dr. Alma Rosa Barclay 10/22/2018. Again complaining of neck and back pain and headaches. Patient was prescribed Tylenol 3 and referred here. Carotid Doppler was ordered for pulsatile tinnitus. Carotid Doppler done 10/23/2018 did not reveal any abnormalities. Patient again was seen by another doctor at her PCPs office 10/23/2018. Persistent issues with neck pain and back pain. Tylenol No. 3 and Naprosyn and started vomiting. Prescribe Zofran and Valium. Condition still persisted and cervical MRI was ordered but denied by her insurance.     Per patient her condition has improved the past 4-5 days. No significant pain or headache. 4/10 back pain. Patient also mentions issues with chronic and frequent eye blinking since college. Sometimes causes issues with her eye use. Has not been evaluated by ophthalmology. He suggested it was cervicogenic, due to \"poor anterior head posture\" and recommended PT.  EMG/NCS could be contemplated if things fail to improve, to rule out neuromuscular disease. Went to chiropractor, and \"they made it worse. \"     She has begun physical therapy, and \"that's been helping. \"     Has been getting frequent headaches, waking her up at night. Still having posterior neck pain and stiffness. Advil has helped, but \"I only take when I really have to. \"    When the headache first hit, she had hearing loss. We did carotid duplex due to the pulsatile tinnitus, which was fine. She is requesting a head CT. OBJECTIVE  Visit Vitals  /71 (BP 1 Location: Left arm, BP Patient Position: Sitting)   Pulse 71   Temp 98.7 °F (37.1 °C) (Oral)   Resp 14   Ht 5' 2\" (1.575 m)   Wt 110 lb (49.9 kg)   SpO2 98%   BMI 20.12 kg/m²     Gen: Pleasant 59 y.o.  female in NAD.   HEENT: PERRLA. EOMI. OP moist and pink.  Neck: Supple.  No LAD.  HEART: RRR, No M/G/R.   LUNGS: CTAB No W/R.   ABDOMEN: S, NT, ND, BS+.   EXTREMITIES: Warm. No C/C/E. MUSCULOSKELETAL: Some posterior neck stiffness is noted. Normal ROM, muscle strength 5/5 all groups. NEURO: Alert and oriented x 3.  Cranial nerves grossly intact.  No focal sensory or motor deficits noted. SKIN: Warm. Dry. No rashes or other lesions noted. ASSESSMENT / PLAN    ICD-10-CM ICD-9-CM    1. Posterior neck pain M54.2 723.1 REFERRAL TO PHYSICAL THERAPY      CT HEAD WO CONT   2. Generalized headaches R51 784.0 REFERRAL TO PHYSICAL THERAPY      CT HEAD WO CONT   3. Perceived hearing changes H90.5 388.8 CT HEAD WO CONT       Continue PT.  OTC analgesics are okay to use. Can follow again with Dr. Mikal Lemos as desired. I have reviewed with the patient details of the assessment and plan and all questions were answered. Relevant patient education was performed. Follow-up Disposition:  Return if symptoms worsen or fail to improve.

## 2018-11-20 NOTE — PROGRESS NOTES
1. Have you been to the ER, urgent care clinic since your last visit? Hospitalized since your last visit? NO  2. Have you seen or consulted any other health care providers outside of the [facility] since your last visit? Include any pap smears or colon screening.  NO

## 2018-12-04 ENCOUNTER — TELEPHONE (OUTPATIENT)
Dept: INTERNAL MEDICINE CLINIC | Age: 58
End: 2018-12-04

## 2018-12-04 NOTE — TELEPHONE ENCOUNTER
#[phone] Michael Espinoza [employer] states you need to send the order for the CT of head w/o contrast to [address] [place] [address],[address] [facility] of [address] [place] [address].  Fax #[phone]  Once the order is received they can schedule with the patient she stated.

## 2018-12-05 NOTE — TELEPHONE ENCOUNTER
Emili Haro from [facility] is requesting for order  the ct scan of Pts  Head to be sent to [phone]       Message received & copied from [facility]

## 2018-12-05 NOTE — TELEPHONE ENCOUNTER
Karely Alvarez MD   You 1 minute ago (12:54 PM)      Please fax existing order for imaging on the chart ( ordered 11/20)      Order faxed and confirmation received.

## 2018-12-13 ENCOUNTER — TELEPHONE (OUTPATIENT)
Dept: INTERNAL MEDICINE CLINIC | Age: 58
End: 2018-12-13

## 2018-12-13 NOTE — TELEPHONE ENCOUNTER
Pt is requesting her CT scan results. Pts number is [phone].        Message received & copied from [facility]

## 2018-12-13 NOTE — TELEPHONE ENCOUNTER
Identified patient 2 identifiers verified. Patient requesting CT results. After viewing chart sent to   request for results were faxed to [phone].

## 2018-12-14 ENCOUNTER — TELEPHONE (OUTPATIENT)
Dept: INTERNAL MEDICINE CLINIC | Age: 58
End: 2018-12-14

## 2018-12-14 NOTE — TELEPHONE ENCOUNTER
Message was left for patient that CT Scan was normal and to contact this office for questions and concerns.

## 2019-03-14 ENCOUNTER — OFFICE VISIT (OUTPATIENT)
Dept: INTERNAL MEDICINE CLINIC | Age: 59
End: 2019-03-14

## 2019-03-14 VITALS
HEIGHT: 62 IN | OXYGEN SATURATION: 99 % | BODY MASS INDEX: 20.24 KG/M2 | TEMPERATURE: 98.1 F | HEART RATE: 75 BPM | RESPIRATION RATE: 18 BRPM | WEIGHT: 110 LBS

## 2019-03-14 DIAGNOSIS — Z12.11 SCREENING FOR COLON CANCER: ICD-10-CM

## 2019-03-14 DIAGNOSIS — E03.9 ACQUIRED HYPOTHYROIDISM: Primary | ICD-10-CM

## 2019-03-14 DIAGNOSIS — Z11.59 NEED FOR HEPATITIS C SCREENING TEST: ICD-10-CM

## 2019-03-14 NOTE — PROGRESS NOTES
Reviewed record in preparation for visit and have obtained necessary documentation. Identified pt with two pt identifiers(name and ). Chief Complaint   Patient presents with    Complete Physical       Health Maintenance Due   Topic Date Due    Hepatitis C Test  1960    Pap Test  1979    Shingles Vaccine (1 of 2) 2008    Stool testing for trace blood  2008    DTaP/Tdap/Td  (1 - Tdap) 2011    Mammogram  2011    Flu Vaccine  2018       Ms. Usman Mcqueen has a reminder for a \"due or due soon\" health maintenance. I have asked that she discuss this further with her primary care provider for follow-up on this health maintenance. Coordination of Care Questionnaire:  :     1) Have you been to an emergency room, urgent care clinic since your last visit? no   Hospitalized since your last visit? no             2) Have you seen or consulted any other health care providers outside of [address] [place] [address] since your last visit? no  (Include any pap smears or colon screenings in this section.)    3) In the event something were to happen to you and you were unable to speak on your behalf, do you have an Advance Directive/ Living Will in place stating your wishes? NO    Do you have an Advance Directive on file? no    4) Are you interested in receiving information on Advance Directives? NO    Patient is accompanied by self I have received verbal consent from Lina Hernandez to discuss any/all medical information while they are present in the room.

## 2019-03-14 NOTE — PROGRESS NOTES
CC: Complete Physical  hypothyroidism    HPI:    She is a 59 y.o. female who presents for evaluation of hypothyroidism  Feels well     Hypothyroidism: 50 mcg alternating with 75. Last TSH was 5    She complains of dry skin but does sit with the water in her house. She reports being up-to-date with her mammogram and Pap smear done at [facility] center. ROS:  Constitutional: negative for fevers, chills, anorexia and weight loss  Eyes:   negative for visual disturbance,  irritation  ENT:   negative for tinnitus,sore throat,nasal congestion,ear pain, sinus pain. Respiratory:  negative for cough, hemoptysis, dyspnea,wheezing  CV:   negative for chest pain, palpitations, lower extremity edema  GI:   negative for nausea, vomiting, diarrhea, abdominal pain,melena  Genitourinary: negative for frequency, dysuria, hematuria  Musculoskel: negative for myalgias, arthralgias, back pain, muscle weakness, joint pain  Neurological:  negative for headaches, dizziness, focal weakness, numbness  Psych:             Negative for depression and anxiety    Past Medical History:   Diagnosis Date    Anxiety     Anxiety     Endocrine disease     hypothyroid    Gallbladder polyp     Neurological disorder     Thyroid disease        Current Outpatient Medications on File Prior to Visit   Medication Sig Dispense Refill    levothyroxine (SYNTHROID) 75 mcg tablet Take 1 Tab by mouth Every Mon, Wed & Sun. 30 Tab 1    SYNTHROID 50 mcg tablet TAKE 1 TABLET DAILY BEFORE BREAKFAST 90 Tab 1    MULTIVITAMIN PO Take  by mouth. No current facility-administered medications on file prior to visit.         Past Surgical History:   Procedure Laterality Date    HX OVARIAN CYST REMOVAL      HX OVARIAN CYST REMOVAL Right        Family History   Problem Relation Age of Onset    Hypertension Mother     No Known Problems Father     No Known Problems Sister     No Known Problems Brother     No Known Problems Maternal Aunt     No Known Problems Maternal Uncle     No Known Problems Paternal Aunt     No Known Problems Paternal Uncle     Diabetes Maternal Grandmother     Cancer Maternal Grandfather     Cancer Paternal Grandmother     No Known Problems Paternal Grandfather      Reviewed and no changes     Social History     Socioeconomic History    Marital status:      Spouse name: Not on file    Number of children: Not on file    Years of education: Not on file    Highest education level: Not on file   Social Needs    Financial resource strain: Not on file    Food insecurity - worry: Not on file    Food insecurity - inability: Not on file   [employer] needs - medical: Not on file   [employer] needs - non-medical: Not on file   Occupational History    Not on file   Tobacco Use    Smoking status: Never Smoker    Smokeless tobacco: Never Used   Substance and Sexual Activity    Alcohol use: Yes     Comment: rarealy    Drug use:  Yes    Sexual activity: Yes     Partners: Male     Birth control/protection: Condom   Other Topics Concern    Not on file   Social History Narrative    ** Merged History Encounter **                 Visit Vitals  Pulse 75   Temp 98.1 °F (36.7 °C) (Oral)   Resp 18   Ht 5' 2\" (1.575 m)   Wt 110 lb (49.9 kg)   SpO2 99%   BMI 20.12 kg/m²       Physical Examination:   General - Well appearing female  HEENT - PERRL, TM no erythema/opacification, normal nasal turbinates, oropharynx no erythema or exudate, MMM  Neck - supple, no bruits, no TMG, no LAD  Pulm - clear to auscultation bilaterally  Cardio - RRR, normal S1 S2, no murmur gallops or rubs  Abd - soft, nontender, no masses, no HSM  Extrem - no edema, +2 distal pulses  Psych - normal affect, appropriate mood    Lab Results   Component Value Date/Time    WBC 4.9 10/15/2018 01:50 PM    HGB 14.0 10/15/2018 01:50 PM    HCT 42.2 10/15/2018 01:50 PM    PLATELET 900 45/01/6936 01:50 PM    MCV 90 10/15/2018 01:50 PM     Lab Results   Component Value Date/Time    Sodium 147 (H) 10/15/2018 01:50 PM    Potassium 3.8 10/15/2018 01:50 PM    Chloride 105 10/15/2018 01:50 PM    CO2 20 10/15/2018 01:50 PM    Anion gap 7 03/11/2015 06:05 AM    Glucose 83 10/15/2018 01:50 PM    BUN 10 10/15/2018 01:50 PM    Creatinine 0.73 10/15/2018 01:50 PM    BUN/Creatinine ratio 14 10/15/2018 01:50 PM    GFR est  10/15/2018 01:50 PM    GFR est non-AA 90 10/15/2018 01:50 PM    Calcium 10.0 10/15/2018 01:50 PM     Lab Results   Component Value Date/Time    Cholesterol, total 198 01/30/2018 08:01 AM    HDL Cholesterol 69 01/30/2018 08:01 AM    LDL, calculated 120 (H) 01/30/2018 08:01 AM    VLDL, calculated 9 01/30/2018 08:01 AM    Triglyceride 46 01/30/2018 08:01 AM     Lab Results   Component Value Date/Time    TSH 5.110 (H) 10/15/2018 01:50 PM     No results found for: PSA, PSA2, PSAR1, PSA1, PSAR2, PSA3, PSAR3, [id number], [id number], PSALT  No results found for: HBA1C, HGBE8, TZX5GTTS, RRC8LHNX, GNC2GPXP  Lab Results   Component Value Date/Time    VITAMIN D, 25-HYDROXY 45.5 10/15/2018 01:50 PM       Lab Results   Component Value Date/Time    ALT (SGPT) 14 10/15/2018 01:50 PM    AST (SGOT) 20 10/15/2018 01:50 PM    Alk. phosphatase 87 10/15/2018 01:50 PM    Bilirubin, total 0.4 10/15/2018 01:50 PM           Assessment/Plan:    1. Acquired hypothyroidism  - on levothyroxine 50 mcg alternating with 75 mcg   - TSH AND FREE T4  - METABOLIC PANEL, COMPREHENSIVE  - CBC WITH AUTOMATED DIFF    2. Need for hepatitis C screening test  - HEPATITIS C AB    3. Screening for colon cancer  - REFERRAL FOR COLONOSCOPY        Follow-up Disposition:  Return in about 6 months (around 9/14/2019) for thyroid.     Farida Segal MD

## 2019-03-15 LAB
ALBUMIN SERPL-MCNC: 4.4 G/DL (ref 3.6–4.8)
ALBUMIN/GLOB SERPL: 1.7 {RATIO} (ref 1.2–2.2)
ALP SERPL-CCNC: 74 IU/L (ref 39–117)
ALT SERPL-CCNC: 13 IU/L (ref 0–32)
AST SERPL-CCNC: 17 IU/L (ref 0–40)
BASOPHILS # BLD AUTO: 0 X10E3/UL (ref 0–0.2)
BASOPHILS NFR BLD AUTO: 1 %
BILIRUB SERPL-MCNC: 0.2 MG/DL (ref 0–1.2)
BUN SERPL-MCNC: 17 MG/DL (ref 8–27)
BUN/CREAT SERPL: 22 (ref 12–28)
CALCIUM SERPL-MCNC: 9.9 MG/DL (ref 8.7–10.3)
CHLORIDE SERPL-SCNC: 103 MMOL/L (ref 96–106)
CO2 SERPL-SCNC: 26 MMOL/L (ref 20–29)
CREAT SERPL-MCNC: 0.76 MG/DL (ref 0.57–1)
EOSINOPHIL # BLD AUTO: 0.1 X10E3/UL (ref 0–0.4)
EOSINOPHIL NFR BLD AUTO: 3 %
ERYTHROCYTE [DISTWIDTH] IN BLOOD BY AUTOMATED COUNT: 13.9 % (ref 12.3–15.4)
GLOBULIN SER CALC-MCNC: 2.6 G/DL (ref 1.5–4.5)
GLUCOSE SERPL-MCNC: 93 MG/DL (ref 65–99)
HCT VFR BLD AUTO: 39.7 % (ref 34–46.6)
HCV AB S/CO SERPL IA: <0.1 S/CO RATIO (ref 0–0.9)
HGB BLD-MCNC: 12.7 G/DL (ref 11.1–15.9)
IMM GRANULOCYTES # BLD AUTO: 0 X10E3/UL (ref 0–0.1)
IMM GRANULOCYTES NFR BLD AUTO: 0 %
LYMPHOCYTES # BLD AUTO: 2.1 X10E3/UL (ref 0.7–3.1)
LYMPHOCYTES NFR BLD AUTO: 38 %
MCH RBC QN AUTO: 29.7 PG (ref 26.6–33)
MCHC RBC AUTO-ENTMCNC: 32 G/DL (ref 31.5–35.7)
MCV RBC AUTO: 93 FL (ref 79–97)
MONOCYTES # BLD AUTO: 0.3 X10E3/UL (ref 0.1–0.9)
MONOCYTES NFR BLD AUTO: 5 %
NEUTROPHILS # BLD AUTO: 2.9 X10E3/UL (ref 1.4–7)
NEUTROPHILS NFR BLD AUTO: 53 %
PLATELET # BLD AUTO: 308 X10E3/UL (ref 150–379)
POTASSIUM SERPL-SCNC: 5 MMOL/L (ref 3.5–5.2)
PROT SERPL-MCNC: 7 G/DL (ref 6–8.5)
RBC # BLD AUTO: 4.28 X10E6/UL (ref 3.77–5.28)
SODIUM SERPL-SCNC: 140 MMOL/L (ref 134–144)
T4 FREE SERPL-MCNC: 1.28 NG/DL (ref 0.82–1.77)
TSH SERPL DL<=0.005 MIU/L-ACNC: 1.41 UIU/ML (ref 0.45–4.5)
WBC # BLD AUTO: 5.5 X10E3/UL (ref 3.4–10.8)

## 2019-03-18 NOTE — PROGRESS NOTES
Spoke with patient. Two pt identifiers confirmed. Patient notified of Normal labs, thyroid at goal   Continue current therapy   Pt verbalized understanding of information discussed w/ no further questions at this time.

## 2019-03-22 ENCOUNTER — OFFICE VISIT (OUTPATIENT)
Dept: PRIMARY CARE CLINIC | Age: 59
End: 2019-03-22

## 2019-03-22 VITALS
OXYGEN SATURATION: 98 % | TEMPERATURE: 98 F | WEIGHT: 110.2 LBS | HEIGHT: 62 IN | BODY MASS INDEX: 20.28 KG/M2 | DIASTOLIC BLOOD PRESSURE: 75 MMHG | HEART RATE: 63 BPM | SYSTOLIC BLOOD PRESSURE: 110 MMHG | RESPIRATION RATE: 18 BRPM

## 2019-03-22 DIAGNOSIS — J06.9 VIRAL URI WITH COUGH: Primary | ICD-10-CM

## 2019-03-22 RX ORDER — BENZONATATE 200 MG/1
200 CAPSULE ORAL
Qty: 30 CAP | Refills: 0 | Status: SHIPPED | OUTPATIENT
Start: 2019-03-22 | End: 2019-03-29

## 2019-03-22 NOTE — PATIENT INSTRUCTIONS

## 2019-03-22 NOTE — PROGRESS NOTES
Lina Hernandez is a 59 y.o. female    Chief Complaint   Patient presents with    Nasal Congestion     chest congestion    Cough     started about a week ago       Visit Vitals  /75 (BP 1 Location: Right arm, BP Patient Position: Sitting)   Pulse 63   Temp 98 °F (36.7 °C) (Oral)   Resp 18   Ht 5' 2\" (1.575 m)   Wt 110 lb 3.2 oz (50 kg)   SpO2 98%   BMI 20.16 kg/m²

## 2019-03-22 NOTE — PROGRESS NOTES
Subjective:   Trixie Hicks is a 59 y.o. female who complains of congestion, sore throat, nasal blockage, post nasal drip and dry cough for 7 days, gradually improving since that time. She denies a history of shortness of breath and wheezing. Evaluation to date: none. Treatment to date: OTC products. Patient does not smoke cigarettes. Relevant PMH: No pertinent additional PMH. Patient Active Problem List   Diagnosis Code    Bronchitis J40    Gallbladder polyp K82.4    Non-intractable vomiting with nausea R11.2     Patient Active Problem List    Diagnosis Date Noted    Non-intractable vomiting with nausea 01/31/2018    Gallbladder polyp     Bronchitis 11/06/2015     Current Outpatient Medications   Medication Sig Dispense Refill    benzonatate (TESSALON) 200 mg capsule Take 1 Cap by mouth three (3) times daily as needed for Cough for up to 7 days. 30 Cap 0    levothyroxine (SYNTHROID) 75 mcg tablet Take 1 Tab by mouth Every Mon, Wed & Sun. 30 Tab 1    SYNTHROID 50 mcg tablet TAKE 1 TABLET DAILY BEFORE BREAKFAST 90 Tab 1    MULTIVITAMIN PO Take  by mouth.        Allergies   Allergen Reactions    Morphine Nausea and Vomiting     Past Medical History:   Diagnosis Date    Anxiety     Anxiety     Endocrine disease     hypothyroid    Gallbladder polyp     Neurological disorder     Thyroid disease      Past Surgical History:   Procedure Laterality Date    HX OVARIAN CYST REMOVAL      HX OVARIAN CYST REMOVAL Right      Family History   Problem Relation Age of Onset    Hypertension Mother     No Known Problems Father     No Known Problems Sister     No Known Problems Brother     No Known Problems Maternal Aunt     No Known Problems Maternal Uncle     No Known Problems Paternal Aunt     No Known Problems Paternal Uncle     Diabetes Maternal Grandmother     Cancer Maternal Grandfather     Cancer Paternal Grandmother     No Known Problems Paternal Grandfather      Social History Tobacco Use    Smoking status: Never Smoker    Smokeless tobacco: Never Used   Substance Use Topics    Alcohol use: Yes     Comment: rarealy        Review of Systems  Pertinent items are noted in HPI. Objective:     Visit Vitals  /75 (BP 1 Location: Right arm, BP Patient Position: Sitting)   Pulse 63   Temp 98 °F (36.7 °C) (Oral)   Resp 18   Ht 5' 2\" (1.575 m)   Wt 110 lb 3.2 oz (50 kg)   SpO2 98%   BMI 20.16 kg/m²     General:  alert, cooperative, no distress   Eyes: negative   Ears: normal TM's and external ear canals AU   Sinuses: Normal paranasal sinuses without tenderness   Mouth:  Lips, mucosa, and tongue normal. Teeth and gums normal   Neck: supple, symmetrical, trachea midline and no adenopathy. Heart: S1 and S2 normal, no murmurs noted. Lungs: clear to auscultation bilaterally   Abdomen: soft, non-tender. Bowel sounds normal. No masses,  no organomegaly        Assessment/Plan:   viral upper respiratory illness  Suggested symptomatic OTC remedies. RTC prn. Discussed diagnosis and treatment of viral URIs. Discussed the importance of avoiding unnecessary antibiotic therapy. ICD-10-CM ICD-9-CM    1. Viral URI with cough J06.9 465.9 benzonatate (TESSALON) 200 mg capsule    B97.89     .

## 2019-05-20 DIAGNOSIS — E03.9 ACQUIRED HYPOTHYROIDISM: ICD-10-CM

## 2019-05-21 RX ORDER — LEVOTHYROXINE SODIUM 50 UG/1
TABLET ORAL
Qty: 90 TAB | Refills: 1 | Status: SHIPPED | OUTPATIENT
Start: 2019-05-21 | End: 2019-08-26 | Stop reason: SDUPTHER

## 2019-08-20 ENCOUNTER — OFFICE VISIT (OUTPATIENT)
Dept: NEUROSURGERY | Age: 59
End: 2019-08-20

## 2019-08-20 VITALS
HEART RATE: 64 BPM | HEIGHT: 62 IN | WEIGHT: 108 LBS | BODY MASS INDEX: 19.88 KG/M2 | SYSTOLIC BLOOD PRESSURE: 116 MMHG | OXYGEN SATURATION: 98 % | TEMPERATURE: 98.1 F | RESPIRATION RATE: 16 BRPM | DIASTOLIC BLOOD PRESSURE: 62 MMHG

## 2019-08-20 DIAGNOSIS — H93.A2 PULSATILE TINNITUS OF LEFT EAR: Primary | ICD-10-CM

## 2019-08-20 NOTE — PROGRESS NOTES
Neurointerventional Surgery Consult    Patient: Irene Serrano MRN: [id number]  SSN: [id number]    YOB: 1960  Age: 62 y.o. Sex: female      Subjective:      Irene Serrano is a 62 y.o. right-handed female with history of hypothyroidism and anxiety who was referred by Dr. Harleen Paz at [facility] for left sided pulsatile tinnitus. Patient states that she has experienced ringing in both ears for 6-7 years, but it has been worse on the left. Over the past 8 months, she has experienced that the ringing sound is now pulsatile. She notices it more at night when it is quiet. Bending over does not make it worse, but performing a valsalva maneuver however does. Nothing makes it go away. Around the same time that the pulsatile nature of the ringing sound started 8 months ago, patient had a \"whiplash\" like event while turning her neck, which caused nausea, dizziness, and she \"couldn't use muscles. \" This occurred after a long hike the day before. She experienced headaches and pain in the right neck, back, and down her spine. She saw the chiropractor the next day, and the pain only became worse. She is not sure if the ringing sound in her ear is related or not. Since then, her pain has improved. Her only complaint in my office is the pulsatile ringing in her left ear. She denies headaches, weakness, numbness/tingling, nausea/vomiting, and blurry vision. She does report a couple of short episodes of dizziness with light-headedness and imbalance that lasted for 20-30 seconds and occurred over the past few months. Evaluation by Dr. Harleen Paz revealed bilateral sensorineural hearing loss. An MRI of the IACs with and without contrast was recommended.     Past Medical History:   Diagnosis Date    Anxiety     Anxiety     Endocrine disease     hypothyroid    Gallbladder polyp     Neurological disorder     Thyroid disease      Past Surgical History:   Procedure Laterality Date    HX GYN      HX OVARIAN CYST REMOVAL      HX OVARIAN CYST REMOVAL Right       Family History   Problem Relation Age of Onset    Hypertension Mother     Dementia Father     No Known Problems Sister     No Known Problems Brother     No Known Problems Maternal Aunt     No Known Problems Maternal Uncle     No Known Problems Paternal Aunt     No Known Problems Paternal Uncle     Diabetes Maternal Grandmother     Cancer Maternal Grandfather     Cancer Paternal Grandmother     No Known Problems Paternal Grandfather      Social History     Tobacco Use    Smoking status: Never Smoker    Smokeless tobacco: Never Used   Substance Use Topics    Alcohol use: Yes     Comment: rarealy      Current Outpatient Medications   Medication Sig Dispense Refill    levothyroxine (SYNTHROID) 75 mcg tablet Take 1 Tab by mouth Every Mon, Wed & Sun. 30 Tab 1    SYNTHROID 50 mcg tablet TAKE 1 TABLET DAILY BEFORE BREAKFAST 90 Tab 1    MULTIVITAMIN PO Take  by mouth. Allergies   Allergen Reactions    Morphine Nausea and Vomiting       Review of Systems:  A comprehensive review of systems was negative except for that written in the History of Present Illness. Objective:     Vitals:    08/20/19 1025   BP: 116/62   Pulse: 64   Resp: 16   Temp: 98.1 °F (36.7 °C)   SpO2: 98%   Weight: 108 lb (49 kg)   Height: 5' 2\" (1.575 m)        Physical Exam:  GENERAL: alert, cooperative, no distress, appears stated age  EYE: negative  HEAD & NECK: No audible bruits in the neck or periauricular regions. No palpable thrills. LUNG: clear to auscultation bilaterally  HEART: regular rate and rhythm, S1, S2 normal, no murmur, click, rub or gallop  ABDOMEN: Bowel sounds normal  EXTREMITIES:  extremities normal, atraumatic, no cyanosis or edema    Neurologic Exam:  Mental Status:  Alert and oriented x 4. Appropriate affect, mood and behavior. Language:    Normal fluency, repetition, comprehension and naming.     Cranial Nerves:   Pupils equal, round and reactive to light. Visual fields full to confrontation. Extraocular movements intact. Facial sensation intact V1 - V3. Full facial strength, no asymmetry. Hearing intact bilaterally. No dysarthria. Tongue protrudes to midline, palate elevates symmetrically. Shoulder shrug 5/5 bilaterally. Motor:    No pronator drift. Bulk and tone normal.      5/5 power in all extremities proximally and distally. No involuntary movements. Sensation:    Sensation intact throughout to light touch    Reflexes:    Deferred    Coordination & Gait: Normal FTN. Normal gait. Imaging:    Bilateral carotid duplex 10/23/2018:  1. No evidence of significant arterial occlusive disease in the internal carotid arteries. 2. No significant stenosis in the external carotid arteries bilaterally. 3. Antegrade flow in both vertebral arteries. 4. Normal flow in both subclavian arteries. Assessment:     62 y.o. right-handed female with history of hypothyroidism and anxiety who was referred by Dr. Munira Maharaj at [facility] for left sided pulsatile tinnitus. The ringing, as opposed to \"whooshing,\" character of the sound in her ear is less consistent with an underlying neurovascular cause. That there are no objective findings on my exam and that the sound is unchanged with bending over and jugular vein compression also suggest a non-vascular cause. However, the pulsatile nature, subjective worsening with valsalva, and possible relation to a \"whiplash\" event 8 months ago raise the possibility of an underlying vascular source. Therefore, I recommend a CTA of the head and neck for further evaluation. I explained that pulsatile tinnitus could be caused by underlying vascular anatomy or pathology but that it could also be neurologic in origin and there is a possibility that we will not be able to make a determination.  I advised the patient to follow up with Dr. Mari Bravo recommendation to obtain an MRI of the IACs.    Plan:     - CTA Head & Neck with contrast  - We will call the patient once the study has been completed to discuss next steps  - Advised contacting Dr. Christian Layne office to set up MRI. I have discussed the diagnosis with the patient and the intended plan as seen in the above orders. Patient is in agreement. Greater than 60 minutes were spent in patient management, greater than half of which was spent in counseling and coordination of care. Thank you for this consult and for allowing me to participate in the care of this patient.       Signed By: Josr Flynn MD     August 20, 2019

## 2019-08-20 NOTE — PROGRESS NOTES
New patient referred by [facility] presenting with Pulsatile Tinnitus in left ear. Reports heartbeat and pulsating sound in left ear for the past 8 months. Worse at night. Reports ringing also in left ear at times.

## 2019-08-20 NOTE — PATIENT INSTRUCTIONS
Today we discussed the pulsatile ringing sound in your left ear. I recommend a CTA of the head and neck for further evaluation. I will call you to once the CTA has been completed to discuss further. Please contact [facility] regarding MRI of the internal auditory canals. A Healthy Lifestyle: Care Instructions  Your Care Instructions    A healthy lifestyle can help you feel good, stay at a healthy weight, and have plenty of energy for both work and play. A healthy lifestyle is something you can share with your whole family. A healthy lifestyle also can lower your risk for serious health problems, such as high blood pressure, heart disease, and diabetes. You can follow a few steps listed below to improve your health and the health of your family. Follow-up care is a key part of your treatment and safety. Be sure to make and go to all appointments, and call your doctor if you are having problems. It's also a good idea to know your test results and keep a list of the medicines you take. How can you care for yourself at home? · Do not eat too much sugar, fat, or fast foods. You can still have dessert and treats now and then. The goal is moderation. · Start small to improve your eating habits. Pay attention to portion sizes, drink less juice and soda pop, and eat more fruits and vegetables. ? Eat a healthy amount of food. A 3-ounce serving of meat, for example, is about the size of a deck of cards. Fill the rest of your plate with vegetables and whole grains. ? Limit the amount of soda and sports drinks you have every day. Drink more water when you are thirsty. ? Eat at least 5 servings of fruits and vegetables every day. It may seem like a lot, but it is not hard to reach this goal. A serving or helping is 1 piece of fruit, 1 cup of vegetables, or 2 cups of leafy, raw vegetables. Have an apple or some carrot sticks as an afternoon snack instead of a candy bar.  Try to have fruits and/or vegetables at every meal.  · Make exercise part of your daily routine. You may want to start with simple activities, such as walking, bicycling, or slow swimming. Try to be active 30 to 60 minutes every day. You do not need to do all 30 to 60 minutes all at once. For example, you can exercise 3 times a day for 10 or 20 minutes. Moderate exercise is safe for most people, but it is always a good idea to talk to your doctor before starting an exercise program.  · Keep moving. Liu  the lawn, work in the garden, or Drive.SG. Take the stairs instead of the elevator at work. · If you smoke, quit. People who smoke have an increased risk for heart attack, stroke, cancer, and other lung illnesses. Quitting is hard, but there are ways to boost your chance of quitting tobacco for good. ? Use nicotine gum, patches, or lozenges. ? Ask your doctor about stop-smoking programs and medicines. ? Keep trying. In addition to reducing your risk of diseases in the future, you will notice some benefits soon after you stop using tobacco. If you have shortness of breath or asthma symptoms, they will likely get better within a few weeks after you quit. · Limit how much alcohol you drink. Moderate amounts of alcohol (up to 2 drinks a day for men, 1 drink a day for women) are okay. But drinking too much can lead to liver problems, high blood pressure, and other health problems. Family health  If you have a family, there are many things you can do together to improve your health. · Eat meals together as a family as often as possible. · Eat healthy foods. This includes fruits, vegetables, lean meats and dairy, and whole grains. · Include your family in your fitness plan. Most people think of activities such as jogging or tennis as the way to fitness, but there are many ways you and your family can be more active. Anything that makes you breathe hard and gets your heart pumping is exercise.  Here are some tips:  ? Walk to do errands or to take your child to school or the bus.  ? Go for a family bike ride after dinner instead of watching TV. Where can you learn more? Go to http://efrain-virgen.info/. Enter Y863 in the search box to learn more about \"A Healthy Lifestyle: Care Instructions. \"  Current as of: September 11, 2018  Content Version: 12.1  © 8246-2445 [employer]. Care instructions adapted under license by [employer] (which disclaims liability or warranty for this information). If you have questions about a medical condition or this instruction, always ask your healthcare professional. [place] [address] any warranty or liability for your use of this information.

## 2019-08-23 ENCOUNTER — TELEPHONE (OUTPATIENT)
Dept: INTERNAL MEDICINE CLINIC | Age: 59
End: 2019-08-23

## 2019-08-23 NOTE — TELEPHONE ENCOUNTER
[facility] [employer]   Phone Number: [phone]             Pt requesting callback concerning medication.        Message received & copied from [facility]

## 2019-08-23 NOTE — TELEPHONE ENCOUNTER
Spoke for patient. Two pt identifiers confirmed. Patient states that she is calling to let us know that we will be getting a refill request from her pharmacy for her synthroid. Patient states that she needs a 90 day supply with refills so that she does not have to continue to call every time that she needs a refill. Advised patient that as soon as we receive the fax, I will have Dr. Nikky Schneider approve and send back to her pharmacy. Pt verbalized understanding of information discussed w/ no further questions at this time.

## 2019-08-26 DIAGNOSIS — E03.9 ACQUIRED HYPOTHYROIDISM: ICD-10-CM

## 2019-08-26 RX ORDER — LEVOTHYROXINE SODIUM 50 UG/1
TABLET ORAL
Qty: 90 TAB | Refills: 1 | Status: SHIPPED | OUTPATIENT
Start: 2019-08-26 | End: 2020-01-09

## 2019-10-22 ENCOUNTER — HOSPITAL ENCOUNTER (OUTPATIENT)
Dept: CT IMAGING | Age: 59
Discharge: HOME | End: 2019-10-22
Attending: RADIOLOGY
Payer: COMMERCIAL

## 2019-10-22 DIAGNOSIS — H93.A2 PULSATILE TINNITUS OF LEFT EAR: ICD-10-CM

## 2019-10-22 PROCEDURE — 70498 CT ANGIOGRAPHY NECK: CPT

## 2019-10-22 PROCEDURE — 74011636320 HC RX REV CODE- 636/320: Performed by: RADIOLOGY

## 2019-10-22 RX ORDER — SODIUM CHLORIDE 0.9 % (FLUSH) 0.9 %
10 SYRINGE (ML) INJECTION
Status: COMPLETED | OUTPATIENT
Start: 2019-10-22 | End: 2019-10-22

## 2019-10-22 RX ADMIN — Medication 10 ML: at 17:56

## 2019-10-22 RX ADMIN — IOPAMIDOL 100 ML: 755 INJECTION, SOLUTION INTRAVENOUS at 17:56

## 2019-11-05 ENCOUNTER — TELEPHONE (OUTPATIENT)
Dept: NEUROSURGERY | Age: 59
End: 2019-11-05

## 2019-11-07 DIAGNOSIS — H93.A2 PULSATILE TINNITUS OF LEFT EAR: Primary | ICD-10-CM

## 2019-11-08 ENCOUNTER — TELEPHONE (OUTPATIENT)
Dept: NEUROSURGERY | Age: 59
End: 2019-11-08

## 2019-11-08 NOTE — TELEPHONE ENCOUNTER
I called the patient to discuss the results of her recent CTA. I do not see any arterial abnormalities, anatomic variants, or venous stenosis that could be the source of her tinnitus. I asked if she had the MRI of the IACs performed, but she stated that Dr. Pooja Vasquez deferred that decision to me. I explained to her that the ringing nature of her tinnitus suggests that it is less likely from a vascular source. The MRI of the IACs was proposed due to the sensorineural hearing loss detected by Dr. Pooja Vasquez. It is not a study that I typically employ to evaluate for fistulas. I would however recommend an MRA of the brain. Patient expressed understanding. Order is in.

## 2019-12-04 ENCOUNTER — HOSPITAL ENCOUNTER (OUTPATIENT)
Dept: ULTRASOUND IMAGING | Age: 59
Discharge: HOME | End: 2019-12-04
Attending: OBSTETRICS & GYNECOLOGY
Payer: COMMERCIAL

## 2019-12-04 DIAGNOSIS — R31.21 ASYMPTOMATIC MICROSCOPIC HEMATURIA: ICD-10-CM

## 2019-12-04 PROCEDURE — 76770 US EXAM ABDO BACK WALL COMP: CPT

## 2019-12-27 ENCOUNTER — OFFICE VISIT (OUTPATIENT)
Dept: PRIMARY CARE CLINIC | Age: 59
End: 2019-12-27

## 2019-12-27 VITALS
HEIGHT: 62 IN | BODY MASS INDEX: 19.65 KG/M2 | HEART RATE: 61 BPM | RESPIRATION RATE: 16 BRPM | DIASTOLIC BLOOD PRESSURE: 54 MMHG | TEMPERATURE: 97.7 F | SYSTOLIC BLOOD PRESSURE: 105 MMHG | WEIGHT: 106.8 LBS | OXYGEN SATURATION: 98 %

## 2019-12-27 DIAGNOSIS — J06.9 VIRAL URI WITH COUGH: Primary | ICD-10-CM

## 2019-12-27 DIAGNOSIS — J02.9 SORE THROAT: ICD-10-CM

## 2019-12-27 LAB
S PYO AG THROAT QL: NEGATIVE
VALID INTERNAL CONTROL?: YES

## 2019-12-27 RX ORDER — TRIAMCINOLONE ACETONIDE 1 MG/G
CREAM TOPICAL
COMMUNITY
Start: 2019-12-11 | End: 2021-01-09

## 2019-12-27 RX ORDER — ESTRADIOL 0.1 MG/G
CREAM VAGINAL
COMMUNITY
End: 2021-01-09

## 2019-12-27 RX ORDER — NYSTATIN AND TRIAMCINOLONE ACETONIDE 100000; 1 [USP'U]/G; MG/G
OINTMENT TOPICAL
COMMUNITY
End: 2021-01-09

## 2019-12-27 RX ORDER — LEVOTHYROXINE SODIUM 75 UG/1
TABLET ORAL
COMMUNITY
End: 2019-12-27 | Stop reason: SDUPTHER

## 2019-12-27 RX ORDER — NITROFURANTOIN 25; 75 MG/1; MG/1
CAPSULE ORAL
COMMUNITY
End: 2020-07-08 | Stop reason: ALTCHOICE

## 2019-12-27 NOTE — PROGRESS NOTES
Osvaldo Cota is a 62 y.o. 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 Visit Vitals  /54 (BP 1 Location: Left arm, BP Patient Position: Sitting)   Pulse 61   Temp 97.7 °F (36.5 °C) (Oral)   Resp 16   Ht 5' 2\" (1.575 m)   Wt 106 lb 12.8 oz (48.4 kg)   SpO2 98%   BMI 19.53 kg/m²       Pain Scale: 0 - No pain/10    1. Have you been to the ER, urgent care clinic since your last visit? Hospitalized since your last visit? No    2. Have you seen or consulted any other health care providers outside of the [address] [place] [address] since your last visit? Include any pap smears or colon screening.  No

## 2019-12-27 NOTE — PATIENT INSTRUCTIONS
Sore Throat: Care Instructions  Your Care Instructions    Infection by bacteria or a virus causes most sore throats. Cigarette smoke, dry air, air pollution, allergies, and yelling can also cause a sore throat. Sore throats can be painful and annoying. Fortunately, most sore throats go away on their own. If you have a bacterial infection, your doctor may prescribe antibiotics. Follow-up care is a key part of your treatment and safety. Be sure to make and go to all appointments, and call your doctor if you are having problems. It's also a good idea to know your test results and keep a list of the medicines you take. How can you care for yourself at home? · If your doctor prescribed antibiotics, take them as directed. Do not stop taking them just because you feel better. You need to take the full course of antibiotics. · Gargle with warm salt water once an hour to help reduce swelling and relieve discomfort. Use 1 teaspoon of salt mixed in 1 cup of warm water. · Take an over-the-counter pain medicine, such as acetaminophen (Tylenol), ibuprofen (Advil, Motrin), or naproxen (Aleve). Read and follow all instructions on the label. · Be careful when taking over-the-counter cold or flu medicines and Tylenol at the same time. Many of these medicines have acetaminophen, which is Tylenol. Read the labels to make sure that you are not taking more than the recommended dose. Too much acetaminophen (Tylenol) can be harmful. · Drink plenty of fluids. Fluids may help soothe an irritated throat. Hot fluids, such as tea or soup, may help decrease throat pain. · Use over-the-counter throat lozenges to soothe pain. Regular cough drops or hard candy may also help. These should not be given to young children because of the risk of choking. · Do not smoke or allow others to smoke around you. If you need help quitting, talk to your doctor about stop-smoking programs and medicines.  These can increase your chances of quitting for good. · Use a vaporizer or humidifier to add moisture to your bedroom. Follow the directions for cleaning the machine. When should you call for help? Call your doctor now or seek immediate medical care if:    · You have new or worse trouble swallowing.     · Your sore throat gets much worse on one side.    Watch closely for changes in your health, and be sure to contact your doctor if you do not get better as expected. Where can you learn more? Go to http://efrain-virgen.info/. Enter [phone] in the search box to learn more about \"Sore Throat: Care Instructions. \"  Current as of: October 21, 2018  Content Version: 12.2  © 5650-5519 [employer]. Care instructions adapted under license by [employer] (which disclaims liability or warranty for this information). If you have questions about a medical condition or this instruction, always ask your healthcare professional. [address] any warranty or liability for your use of this information.

## 2019-12-27 NOTE — PROGRESS NOTES
Subjective:   Sarah Thomas is a 62 y.o. female who complains of sore throat and swollen glands for 3 days. She denies a history of shortness of breath and wheezing. Patient does not smoke cigarettes. Relevant PMH: No pertinent additional PMH. Objective:      Visit Vitals  /54 (BP 1 Location: Left arm, BP Patient Position: Sitting)   Pulse 61   Temp 97.7 °F (36.5 °C) (Oral)   Resp 16   Ht 5' 2\" (1.575 m)   Wt 106 lb 12.8 oz (48.4 kg)   SpO2 98%   BMI 19.53 kg/m²      Appears in mild to moderate distress. Ears: bilateral TM's and external ear canals normal  Oropharynx: erythematous and exudate noted  Neck: bilateral symmetric anterior adenopathy  Lungs: clear to auscultation, no wheezes, rales or rhonchi, symmetric air entry  The abdomen is soft without tenderness or hepatosplenomegaly. Rapid Strep test is negative    Assessment/Plan:   viral pharyngitis  Per orders. Gargle, use acetaminophen or other OTC analgesic, and take Rx fully as prescribed. Call if other family members develop similar symptoms. See prn. ICD-10-CM ICD-9-CM    1. Viral URI with cough J06.9 465.9     B97.89     2. Sore throat J02.9 462 AMB POC RAPID STREP A   .

## 2020-02-11 ENCOUNTER — TELEPHONE (OUTPATIENT)
Dept: NEUROSURGERY | Age: 60
End: 2020-02-11

## 2020-02-11 NOTE — TELEPHONE ENCOUNTER
Received a call from patient requesting to talk to provider.  Patient has more questions before making a decision about a diagnostic angiogram.

## 2020-02-28 ENCOUNTER — HOSPITAL ENCOUNTER (OUTPATIENT)
Dept: MRI IMAGING | Age: 60
Discharge: HOME | End: 2020-02-28
Payer: COMMERCIAL

## 2020-02-28 DIAGNOSIS — H93.A2 PULSATILE TINNITUS OF LEFT EAR: ICD-10-CM

## 2020-02-28 PROCEDURE — 70544 MR ANGIOGRAPHY HEAD W/O DYE: CPT

## 2020-03-10 ENCOUNTER — TELEPHONE (OUTPATIENT)
Dept: INTERNAL MEDICINE CLINIC | Age: 60
End: 2020-03-10

## 2020-03-10 NOTE — TELEPHONE ENCOUNTER
Patient is having heart palpitations and also needs an annual physical. Please leave a detailed message or try bewwiin 7:45 to 8:15 or 8:45 and 9:15. 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 Thanks.

## 2020-03-11 NOTE — TELEPHONE ENCOUNTER
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     Aissatou Mckoy      Copy/paste envera

## 2020-03-12 ENCOUNTER — TELEPHONE (OUTPATIENT)
Dept: NEUROSURGERY | Age: 60
End: 2020-03-12

## 2020-03-12 NOTE — TELEPHONE ENCOUNTER
Called patient today. Left voicemail. Will await her call and attempt to call her back if we are unable to connect today.

## 2020-03-13 NOTE — TELEPHONE ENCOUNTER
I called the patient again today and left a message on her voicemail for her to call the office. I do not see any evidence of arteriovenous shunting on the MRA, however there is a 3.5 x 1.5 mm left ICA aneurysm.

## 2020-03-16 ENCOUNTER — TELEPHONE (OUTPATIENT)
Dept: NEUROSURGERY | Age: 60
End: 2020-03-16

## 2020-03-16 NOTE — TELEPHONE ENCOUNTER
I was finally able to speak with the patient today. We discussed the findings of the MRA as well as the natural history of cerebral aneurysms, rupture risk based on the literature, consequences of rupture, and the risks, benefits, and alternatives of a diagnostic cerebral angiogram. Patient would like to proceed with a diagnostic cerebral angiogram for further evaluation. I assured her that the aneurysm is low risk. Due to our COVID-19 safety measures, we are not currently scheduling elective cases. Patient expressed understanding. We will contact the patient once we are able to schedule the procedure.

## 2020-05-04 ENCOUNTER — TELEPHONE (OUTPATIENT)
Dept: NEUROSURGERY | Age: 60
End: 2020-05-04

## 2020-05-04 NOTE — TELEPHONE ENCOUNTER
Spoke to patient to confirm Diagnostic angiogram date of 5/20/2020. Informed patient COVID-19 testing is required prior to procedure. Location and hours of operation given to patient. Patient requested a call from provider as she has a couple more questions. Spoke to provider and informed him of request. Provider to call patient.

## 2020-05-14 ENCOUNTER — TELEPHONE (OUTPATIENT)
Dept: NEUROSURGERY | Age: 60
End: 2020-05-14

## 2020-05-14 DIAGNOSIS — H93.A2 PULSATILE TINNITUS OF LEFT EAR: Primary | ICD-10-CM

## 2020-05-15 ENCOUNTER — OFFICE VISIT (OUTPATIENT)
Dept: PRIMARY CARE CLINIC | Age: 60
End: 2020-05-15

## 2020-05-15 DIAGNOSIS — Z20.822 EXPOSURE TO COVID-19 VIRUS: Primary | ICD-10-CM

## 2020-05-15 NOTE — PATIENT INSTRUCTIONS
9 Things To Do If You've Been Exposed to COVID-19    Stay home. If you've been exposed, you should stay in isolation for 14 days. Don't go to school, work, or public areas. And don't use public transportation. Leave your home only if you need to get medical care. But call the doctor's office first so they know you're coming, and wear a cloth face cover when you go. Call your doctor. Call your doctor or other health professional to let them know that you've been exposed. They might want you to be tested, or they may have other instructions for you. If you become sick, wear a face cover when you are around other people. It can help stop the spread of the virus when you cough or sneeze. Limit contact with people in your home. If possible, stay in a separate bedroom and use a separate bathroom. Avoid contact with pets and other animals. Cover your mouth and nose with a tissue when you cough or sneeze. Then throw it in the trash right away. Wash your hands often, especially after you cough or sneeze. Use soap and water, and scrub for at least 20 seconds. If soap and water aren't available, use an alcohol-based hand . Don't share personal household items. These include bedding, towels, cups and glasses, and eating utensils. Clean and disinfect your home every day. Use household  or disinfectant wipes or sprays. Take special care to clean things that you grab with your hands. These include doorknobs, remote controls, phones, and handles on your refrigerator and microwave. And don't forget countertops, tabletops, bathrooms, and computer keyboards. Current as of: April 15, 2020               Content Version: 12.4  © 4375-9511 [employer].    Care instructions adapted under license by your healthcare professional. If you have questions about a medical condition or this instruction, always ask your healthcare professional. Cedric Mccrary disclaims any warranty or liability for your use of this information.

## 2020-05-15 NOTE — PROGRESS NOTES
[facility]  [address] [place], [address]  [phone]             Date of visit: 5/15/2020   Subjective:      History obtained from:  the patient. Boni Fam is a 56 y.o. female who presents today for preop covid testing    No symptoms  Seen in our outdoor flu clinic during COVID-19 pandemic    Having angiogram on Wed with Dr. Arjun Sullivan    See scanned paper form    Assessment/Plan:       ICD-10-CM ICD-9-CM    1. Exposure to COVID-19 virus Z20.828 V01.79 NOVEL CORONAVIRUS (COVID-19)        Orders Placed This Encounter    NOVEL CORONAVIRUS (COVID-19)       Tested, will call with results    Discussed the diagnosis and plan and she expressed understanding. Follow-up and Dispositions    · Return if symptoms worsen or fail to improve.          Sam Gill MD

## 2020-05-18 LAB — SARS-COV-2, NAA: NOT DETECTED

## 2020-05-19 ENCOUNTER — TELEPHONE (OUTPATIENT)
Dept: NEUROSURGERY | Age: 60
End: 2020-05-19

## 2020-05-19 NOTE — TELEPHONE ENCOUNTER
Spoke to patient regarding procedure tomorrow. Per provider informed patient to take 650 mg aspirin tonight. No eating or drinking after midnight, take morning medications with sips of water. Arrival time 8:00am.   Patient stated her grandmother had a malignant hypothermia to anesthesia. Patient stated she developed a rash on her limbs after receiving contrast. Asked patient if she had notified anyone of this allergy before. Patient stated no. Spoke to NP regarding these reactions, stated no reason for intervention.

## 2020-05-20 ENCOUNTER — HOSPITAL ENCOUNTER (OUTPATIENT)
Dept: INTERVENTIONAL RADIOLOGY/VASCULAR | Age: 60
Discharge: HOME | End: 2020-05-20
Attending: RADIOLOGY | Admitting: RADIOLOGY
Payer: COMMERCIAL

## 2020-05-20 VITALS
RESPIRATION RATE: 14 BRPM | DIASTOLIC BLOOD PRESSURE: 66 MMHG | WEIGHT: 106 LBS | OXYGEN SATURATION: 100 % | BODY MASS INDEX: 19.51 KG/M2 | TEMPERATURE: 98.5 F | HEIGHT: 62 IN | HEART RATE: 63 BPM | SYSTOLIC BLOOD PRESSURE: 112 MMHG

## 2020-05-20 DIAGNOSIS — H93.A2 PULSATILE TINNITUS OF LEFT EAR: ICD-10-CM

## 2020-05-20 PROCEDURE — C1769 GUIDE WIRE: HCPCS

## 2020-05-20 PROCEDURE — 77030012468 HC VLV BLEEDBK CNTRL ABBT -B

## 2020-05-20 PROCEDURE — 77030008638 HC TU CONN COOK -A

## 2020-05-20 PROCEDURE — 74011636320 HC RX REV CODE- 636/320: Performed by: RADIOLOGY

## 2020-05-20 PROCEDURE — 74011250637 HC RX REV CODE- 250/637: Performed by: NURSE PRACTITIONER

## 2020-05-20 PROCEDURE — 74011250636 HC RX REV CODE- 250/636

## 2020-05-20 PROCEDURE — 74011250636 HC RX REV CODE- 250/636: Performed by: RADIOLOGY

## 2020-05-20 PROCEDURE — 36224 PLACE CATH CAROTD ART: CPT

## 2020-05-20 PROCEDURE — 77030016715 HC CATH ANGI DX TMPO2 CARD -B

## 2020-05-20 PROCEDURE — 74011000250 HC RX REV CODE- 250: Performed by: RADIOLOGY

## 2020-05-20 PROCEDURE — C1760 CLOSURE DEV, VASC: HCPCS

## 2020-05-20 PROCEDURE — 99153 MOD SED SAME PHYS/QHP EA: CPT

## 2020-05-20 RX ORDER — LIDOCAINE HYDROCHLORIDE 20 MG/ML
20 INJECTION, SOLUTION INFILTRATION; PERINEURAL ONCE
Status: COMPLETED | OUTPATIENT
Start: 2020-05-20 | End: 2020-05-20

## 2020-05-20 RX ORDER — HEPARIN SODIUM 1000 [USP'U]/ML
INJECTION, SOLUTION INTRAVENOUS; SUBCUTANEOUS
Status: DISCONTINUED
Start: 2020-05-20 | End: 2020-05-20 | Stop reason: WASHOUT

## 2020-05-20 RX ORDER — ONDANSETRON 2 MG/ML
4 INJECTION INTRAMUSCULAR; INTRAVENOUS
Status: COMPLETED | OUTPATIENT
Start: 2020-05-20 | End: 2020-05-20

## 2020-05-20 RX ORDER — ONDANSETRON 2 MG/ML
INJECTION INTRAMUSCULAR; INTRAVENOUS
Status: COMPLETED
Start: 2020-05-20 | End: 2020-05-20

## 2020-05-20 RX ORDER — ACETAMINOPHEN 325 MG/1
650 TABLET ORAL ONCE
Status: COMPLETED | OUTPATIENT
Start: 2020-05-20 | End: 2020-05-20

## 2020-05-20 RX ORDER — SODIUM CHLORIDE 9 MG/ML
25 INJECTION, SOLUTION INTRAVENOUS ONCE
Status: COMPLETED | OUTPATIENT
Start: 2020-05-20 | End: 2020-05-20

## 2020-05-20 RX ORDER — MIDAZOLAM HYDROCHLORIDE 1 MG/ML
5 INJECTION, SOLUTION INTRAMUSCULAR; INTRAVENOUS
Status: DISCONTINUED | OUTPATIENT
Start: 2020-05-20 | End: 2020-05-20

## 2020-05-20 RX ORDER — FENTANYL CITRATE 50 UG/ML
100 INJECTION, SOLUTION INTRAMUSCULAR; INTRAVENOUS
Status: DISCONTINUED | OUTPATIENT
Start: 2020-05-20 | End: 2020-05-20

## 2020-05-20 RX ORDER — GUAIFENESIN 100 MG/5ML
650 LIQUID (ML) ORAL DAILY
COMMUNITY
End: 2020-06-02 | Stop reason: DRUGHIGH

## 2020-05-20 RX ADMIN — ONDANSETRON 4 MG: 2 INJECTION INTRAMUSCULAR; INTRAVENOUS at 10:00

## 2020-05-20 RX ADMIN — HEPARIN SODIUM 4000 UNITS: 1000 INJECTION INTRAVENOUS; SUBCUTANEOUS at 10:18

## 2020-05-20 RX ADMIN — FENTANYL CITRATE 25 MCG: 50 INJECTION INTRAMUSCULAR; INTRAVENOUS at 10:40

## 2020-05-20 RX ADMIN — FENTANYL CITRATE 25 MCG: 50 INJECTION INTRAMUSCULAR; INTRAVENOUS at 10:05

## 2020-05-20 RX ADMIN — MIDAZOLAM 0.5 MG: 1 INJECTION INTRAMUSCULAR; INTRAVENOUS at 09:56

## 2020-05-20 RX ADMIN — IOPAMIDOL 85 ML: 612 INJECTION, SOLUTION INTRAVENOUS at 11:06

## 2020-05-20 RX ADMIN — HEPARIN SODIUM 4000 UNITS: 1000 INJECTION INTRAVENOUS; SUBCUTANEOUS at 10:19

## 2020-05-20 RX ADMIN — SODIUM CHLORIDE 25 ML/HR: 900 INJECTION, SOLUTION INTRAVENOUS at 08:46

## 2020-05-20 RX ADMIN — LIDOCAINE HYDROCHLORIDE 10 ML: 20 INJECTION, SOLUTION INFILTRATION; PERINEURAL at 11:06

## 2020-05-20 RX ADMIN — HEPARIN SODIUM 4000 UNITS: 1000 INJECTION INTRAVENOUS; SUBCUTANEOUS at 10:17

## 2020-05-20 RX ADMIN — FENTANYL CITRATE 25 MCG: 50 INJECTION INTRAMUSCULAR; INTRAVENOUS at 10:01

## 2020-05-20 RX ADMIN — ACETAMINOPHEN 650 MG: 325 TABLET ORAL at 12:44

## 2020-05-20 RX ADMIN — MIDAZOLAM 0.5 MG: 1 INJECTION INTRAMUSCULAR; INTRAVENOUS at 09:59

## 2020-05-20 NOTE — BRIEF OP NOTE
NEUROINTERVENTIONAL SURGERY POST-PROCEDURE NOTE    PROCEDURE:  Cerebral angiogram    VESSEL(S) STUDIED:  1. Right CCA  2. Right vertebral artery  3. Left CCA  4. Left ICA  5. Left vertebral artery VESSEL(S) TREATED:  1. N/A        PRELIMINARY REPORT & DISPOSITION:   4 x 2.3 mm irregular, wide-neck aneurysm with tiny bleb at its apex adjacent to left PCOM. No evidence of early arteriovenous shunting or venous sinus stenosis. COMPLICATIONS:  None    FOLLOW-UP:  Plan for stent-assisted coil embolization of left ICA aneurysm DATE OF SERVICE:  5/20/2020 11:15 AM     ATTENDING SURGEON(S):  Roman Parsons MD      ANESTHESIA:   Conscious sedation    MEDICATIONS:   See nursing record    PUNCTURE SITE:  Right common femoral artery. Arteriotomy closed with [employer]. Flat with leg straight x 2 hours. Yenni Romo M.D.     Eleanor Lentz    , Department of Radiology  [employer] [facility]

## 2020-05-20 NOTE — PROGRESS NOTES
I have reviewed discharge instructions with the patient. The patient verbalized understanding. Pt discharged from IR via wheelchair in good condition. Pt released with spouse and good understanding of all discharge instructions for home care and follow up.

## 2020-05-20 NOTE — PROGRESS NOTES
[address] [facility]  Radiology Department  [phone]      Radiologist: Dr. Yung Hernandez MD      Date: 05/20/2020      Work Excuse/ Work Restriction / Return to Work        __Marisela Persaud was seen in the Radiology Department today. Please     excuse them as the physician has recommended additional rest for recovery. He/She may return to work/school on _5/21/2020 with no heavy lifting for the next 2 weeks.             _____Mariann Byers RN_________________________________________    Nurse signature or MD Signature

## 2020-05-20 NOTE — H&P
Neurointerventional Surgery  History and Physical       Patient: Coco Helms MRN: [id number]  SSN: [id number]    YOB: 1960  Age: 56 y.o. Sex: female      History of Present Illness:      Coco Helms is a 62 y.o. right-handed female with history of hypothyroidism and anxiety who was referred by Dr. Lidia Barrera at [facility] for left sided pulsatile tinnitus. Patient states that she has experienced ringing in both ears for 6-7 years, but it has been worse on the left. Over the past 8 months, she has experienced that the ringing sound is now pulsatile. She notices it more at night when it is quiet. Bending over does not make it worse, but performing a valsalva maneuver however does. Nothing makes it go away. Around the same time that the pulsatile nature of the ringing sound started 8 months ago, patient had a \"whiplash\" like event while turning her neck, which caused nausea, dizziness, and she \"couldn't use muscles. \" This occurred after a long hike the day before. She experienced headaches and pain in the right neck, back, and down her spine. She saw the chiropractor the next day, and the pain only became worse. She is not sure if the ringing sound in her ear is related or not. Evaluation by Dr. Lidia Barrera revealed bilateral sensorineural hearing loss. After her initial office visit with Dr. Shakila Toledo she under went a CTA head/neck. There were no arterial abnormalities, anatomic variants, or venous stenosis that could be the source of the tinnitus. At this time an MRA of the brain was ordered by Dr. Shakila Toledo. The MRA showed no evidence of arteriovenous shunting however it did reveal a 3.5 X 1.5 mm left ICA aneurysm. Plans were made to move forward with a diagnostic cerebral angiogram to further evaluate the left ICA aneurysm. The patient presents today for elective planned diagnostic cerebral angiogram. She reports that she has taken 650 mg of aspirin last evening.  She had COVID-19 testing which is negative. Her pre- op routine labs were completed on 3/14 and reviewed by myself , all within normal limits. Today she reports no recent illness. Specifically she denies headache, dizziness, numbness and tingling, weakness on one side or the other, gait imbalance, speech or vision changes. Patient Active Problem List   Diagnosis Code    Bronchitis J40    Gallbladder polyp K82.4    Non-intractable vomiting with nausea R11.2    Pulsatile tinnitus of left ear H93. A2     Current Facility-Administered Medications   Medication Dose Route Frequency Provider Last Rate Last Dose    fentaNYL citrate (PF) injection 100 mcg  100 mcg IntraVENous Rad Multiple Nusrat Ferrari MD   25 mcg at 05/20/20 1005    midazolam (VERSED) injection 5 mg  5 mg IntraVENous RAD PRN Nusrat Ferrari MD   0.5 mg at 05/20/20 6483    lidocaine (XYLOCAINE) 20 mg/mL (2 %) injection 400 mg  20 mL SubCUTAneous ONCE Nusrat Ferrari MD        iopamidoL (ISOVUE 300) 61 % contrast injection 100 mL  100 mL IntraCATHeter RAD ONCE Nusrat Ferrari MD        iopamidoL (ISOVUE 300) 61 % contrast injection 100 mL  100 mL IntraCATHeter RAD ONCE Nusrat Ferrari MD        heparin 4000 units/1000 mL (4 units/mL) in NS infusion **FOR ANGIO USE ONLY**   IntraVENous RAD PRN Nusrat Ferrari MD        iopamidoL (ISOVUE 300) 61 % contrast injection 100 mL  100 mL IntraCATHeter RAD ONCE Nusrat Ferrari MD        heparin (porcine) 1,000 unit/mL injection              Allergies   Allergen Reactions    Acyclovir Nausea and Vomiting    Morphine Nausea and Vomiting    Pseudoephedrine Hcl Unknown (comments)     Past Medical History:   Diagnosis Date    Anxiety     Anxiety     Endocrine disease     hypothyroid    Gallbladder polyp     Neurological disorder     Thyroid disease      Past Surgical History:   Procedure Laterality Date    HX GYN      HX OVARIAN CYST REMOVAL      HX OVARIAN CYST REMOVAL Right      Family History   Problem Relation Age of Onset    Hypertension Mother     Dementia Father     No Known Problems Sister     No Known Problems Brother     No Known Problems Maternal Aunt     No Known Problems Maternal Uncle     No Known Problems Paternal Aunt     No Known Problems Paternal Uncle     Diabetes Maternal Grandmother     Cancer Maternal Grandfather     Cancer Paternal Grandmother     No Known Problems Paternal Grandfather      Social History     Tobacco Use    Smoking status: Never Smoker    Smokeless tobacco: Never Used   Substance Use Topics    Alcohol use: Yes     Comment: rarealy        Review of Systems    A comprehensive ROS was performed and was negative except for as per HPI.     Objective:     Current Facility-Administered Medications   Medication Dose Route Frequency Provider Last Rate Last Dose    fentaNYL citrate (PF) injection 100 mcg  100 mcg IntraVENous Rad Multiple Roc Sullivan MD   25 mcg at 05/20/20 1005    midazolam (VERSED) injection 5 mg  5 mg IntraVENous RAD PRN Roc Sullivan MD   0.5 mg at 05/20/20 0959    lidocaine (XYLOCAINE) 20 mg/mL (2 %) injection 400 mg  20 mL SubCUTAneous ONCE Roc Sullivan MD        iopamidoL (ISOVUE 300) 61 % contrast injection 100 mL  100 mL IntraCATHeter RAD ONCE Roc Sullivan MD        iopamidoL (ISOVUE 300) 61 % contrast injection 100 mL  100 mL IntraCATHeter RAD ONCE Roc Sullivan MD        heparin 4000 units/1000 mL (4 units/mL) in NS infusion **FOR ANGIO USE ONLY**   IntraVENous RAD PRN Roc Sullivan MD        iopamidoL (ISOVUE 300) 61 % contrast injection 100 mL  100 mL IntraCATHeter RAD ONCE Roc Sullivan MD        heparin (porcine) 1,000 unit/mL injection                 Visit Vitals  /67 (BP 1 Location: Right arm, BP Patient Position: Supine)   Pulse 74   Temp 98.5 °F (36.9 °C)   Resp 19   Ht 5' 2\" (1.575 m)   Wt 106 lb (48.1 kg)   SpO2 100%   BMI 19.39 kg/m²       Allergies   Allergen Reactions    Acyclovir Nausea and Vomiting    Morphine Nausea and Vomiting    Pseudoephedrine Hcl Unknown (comments)       Current Facility-Administered Medications   Medication Dose Route Frequency    fentaNYL citrate (PF) injection 100 mcg  100 mcg IntraVENous Rad Multiple    midazolam (VERSED) injection 5 mg  5 mg IntraVENous RAD PRN    lidocaine (XYLOCAINE) 20 mg/mL (2 %) injection 400 mg  20 mL SubCUTAneous ONCE    iopamidoL (ISOVUE 300) 61 % contrast injection 100 mL  100 mL IntraCATHeter RAD ONCE    iopamidoL (ISOVUE 300) 61 % contrast injection 100 mL  100 mL IntraCATHeter RAD ONCE    heparin 4000 units/1000 mL (4 units/mL) in NS infusion **FOR ANGIO USE ONLY**   IntraVENous RAD PRN    iopamidoL (ISOVUE 300) 61 % contrast injection 100 mL  100 mL IntraCATHeter RAD ONCE    heparin (porcine) 1,000 unit/mL injection            Physical Exam:  General: NAD  Lungs: clear to auscultation bilaterally  Heart: regular rate and rhythm, S1, S2 normal, no murmur, click, rub or gallop    Neurologic Exam:  Mental Status:  Alert and oriented x 4. Appropriate affect, mood and behavior. Language:    Normal fluency, repetition, comprehension and naming. Cranial Nerves:   Pupils equal, round and reactive to light. Visual fields full to confrontation. Extraocular movements intact. Facial sensation intact V1 - V3. Full facial strength, no asymmetry. Hearing intact bilaterally. No dysarthria. Tongue protrudes to midline, palate elevates symmetrically. Shoulder shrug 5/5 bilaterally. Motor:    No pronator drift. Bulk and tone normal.      5/5 power in all extremities proximally and distally. No involuntary movements. Sensation:    Sensation intact throughout to light touch. Coordination & Gait: Normal,FTN and HTS intact.         Labs:  Lab Results   Component Value Date/Time    Sodium 140 03/14/2019 04:21 PM    Potassium 5.0 03/14/2019 04:21 PM    Chloride 103 03/14/2019 04:21 PM CO2 26 03/14/2019 04:21 PM    Anion gap 7 03/11/2015 06:05 AM    Glucose 93 03/14/2019 04:21 PM    BUN 17 03/14/2019 04:21 PM    Creatinine 0.76 03/14/2019 04:21 PM    BUN/Creatinine ratio 22 03/14/2019 04:21 PM    GFR est AA 99 03/14/2019 04:21 PM    GFR est non-AA 86 03/14/2019 04:21 PM    Calcium 9.9 03/14/2019 04:21 PM     Lab Results   Component Value Date/Time    WBC 5.5 03/14/2019 04:21 PM    HGB 12.7 03/14/2019 04:21 PM    HCT 39.7 03/14/2019 04:21 PM    PLATELET 493 65/37/0080 04:21 PM    MCV 93 03/14/2019 04:21 PM     Lab Results   Component Value Date/Time    MCH 29.7 03/14/2019 04:21 PM    MCHC 32.0 03/14/2019 04:21 PM    BASOPHILS 1 03/14/2019 04:21 PM    ABS. LYMPHOCYTES 2.0 03/11/2015 05:33 AM    ABS. MONOCYTES 0.3 03/14/2019 04:21 PM    ABS. EOSINOPHILS 0.1 03/14/2019 04:21 PM    ABS. BASOPHILS 0.0 03/14/2019 04:21 PM       IMAGING:    All imaging prior to today was reviewed by Dr. Staci Tinsley   No results found. Assessment/Plan:     Ms. Analy Kaba is a 56year old woman who presents today for diagnostic elective angiogram to further characterize her left ICA aneurysm. We will move forward with this scheduled procedure today. This procedure has been fully reviewed with the patient and written informed consent has been obtained. - best medical therapy for stroke prevention utilized and should be continued for life     - signs and sx of stroke discussed and the importance of the activation of EMS, patient verbalized understanding     - patient given opportunity to review images and ask questions , with information provided.             Zander Rodriguez NP

## 2020-05-20 NOTE — PROGRESS NOTES
[address] [facility]  Radiology Department  [phone]      Radiologist: Dr. Deep Perez MD      Date: 05/20/2020      Work Excuse/ Work Restriction / Return to Work        __Marisela Gonzalez was seen in the Radiology Department today. Please     excuse them as the physician has recommended additional rest for recovery. He/She may return to work/school on _5/21/2020 with no heavy lifting for the next 2 weeks.             _____Mariann Thao RN_________________________________________    Nurse signature or MD Signature

## 2020-05-20 NOTE — DISCHARGE INSTRUCTIONS
[facility]  Interventional Radiology/Special Procedures    Cerebral Angiogam Discharge Instructions      A friend or family member must remain with you for the next 24 hours. Increase your fluid intake for the next 24 hours. You may return to work in 24-48 hours, depending on your type of work. Resume your previous diet and follow your medication reconciliation list.    Do not drive a vehicle or sign legal documents for the next 24 hours. Some bleeding could occur from the puncture site during the next 48 hours. Limit your walking. Do not engage in any vigorous physical activity for at least 48 hours. If you see any bleeding from the puncture site, apply pressure to the area immediately and ask for assistance. Hold pressure for at least 15 minutes. If the bleeding does not stop or the puncture site becomes swollen, have someone take you to the nearest medical facility immediately. CONTINUE TO HOLD PRESSURE TO THE PUNCTURE SITE. Call 911 if bleeding cannot be stopped. If you have minor leg discomfort, you may take a non-aspirin medication. However if you have SEVERE leg pain, numbness, tingling or change in the color of your leg, call the hospital or your doctor immediately. Leave the dressing in place for the next 3 days. After 3 days, you may remove the dressing and wash the area normally. NO baths, swimming or hot tubs while the dressing remains in place. Showering is permissible. Be sure to follow up with your doctor as soon as possible. Call and make an appointment if you do not already have one.

## 2020-05-20 NOTE — ROUTINE PROCESS
Pt arrives ambulatory to angio department accompanied by yue Suazo  for cerebral diagnostic angiogram procedure. All assessments completed and consent was reviewed. Education given was regarding procedure, conscious sedation, post-procedure care and  management/follow-up. Opportunity for questions was provided and all questions and concerns were addressed.

## 2020-05-26 ENCOUNTER — TELEPHONE (OUTPATIENT)
Dept: NEUROSURGERY | Age: 60
End: 2020-05-26

## 2020-05-26 NOTE — TELEPHONE ENCOUNTER
Spoke to patient to check on condition since angiogram. Patient reports right groin puncture site is without bruising, drainage and pain. Denies pain in right leg. Reports since her angiogram, she has been experiencing numbness and tingling in her upper right inner thigh 2-3 times daily. Stated it lasts for a couple of seconds. Informed patient I would notify provider. If he had any concerns, I would return the call. Patient stated understanding.

## 2020-06-02 ENCOUNTER — VIRTUAL VISIT (OUTPATIENT)
Dept: NEUROSURGERY | Age: 60
End: 2020-06-02

## 2020-06-02 ENCOUNTER — TELEPHONE (OUTPATIENT)
Dept: NEUROSURGERY | Age: 60
End: 2020-06-02

## 2020-06-02 DIAGNOSIS — I67.1 CEREBRAL ANEURYSM: ICD-10-CM

## 2020-06-02 DIAGNOSIS — H93.A2 PULSATILE TINNITUS OF LEFT EAR: Primary | ICD-10-CM

## 2020-06-02 RX ORDER — ASPIRIN 325 MG
325 TABLET ORAL DAILY
Qty: 30 TAB | Refills: 3 | Status: SHIPPED | OUTPATIENT
Start: 2020-06-02 | End: 2021-11-09 | Stop reason: ALTCHOICE

## 2020-06-02 RX ORDER — CLOPIDOGREL BISULFATE 75 MG/1
75 TABLET ORAL DAILY
Qty: 30 TAB | Refills: 3 | Status: SHIPPED | OUTPATIENT
Start: 2020-06-02 | End: 2020-09-23 | Stop reason: SDUPTHER

## 2020-06-02 NOTE — PROGRESS NOTES
[facility] Medicine Services  PROGRESS NOTE    Patient Name: Georgia Velázquez  : 1945  MRN: [id number]    Date of Admission: 2017  Length of Stay: 1  Primary Care Physician: Chaz Rico, DNP, APRN    Subjective   Subjective     CC:  F/u back pain    HPI:  Feels well this morning, had a large BM yesterday after laxatives. Still has increased frequency, however denies dysuria.    Review of Systems  Gen- No fevers, chills  CV- No chest pain, palpitations  Resp- No cough, dyspnea  GI- No N/V/D, abd pain    Otherwise ROS is negative except as mentioned in the HPI.    Objective   Objective     Vital Signs:   Temp:  [97.6 °F (36.4 °C)-98.2 °F (36.8 °C)] 98.2 °F (36.8 °C)  Heart Rate:  [64-82] 69  Resp:  [18-20] 20  BP: ()/(49-96) 135/77        Physical Exam:  Constitutional: No acute distress, awake, alert, obese  female  Eyes: PERRL, sclerae anicteric, no conjunctival injection  HENT: NCAT, mucous membranes moist  Neck: Supple, no thyromegaly, no lymphadenopathy, trachea midline  Respiratory: Clear to auscultation bilaterally, nonlabored respirations   Cardiovascular: RRR, no murmurs, rubs, or gallops, palpable pedal pulses bilaterally  Gastrointestinal: Positive bowel sounds, soft, nontender, nondistended, some mild RLQ tenderness with deep palpation.  Musculoskeletal: No bilateral ankle edema, no clubbing or cyanosis to extremities  Psychiatric: Appropriate affect, cooperative  Neurologic: Oriented x 3, strength symmetric in all extremities, Cranial Nerves grossly intact to confrontation, speech clear  Skin: No rashes    Results Reviewed:  I have personally reviewed current lab, radiology, and data and agree.      Results from last 7 days  Lab Units 17  0750 17  1936   WBC 10*3/mm3 10.09 11.45*   HEMOGLOBIN g/dL 11.7 11.6   HEMATOCRIT % 34.8 34.8   PLATELETS 10*3/mm3 225 217       Results from last 7 days  Lab Units 17  0657  Neurointerventional Surgery Clinic Note    Meenakshi Pederson is a 56 y.o. female who was seen by synchronous (real-time) audio-video technology on 6/2/2020. Consent: Meenakshi Pederson, who was seen by synchronous (real-time) audio-video technology, and/or her healthcare decision maker, is aware that this patient-initiated, Telehealth encounter on 6/2/2020 is a billable service, with coverage as determined by her insurance carrier. She is aware that she may receive a bill and has provided verbal consent to proceed: Yes. Assessment & Plan:     18-year-old female with left-sided pulsatile tinnitus and wide neck, irregular 4 x 2.3 mm left ICA posterior communicating artery aneurysm. Due to the irregularity of this aneurysm, I recommended treatment. We discussed the characteristics and natural history of cerebral aneurysms, including the probability of intracranial hemorrhage related to rupture of this aneurysm as estimated by available data from the International Study of Unruptured Intracranial Aneurysms (ISUIA) study. We relayed that a non-enlarging aneurysm of this size in this location would carry an approximately <1% risk of hemorrhage over five years. However, I explained that since this aneurysm is very irregular, the risk of rupture is likely higher. Also, if the aneurysm is enlarging, the risk of rupture may be even considerably higher. We also described the potential consequences of intracranial hemorrhage. We discussed various strategies available to manage cerebral aneurysms, including endovascular treatment, open surgical treatment, and imaging surveillance:  1.  Endovascular treatment: We discussed endovascular treatment in detail, including the use of general anesthesia, angiography, selective catheterization of intracranial vessels, endovascular embolization of the aneurysm, short-term hospitalization following the procedure, and treatment with antiplatelet medications before, during, and after 12/05/17  0750 12/04/17  1936   SODIUM mmol/L 136 132 129*   POTASSIUM mmol/L 4.3 4.3 4.5   CHLORIDE mmol/L 105 98* 95*   CO2 mmol/L 20.0 22.0 20.0   BUN mg/dL 40* 49* 62*   CREATININE mg/dL 1.70* 1.80* 2.40*   GLUCOSE mg/dL 117* 129* 213*   CALCIUM mg/dL 8.8 9.2 9.4   ALT (SGPT) U/L  --   --  58*   AST (SGOT) U/L  --   --  78*     No results found for: BNP  No results found for: PHART    Microbiology Results Abnormal     Procedure Component Value - Date/Time    Urine Culture - Urine, Urine, Clean Catch [[id number]]  (Abnormal)  (Susceptibility) Collected:  12/04/17 2009    Lab Status:  Final result Specimen:  Urine from Urine, Catheter Updated:  12/06/17 1142     Urine Culture --      >100,000 CFU/mL Klebsiella pneumoniae (A)    Susceptibility      Klebsiella pneumoniae     MIGUEL ANGEL     Ampicillin >16 ug/ml Resistant     Ampicillin + Sulbactam <=8/4 ug/ml Susceptible     Aztreonam <=8 ug/ml Susceptible     Cefepime <=8 ug/ml Susceptible     Cefotaxime <=2 ug/ml Susceptible     Ceftriaxone <=8 ug/ml Susceptible     Cefuroxime sodium <=4 ug/ml Susceptible     Cephalothin <=8 ug/ml Susceptible     Ertapenem <=1 ug/ml Susceptible     Gentamicin <=4 ug/ml Susceptible     Levofloxacin <=2 ug/ml Susceptible     Meropenem <=1 ug/ml Susceptible     Nitrofurantoin <=32 ug/ml Susceptible     Piperacillin + Tazobactam <=16 ug/ml Susceptible     Tetracycline <=4 ug/ml Susceptible     Tobramycin <=4 ug/ml Susceptible     Trimethoprim + Sulfamethoxazole <=2/38 ug/ml Susceptible                          Imaging Results (last 24 hours)     Procedure Component Value Units Date/Time    US Renal Limited [[id number]] Collected:  12/05/17 1343     Updated:  12/05/17 1434    Narrative:       EXAMINATION: US RENAL LIMITED- 12/05/2017     INDICATION: [place]; N30.00-Acute cystitis without hematuria; N17.9-Acute  kidney failure, unspecified; S32.040A-Wedge compression fracture of  fourth lumbar vertebra, initial encounter for closed  the procedure. We also specifically discussed endovascular treatment options suitable for this type of aneurysm, including stent-assisted coil embolization. We discussed the risks associated with endovascular treatment, which include access site complications, thromboembolic complications, stroke, intracranial hemorrhage, vascular injury, contrast-induced nephropathy, and death, along with the separate risks of antiplatelet medications. We also discussed the possibility that multiple endovascular treatments might be needed to treat the aneurysm. 2. Open surgical treatment: We discussed the general approach to open microsurgical treatment of aneurysms, including the use of general anesthesia, craniotomy, retraction of cerebral tissues, placement of a metallic clip across the aneurysm neck, intraoperative angiography, inpatient hospitalization lasting several days, and outpatient recovery lasting several months. We discussed that the advantages of open microsurgical treatment compared to endovascular treatment include increased durability of treatment, and that the disadvantages of open microsurgical treatment compared to endovascular treatment include increased perioperative complications and longer period of recovery. 3. Imaging surveillance: We discussed that the general goal of imaging surveillance is to detect a change in the size or morphology of the aneurysm that would imply a higher risk of aneurysm rupture, which in turn might prompt reconsideration of treatment. We discussed that the patient would be continually exposed to the ongoing risk of aneurysm rupture during the period of surveillance, even if the aneurysm were to remain unchanged. Based on these discussions, the patient wishes to proceed with endovascular treatment. The risks, benefits, and alternatives were discussed in detail. The patient expressed good understanding of the risks and anticipated benefits of this management strategy.  All fracture;  M51.9-Unspecified thoracic, thoracolumbar and lumbosacral intervertebral  disc disorder      TECHNIQUE: Ultrasound kidneys and urinary bladder     COMPARISON: NONE     FINDINGS: Right kidney measures 10.2 cm in length without evidence of  hydronephrosis, contour deforming mass or obvious calculi. Left kidney  measures 11.6 cm in length without evidence of hydronephrosis, contour  deforming mass or obvious calculi.     Visualized urinary bladder is partially decompressed and grossly  unremarkable.       Impression:       No sonographic evidence for abnormality within the  visualized kidneys or urinary bladder. No hydronephrosis.     D:  12/05/2017  E:  12/05/2017         This report was finalized on 12/5/2017 2:32 PM by Dr. Jamison Nunn.              I have reviewed the medications.    Assessment/Plan   Assessment / Plan     Hospital Problem List     * (Principal)Urinary tract infection    Essential hypertension    Type 2 diabetes mellitus    Hyperlipemia    Coronary artery disease    Osteoporosis    GERD (gastroesophageal reflux disease)    Acute cystitis without hematuria    Acute kidney injury    Constipation    Abdominal pain    Leukocytosis          Brief Hospital Course to date:  Georgia Velázquez is a 72 y.o. female admitted with increased urinary frequency, and low back pain.    Assessment & Plan:  1. Low back pain secondary to L4 compression fracture  - neurosurgery planning for kyphoplasty when medically cleared  - will check EKG prior to surgery     2. Urinary tract infection  - urine cx growing klebsiella susceptible to Rocephin, will continue for now     3. Acute kidney injury  - Cr. Continue sot improve with IVF 1.7 this morning, baseline around 1.3-1.4  - renal u/s negative, no hydronephrosis     4. Constipation, abdominal pain  -had large BM yesterday, continue bowel regimen     5. Hx of HTN  - continue home meds    6. Hx of T2DM  -FSBG AC/HS   -low dose SSI  -last Hgb A1C 5.7 on  questions were answered. We will proceed with stent assisted coil embolization of the left ICA aneurysm. Patient understands that she should seek emergent medical care if she experiences the worst headache of her life or new neurological symptoms. Plan:  -Initiate aspirin 325 mg daily and Plavix 75 mg daily  -Check aspirin test, P2 Y 12, CBC, and CMP in 1 week  -Schedule stent assisted coil embolization of left ICA aneurysm    I spent at least 30 minutes on this visit with this established patient. Subjective:   Lavern Bob is a 56 y.o. female evaluated for left-sided pulsatile tinnitus and found to have a left ICA aneurysm. Cerebral angiogram performed 5/20/2020 demonstrated an irregular, wide neck aneurysm arising from the left ICA, adjacent to the origin of the left posterior communicating artery and measuring 4 x 2.3 mm with a tiny bleb at its apex. There is no evidence of arteriovenous shunting or venous sinus stenosis to explain patient's pulsatile tinnitus. Patient presents today for discussion of aneurysm treatment. Patient states that she has mild headaches, \"nothing out of the ordinary. \"  The headaches are frontal in location and 1-2 out of 10 in severity. She said she is periodically a \"little foggy. \"  She denies thunderclap headaches. She states that she recovered well from the cerebral angiogram but has some mild numbness and tingling on the inside of her right thigh. She denies weakness, other numbness and tingling, nausea and vomiting, dizziness, changes in vision, and problems with balance and coordination. Patient denies tobacco use and family history of cerebral aneurysms. Prior to Admission medications    Medication Sig Start Date End Date Taking? Authorizing Provider   aspirin (ASPIRIN) 325 mg tablet Take 1 Tab by mouth daily. 6/2/20  Yes Uday Kim MD   clopidogreL (Plavix) 75 mg tab Take 1 Tab by mouth daily.  6/2/20  Yes Uday Kim MD 11/7/2017     7. Hx of HLD  -continue home meds     8. Hx of GERD  -continue home meds     9. Hx of CAD   -continue home meds     DVT Prophylaxis:  Heparin    CODE STATUS: Full Code    Disposition: I expect the patient to be discharged CYNTHIA Rosales DO  12/06/17  12:27 PM         levothyroxine (SYNTHROID) 50 mcg tablet TAKE 1 TABLET DAILY BEFORE BREAKFAST 1/9/20  Yes Appa Brady Pelayo MD   multivitamins-ca-iron-minerals (ONE DAILY WOMEN'S) 27-0.4 mg tab One Daily Women's 27 mg-0.4 mg tablet   Prescribed by non [address] MD 8/1/06  Yes Provider, Historical   estradiol (ESTRACE) 0.01 % (0.1 mg/gram) vaginal cream Estrace 0.01% (0.1 mg/gram) vaginal cream   Insert 1/2 g into vagina 2 x per week   Yes Provider, Historical   levothyroxine (SYNTHROID) 75 mcg tablet Take 1 Tab by mouth Every Mon, Wed & Sun. 10/21/18  Yes Anamika Posadas MD   nitrofurantoin, macrocrystal-monohydrate, (MACROBID) 100 mg capsule Macrobid 100 mg capsule   Take 1 capsule every 12 hours by oral route for 7 days. Provider, Historical   nystatin-triamcinolone (MYCOLOG) 100,000-0.1 unit/gram-% ointment nystatin-triamcinolone 100,000 unit/gram-0.1 % topical ointment   APPLY TO THE AFFECTED AREA(S) BY TOPICAL ROUTE 2 TIMES PER DAY as needed    Provider, Historical   prasterone, dhea, (INTRAROSA) 6.5 mg inst Intrarosa 6.5 mg vaginal insert   Insert 1 vaginal insert every day by vaginal route. Provider, Historical   triamcinolone acetonide (KENALOG) 0.1 % topical cream  12/11/19   Provider, Historical   guaifenesin (MUCINEX PO) Take  by mouth. Provider, Historical   MULTIVITAMIN PO Take  by mouth. Provider, Historical     Allergies   Allergen Reactions    Acyclovir Nausea and Vomiting    Morphine Nausea and Vomiting    Pseudoephedrine Hcl Unknown (comments)       Patient Active Problem List   Diagnosis Code    Bronchitis J40    Gallbladder polyp K82.4    Non-intractable vomiting with nausea R11.2    Pulsatile tinnitus of left ear H93. A2    Cerebral aneurysm I67.1     Patient Active Problem List    Diagnosis Date Noted    Cerebral aneurysm 06/02/2020    Pulsatile tinnitus of left ear 08/20/2019    Non-intractable vomiting with nausea 01/31/2018    Gallbladder polyp     Bronchitis 11/06/2015 Current Outpatient Medications   Medication Sig Dispense Refill    aspirin (ASPIRIN) 325 mg tablet Take 1 Tab by mouth daily. 30 Tab 3    clopidogreL (Plavix) 75 mg tab Take 1 Tab by mouth daily. 30 Tab 3    levothyroxine (SYNTHROID) 50 mcg tablet TAKE 1 TABLET DAILY BEFORE BREAKFAST 90 Tab 0    multivitamins-ca-iron-minerals (ONE DAILY WOMEN'S) 27-0.4 mg tab One Daily Women's 27 mg-0.4 mg tablet   Prescribed by non [facility] MD      estradiol (ESTRACE) 0.01 % (0.1 mg/gram) vaginal cream Estrace 0.01% (0.1 mg/gram) vaginal cream   Insert 1/2 g into vagina 2 x per week      levothyroxine (SYNTHROID) 75 mcg tablet Take 1 Tab by mouth Every Mon, Wed & Sun. 30 Tab 1    nitrofurantoin, macrocrystal-monohydrate, (MACROBID) 100 mg capsule Macrobid 100 mg capsule   Take 1 capsule every 12 hours by oral route for 7 days.  nystatin-triamcinolone (MYCOLOG) 100,000-0.1 unit/gram-% ointment nystatin-triamcinolone 100,000 unit/gram-0.1 % topical ointment   APPLY TO THE AFFECTED AREA(S) BY TOPICAL ROUTE 2 TIMES PER DAY as needed      prasterone, dhea, (INTRAROSA) 6.5 mg inst Intrarosa 6.5 mg vaginal insert   Insert 1 vaginal insert every day by vaginal route.  triamcinolone acetonide (KENALOG) 0.1 % topical cream       guaifenesin (MUCINEX PO) Take  by mouth.  MULTIVITAMIN PO Take  by mouth.        Allergies   Allergen Reactions    Acyclovir Nausea and Vomiting    Morphine Nausea and Vomiting    Pseudoephedrine Hcl Unknown (comments)     Past Medical History:   Diagnosis Date    Anxiety     Anxiety     Endocrine disease     hypothyroid    Gallbladder polyp     Neurological disorder     Thyroid disease      Past Surgical History:   Procedure Laterality Date    HX GYN      HX OVARIAN CYST REMOVAL      HX OVARIAN CYST REMOVAL Right      Family History   Problem Relation Age of Onset    Hypertension Mother     Dementia Father     No Known Problems Sister     No Known Problems Brother     No Known Problems Maternal Aunt     No Known Problems Maternal Uncle     No Known Problems Paternal Aunt     No Known Problems Paternal Uncle     Diabetes Maternal Grandmother     Cancer Maternal Grandfather     Cancer Paternal Grandmother     No Known Problems Paternal Grandfather      Social History     Tobacco Use    Smoking status: Never Smoker    Smokeless tobacco: Never Used   Substance Use Topics    Alcohol use: Yes     Comment: rarealy       ROS: Pertinent ROS included in HPI    Objective:   Vital Signs: (As obtained by patient/caregiver at home)  There were no vitals taken for this visit. Constitutional: [x] Appears well-developed and well-nourished [x] No apparent distress      [] Abnormal -     Mental status: [x] Alert and awake  [x] Oriented to person/place/time [x] Able to follow commands    [] Abnormal -     Eyes:   EOM    [x]  Normal    [] Abnormal -   Sclera  [x]  Normal    [] Abnormal -          Discharge [x]  None visible   [] Abnormal -     HENT: [x] Normocephalic, atraumatic  [] Abnormal -   [x] Mouth/Throat: Mucous membranes are moist    External Ears [x] Normal  [] Abnormal -    Neck: [x] No visualized mass [] Abnormal -     Pulmonary/Chest: [x] Respiratory effort normal   [x] No visualized signs of difficulty breathing or respiratory distress        [] Abnormal -      Musculoskeletal:   [x] Normal gait with no signs of ataxia         [x] Normal range of motion of neck        [] Abnormal -     Neurological:        [x] No Facial Asymmetry (Cranial nerve 7 motor function) (limited exam due to video visit)          [x] No gaze palsy        [] Abnormal -          Skin:        [x] No significant exanthematous lesions or discoloration noted on facial skin         [] Abnormal -            Psychiatric:       [x] Normal Affect [] Abnormal -        [x] No Hallucinations    Other pertinent observable physical exam findings:-    Neurologic Exam:  Mental Status:  Alert and oriented x 4.   Appropriate affect, mood and behavior. Language:    Normal fluency, repetition, comprehension and naming. Cranial Nerves:   Cannot assess pupillary reaction. Visual fields appear to be full to confrontation, but difficult to evaluate virtually. Extraocular movements intact. Facial sensation intact V1 - V3. Full facial strength, no asymmetry. Hearing intact bilaterally. No dysarthria. Tongue protrudes to midline, palate elevates symmetrically. Shoulder shrug 5/5 bilaterally. Motor:    No pronator drift. Bulk and tone appear to be normal.      Difficult to evaluate strength, but seems intact     No involuntary movements. Sensation:    Sensation grossly intact subjectively    Reflexes:    Deferred    Coordination & Gait: Gait normal. FTN intact. We discussed the expected course, resolution and complications of the diagnosis(es) in detail. Medication risks, benefits, costs, interactions, and alternatives were discussed as indicated. I advised her to contact the office if her condition worsens, changes or fails to improve as anticipated. She expressed understanding with the diagnosis(es) and plan. Robyn Davis is a 56 y.o. female who was evaluated by a video visit encounter for concerns as above. Patient identification was verified prior to start of the visit. A caregiver was present when appropriate. Due to this being a TeleHealth encounter (During [facility]- public health emergency), evaluation of the following organ systems was limited: Vitals/Constitutional/EENT/Resp/CV/GI//MS/Neuro/Skin/Heme-Lymph-Imm.   Pursuant to the emergency declaration under the [facility], [facility] waiver authority and the [employer] Act, this Virtual  Visit was conducted, with patient's (and/or legal guardian's) consent, to reduce the patient's risk of exposure to COVID-19 and provide necessary medical care.     Services were provided through a video synchronous discussion virtually to substitute for in-person clinic visit. Patient and provider were located at their individual homes. This visit was completed virtually using DoxyHelen Nicole MD

## 2020-06-02 NOTE — PROGRESS NOTES
Phone call to patient in preparation of virtual visit with provider. Name and  verified. Procedure follow up for Pulsatile Tinnitus in her left ear. Patient reports no bruising, redness, pain or drainage at puncture site of right groin. Reports numbness and tingling of upper right thigh. Reports occasional headaches, no change in the ringing in her left ear. No acute problems reported.

## 2020-06-02 NOTE — TELEPHONE ENCOUNTER
Spoke to patient to confirm procedure on 6/15/2020. Orders for plavix and aspirin escribed to [facility] pharmacy per patient request.  Patient stated she has 81 mg aspirin and will take 4 tabs daily to equil 324 mg. She already has 81 mg aspirin at home. Patient will have COVID testing and lab work done 6/9/2020. Patient stated understanding.

## 2020-06-05 ENCOUNTER — DOCUMENTATION ONLY (OUTPATIENT)
Dept: NEUROSURGERY | Age: 60
End: 2020-06-05

## 2020-06-05 NOTE — PROGRESS NOTES
Spoke with Ray banegas/ Viktor @10:45am 6/5/2020.  Prior authorization for procedure codes: [id number] [id number]  Pending authorization: [id number]  Faxed clinical to [phone]

## 2020-06-09 ENCOUNTER — HOSPITAL ENCOUNTER (OUTPATIENT)
Dept: LAB | Age: 60
Discharge: HOME | End: 2020-06-09

## 2020-06-09 ENCOUNTER — OFFICE VISIT (OUTPATIENT)
Dept: PRIMARY CARE CLINIC | Age: 60
End: 2020-06-09

## 2020-06-09 VITALS — HEART RATE: 73 BPM | OXYGEN SATURATION: 96 % | TEMPERATURE: 98.3 F

## 2020-06-09 DIAGNOSIS — H93.A2 PULSATILE TINNITUS OF LEFT EAR: ICD-10-CM

## 2020-06-09 DIAGNOSIS — Z01.818 PREOP TESTING: Primary | ICD-10-CM

## 2020-06-09 LAB
ASPIRIN TEST, ASPIRN: 518 ARU
P2Y12 PLT RESPONSE,PPPR: 68 PRU (ref 194–418)

## 2020-06-09 NOTE — PROGRESS NOTES
Patient is being seen at the [address] [place], [place] [address]y. 58. Please see scanned documentation as well for further information. S:  Ms. Jarrod Gotti presents for pre-op or pre-procedure testing for Covid 19. Patient has procedure for Cerebral aneurysm scheduled for 6/15/2020 at [facility]. Patient denies current Covid type symptoms. O:    Visit Vitals  Pulse 73   Temp 98.3 °F (36.8 °C)   SpO2 96%     Alert and oriented  No acute distress, no increased work of breathing  Normocephalic, atraumatic  Skin color normal  Calm and cooperative  Voice clear, conversant without shortness of breath    A/P:  Pre-op, Pre-procedure exam    Covid 19 testing performed  Patient understands she will be contacted if the results are positive. Follow up prn.

## 2020-06-10 LAB
ALBUMIN SERPL-MCNC: 4.7 G/DL (ref 3.8–4.8)
ALBUMIN/GLOB SERPL: 1.7 {RATIO} (ref 1.2–2.2)
ALP SERPL-CCNC: 78 IU/L (ref 39–117)
ALT SERPL-CCNC: 10 IU/L (ref 0–32)
AST SERPL-CCNC: 14 IU/L (ref 0–40)
BILIRUB SERPL-MCNC: 0.2 MG/DL (ref 0–1.2)
BUN SERPL-MCNC: 13 MG/DL (ref 8–27)
BUN/CREAT SERPL: 15 (ref 12–28)
CALCIUM SERPL-MCNC: 10.3 MG/DL (ref 8.7–10.3)
CHLORIDE SERPL-SCNC: 102 MMOL/L (ref 96–106)
CO2 SERPL-SCNC: 25 MMOL/L (ref 20–29)
CREAT SERPL-MCNC: 0.84 MG/DL (ref 0.57–1)
ERYTHROCYTE [DISTWIDTH] IN BLOOD BY AUTOMATED COUNT: 12.7 % (ref 11.7–15.4)
GLOBULIN SER CALC-MCNC: 2.8 G/DL (ref 1.5–4.5)
GLUCOSE SERPL-MCNC: 53 MG/DL (ref 65–99)
HCT VFR BLD AUTO: 41.3 % (ref 34–46.6)
HGB BLD-MCNC: 13.8 G/DL (ref 11.1–15.9)
MCH RBC QN AUTO: 30.8 PG (ref 26.6–33)
MCHC RBC AUTO-ENTMCNC: 33.4 G/DL (ref 31.5–35.7)
MCV RBC AUTO: 92 FL (ref 79–97)
PLATELET # BLD AUTO: 315 X10E3/UL (ref 150–450)
POTASSIUM SERPL-SCNC: 4.9 MMOL/L (ref 3.5–5.2)
PROT SERPL-MCNC: 7.5 G/DL (ref 6–8.5)
RBC # BLD AUTO: 4.48 X10E6/UL (ref 3.77–5.28)
SODIUM SERPL-SCNC: 142 MMOL/L (ref 134–144)
WBC # BLD AUTO: 4.9 X10E3/UL (ref 3.4–10.8)

## 2020-06-11 ENCOUNTER — TELEPHONE (OUTPATIENT)
Dept: NEUROSURGERY | Age: 60
End: 2020-06-11

## 2020-06-11 LAB — SARS-COV-2, NAA: NOT DETECTED

## 2020-06-11 NOTE — TELEPHONE ENCOUNTER
Message left for patient informing her per provider lab results are good. Continue current Aspirin and Plavix as ordered. COVID-19 test was negative. No eating or drinking after midnight on Sunday and take morning medications with sips of water. Call office with questions.

## 2020-06-12 ENCOUNTER — TELEPHONE (OUTPATIENT)
Dept: NEUROSURGERY | Age: 60
End: 2020-06-12

## 2020-06-12 NOTE — TELEPHONE ENCOUNTER
Spoke to patient to see if she received the message from yesterday with instructions for her procedure on 6/15/2020. Patient had received the message. Informed patient her arrival time is 7:00 am and patient's insurance approved treatment. Patient stated understanding.

## 2020-06-15 ENCOUNTER — ANESTHESIA EVENT (OUTPATIENT)
Dept: INTERVENTIONAL RADIOLOGY/VASCULAR | Age: 60
DRG: 027 | End: 2020-06-15
Payer: COMMERCIAL

## 2020-06-15 ENCOUNTER — HOSPITAL ENCOUNTER (INPATIENT)
Dept: INTERVENTIONAL RADIOLOGY/VASCULAR | Age: 60
LOS: 1 days | Discharge: HOME | DRG: 027 | End: 2020-06-16
Attending: RADIOLOGY | Admitting: RADIOLOGY
Payer: COMMERCIAL

## 2020-06-15 ENCOUNTER — ANESTHESIA (OUTPATIENT)
Dept: INTERVENTIONAL RADIOLOGY/VASCULAR | Age: 60
DRG: 027 | End: 2020-06-15
Payer: COMMERCIAL

## 2020-06-15 DIAGNOSIS — H93.A2 PULSATILE TINNITUS OF LEFT EAR: ICD-10-CM

## 2020-06-15 DIAGNOSIS — I67.1 CEREBRAL ANEURYSM: ICD-10-CM

## 2020-06-15 LAB
ACT BLD: 103 SECS (ref 79–138)
ACT BLD: 180 SECS (ref 79–138)
ACT BLD: 230 SECS (ref 79–138)
ALBUMIN SERPL-MCNC: 3.2 G/DL (ref 3.5–5)
ALBUMIN/GLOB SERPL: 1 {RATIO} (ref 1.1–2.2)
ALP SERPL-CCNC: 64 U/L (ref 45–117)
ALT SERPL-CCNC: 14 U/L (ref 12–78)
ANION GAP SERPL CALC-SCNC: 5 MMOL/L (ref 5–15)
ASPIRIN TEST, ASPIRN: 529 ARU
AST SERPL-CCNC: 10 U/L (ref 15–37)
BASOPHILS # BLD: 0 K/UL (ref 0–0.1)
BASOPHILS NFR BLD: 1 % (ref 0–1)
BILIRUB SERPL-MCNC: 0.2 MG/DL (ref 0.2–1)
BUN SERPL-MCNC: 13 MG/DL (ref 6–20)
BUN/CREAT SERPL: 21 (ref 12–20)
CALCIUM SERPL-MCNC: 8.3 MG/DL (ref 8.5–10.1)
CHLORIDE SERPL-SCNC: 109 MMOL/L (ref 97–108)
CO2 SERPL-SCNC: 23 MMOL/L (ref 21–32)
CREAT SERPL-MCNC: 0.61 MG/DL (ref 0.55–1.02)
DIFFERENTIAL METHOD BLD: ABNORMAL
EOSINOPHIL # BLD: 0 K/UL (ref 0–0.4)
EOSINOPHIL NFR BLD: 1 % (ref 0–7)
ERYTHROCYTE [DISTWIDTH] IN BLOOD BY AUTOMATED COUNT: 12.9 % (ref 11.5–14.5)
GLOBULIN SER CALC-MCNC: 3.2 G/DL (ref 2–4)
GLUCOSE SERPL-MCNC: 97 MG/DL (ref 65–100)
HCT VFR BLD AUTO: 35.5 % (ref 35–47)
HGB BLD-MCNC: 11.4 G/DL (ref 11.5–16)
IMM GRANULOCYTES # BLD AUTO: 0 K/UL (ref 0–0.04)
IMM GRANULOCYTES NFR BLD AUTO: 0 % (ref 0–0.5)
LYMPHOCYTES # BLD: 0.9 K/UL (ref 0.8–3.5)
LYMPHOCYTES NFR BLD: 25 % (ref 12–49)
MCH RBC QN AUTO: 30 PG (ref 26–34)
MCHC RBC AUTO-ENTMCNC: 32.1 G/DL (ref 30–36.5)
MCV RBC AUTO: 93.4 FL (ref 80–99)
MONOCYTES # BLD: 0.1 K/UL (ref 0–1)
MONOCYTES NFR BLD: 2 % (ref 5–13)
NEUTS SEG # BLD: 2.5 K/UL (ref 1.8–8)
NEUTS SEG NFR BLD: 71 % (ref 32–75)
NRBC # BLD: 0 K/UL (ref 0–0.01)
NRBC BLD-RTO: 0 PER 100 WBC
P2Y12 PLT RESPONSE,PPPR: 45 PRU (ref 194–418)
PLATELET # BLD AUTO: 249 K/UL (ref 150–400)
PMV BLD AUTO: 9.5 FL (ref 8.9–12.9)
POTASSIUM SERPL-SCNC: 4.2 MMOL/L (ref 3.5–5.1)
PROT SERPL-MCNC: 6.4 G/DL (ref 6.4–8.2)
RBC # BLD AUTO: 3.8 M/UL (ref 3.8–5.2)
SODIUM SERPL-SCNC: 137 MMOL/L (ref 136–145)
WBC # BLD AUTO: 3.5 K/UL (ref 3.6–11)

## 2020-06-15 PROCEDURE — 36415 COLL VENOUS BLD VENIPUNCTURE: CPT

## 2020-06-15 PROCEDURE — 85576 BLOOD PLATELET AGGREGATION: CPT

## 2020-06-15 PROCEDURE — 65610000006 HC RM INTENSIVE CARE

## 2020-06-15 PROCEDURE — 77030035304 HC CATH ANGI BENCHMARK PENU -G

## 2020-06-15 PROCEDURE — 03VL3DZ RESTRICTION OF LEFT INTERNAL CAROTID ARTERY WITH INTRALUMINAL DEVICE, PERCUTANEOUS APPROACH: ICD-10-PCS | Performed by: RADIOLOGY

## 2020-06-15 PROCEDURE — C1877 STENT, NON-COAT/COV W/O DEL: HCPCS

## 2020-06-15 PROCEDURE — 36224 PLACE CATH CAROTD ART: CPT

## 2020-06-15 PROCEDURE — 74011250636 HC RX REV CODE- 250/636: Performed by: RADIOLOGY

## 2020-06-15 PROCEDURE — C1887 CATHETER, GUIDING: HCPCS

## 2020-06-15 PROCEDURE — 85025 COMPLETE CBC W/AUTO DIFF WBC: CPT

## 2020-06-15 PROCEDURE — 77030012468 HC VLV BLEEDBK CNTRL ABBT -B

## 2020-06-15 PROCEDURE — 76210000063 HC OR PH I REC FIRST 0.5 HR

## 2020-06-15 PROCEDURE — B31N1ZZ FLUOROSCOPY OF OTHER UPPER ARTERIES USING LOW OSMOLAR CONTRAST: ICD-10-PCS | Performed by: RADIOLOGY

## 2020-06-15 PROCEDURE — 74011250636 HC RX REV CODE- 250/636: Performed by: ANESTHESIOLOGY

## 2020-06-15 PROCEDURE — 77030008638 HC TU CONN COOK -A

## 2020-06-15 PROCEDURE — 76060000038 HC ANESTHESIA 3.5 TO 4 HR

## 2020-06-15 PROCEDURE — 77030029286 HC COIL EMB DTCH ULT STRY -H

## 2020-06-15 PROCEDURE — C1894 INTRO/SHEATH, NON-LASER: HCPCS

## 2020-06-15 PROCEDURE — 77030029288 HC HNDL INZONE DTCH STRY -C

## 2020-06-15 PROCEDURE — 77030041031 HC CTRLR DETACH SYS MICV -C

## 2020-06-15 PROCEDURE — 77030040361 HC SLV COMPR DVT MDII -B

## 2020-06-15 PROCEDURE — C1769 GUIDE WIRE: HCPCS

## 2020-06-15 PROCEDURE — 74011250637 HC RX REV CODE- 250/637: Performed by: RADIOLOGY

## 2020-06-15 PROCEDURE — 74011000250 HC RX REV CODE- 250: Performed by: RADIOLOGY

## 2020-06-15 PROCEDURE — 77030008684 HC TU ET CUF COVD -B: Performed by: NURSE ANESTHETIST, CERTIFIED REGISTERED

## 2020-06-15 PROCEDURE — 77030026438 HC STYL ET INTUB CARD -A: Performed by: NURSE ANESTHETIST, CERTIFIED REGISTERED

## 2020-06-15 PROCEDURE — C1760 CLOSURE DEV, VASC: HCPCS

## 2020-06-15 PROCEDURE — 61624 TCAT PERM OCCLS/EMBOLJ CNS: CPT

## 2020-06-15 PROCEDURE — 74011250636 HC RX REV CODE- 250/636: Performed by: NURSE PRACTITIONER

## 2020-06-15 PROCEDURE — 85347 COAGULATION TIME ACTIVATED: CPT

## 2020-06-15 PROCEDURE — 80053 COMPREHEN METABOLIC PANEL: CPT

## 2020-06-15 RX ORDER — LABETALOL HYDROCHLORIDE 5 MG/ML
20 INJECTION, SOLUTION INTRAVENOUS
Status: DISCONTINUED | OUTPATIENT
Start: 2020-06-15 | End: 2020-06-16 | Stop reason: HOSPADM

## 2020-06-15 RX ORDER — FENTANYL CITRATE 50 UG/ML
INJECTION, SOLUTION INTRAMUSCULAR; INTRAVENOUS AS NEEDED
Status: DISCONTINUED | OUTPATIENT
Start: 2020-06-15 | End: 2020-06-15 | Stop reason: HOSPADM

## 2020-06-15 RX ORDER — SODIUM CHLORIDE 0.9 % (FLUSH) 0.9 %
5-40 SYRINGE (ML) INJECTION AS NEEDED
Status: DISCONTINUED | OUTPATIENT
Start: 2020-06-15 | End: 2020-06-15 | Stop reason: HOSPADM

## 2020-06-15 RX ORDER — LEVOTHYROXINE SODIUM 75 UG/1
75 TABLET ORAL
Status: DISCONTINUED | OUTPATIENT
Start: 2020-06-15 | End: 2020-06-15

## 2020-06-15 RX ORDER — MIDAZOLAM HYDROCHLORIDE 1 MG/ML
INJECTION, SOLUTION INTRAMUSCULAR; INTRAVENOUS
Status: COMPLETED
Start: 2020-06-15 | End: 2020-06-15

## 2020-06-15 RX ORDER — VERAPAMIL HYDROCHLORIDE 2.5 MG/ML
INJECTION, SOLUTION INTRAVENOUS
Status: DISCONTINUED
Start: 2020-06-15 | End: 2020-06-15 | Stop reason: WASHOUT

## 2020-06-15 RX ORDER — HEPARIN SODIUM 1000 [USP'U]/ML
INJECTION, SOLUTION INTRAVENOUS; SUBCUTANEOUS
Status: DISPENSED
Start: 2020-06-15 | End: 2020-06-15

## 2020-06-15 RX ORDER — FENTANYL CITRATE 50 UG/ML
INJECTION, SOLUTION INTRAMUSCULAR; INTRAVENOUS
Status: COMPLETED
Start: 2020-06-15 | End: 2020-06-15

## 2020-06-15 RX ORDER — CLOPIDOGREL BISULFATE 75 MG/1
75 TABLET ORAL DAILY
Status: DISCONTINUED | OUTPATIENT
Start: 2020-06-16 | End: 2020-06-16 | Stop reason: HOSPADM

## 2020-06-15 RX ORDER — PROPOFOL 10 MG/ML
INJECTION, EMULSION INTRAVENOUS AS NEEDED
Status: DISCONTINUED | OUTPATIENT
Start: 2020-06-15 | End: 2020-06-15 | Stop reason: HOSPADM

## 2020-06-15 RX ORDER — FENTANYL CITRATE 50 UG/ML
25 INJECTION, SOLUTION INTRAMUSCULAR; INTRAVENOUS
Status: COMPLETED | OUTPATIENT
Start: 2020-06-15 | End: 2020-06-15

## 2020-06-15 RX ORDER — VERAPAMIL HYDROCHLORIDE 2.5 MG/ML
2.5 INJECTION, SOLUTION INTRAVENOUS ONCE
Status: ACTIVE | OUTPATIENT
Start: 2020-06-15 | End: 2020-06-15

## 2020-06-15 RX ORDER — LIDOCAINE HYDROCHLORIDE 20 MG/ML
20 INJECTION, SOLUTION INFILTRATION; PERINEURAL ONCE
Status: COMPLETED | OUTPATIENT
Start: 2020-06-15 | End: 2020-06-15

## 2020-06-15 RX ORDER — PROPOFOL 10 MG/ML
INJECTION, EMULSION INTRAVENOUS
Status: DISCONTINUED | OUTPATIENT
Start: 2020-06-15 | End: 2020-06-15 | Stop reason: HOSPADM

## 2020-06-15 RX ORDER — GLYCOPYRROLATE 0.2 MG/ML
INJECTION INTRAMUSCULAR; INTRAVENOUS AS NEEDED
Status: DISCONTINUED | OUTPATIENT
Start: 2020-06-15 | End: 2020-06-15 | Stop reason: HOSPADM

## 2020-06-15 RX ORDER — ASPIRIN 325 MG
325 TABLET ORAL DAILY
Status: DISCONTINUED | OUTPATIENT
Start: 2020-06-16 | End: 2020-06-16 | Stop reason: HOSPADM

## 2020-06-15 RX ORDER — DEXAMETHASONE SODIUM PHOSPHATE 4 MG/ML
INJECTION, SOLUTION INTRA-ARTICULAR; INTRALESIONAL; INTRAMUSCULAR; INTRAVENOUS; SOFT TISSUE AS NEEDED
Status: DISCONTINUED | OUTPATIENT
Start: 2020-06-15 | End: 2020-06-15 | Stop reason: HOSPADM

## 2020-06-15 RX ORDER — ACETAMINOPHEN 325 MG/1
650 TABLET ORAL
Status: DISCONTINUED | OUTPATIENT
Start: 2020-06-15 | End: 2020-06-16 | Stop reason: HOSPADM

## 2020-06-15 RX ORDER — SODIUM CHLORIDE, SODIUM LACTATE, POTASSIUM CHLORIDE, CALCIUM CHLORIDE 600; 310; 30; 20 MG/100ML; MG/100ML; MG/100ML; MG/100ML
INJECTION, SOLUTION INTRAVENOUS
Status: DISCONTINUED | OUTPATIENT
Start: 2020-06-15 | End: 2020-06-15 | Stop reason: HOSPADM

## 2020-06-15 RX ORDER — SODIUM CHLORIDE 9 MG/ML
75 INJECTION, SOLUTION INTRAVENOUS CONTINUOUS
Status: DISCONTINUED | OUTPATIENT
Start: 2020-06-15 | End: 2020-06-16

## 2020-06-15 RX ORDER — SODIUM CHLORIDE 0.9 % (FLUSH) 0.9 %
5-40 SYRINGE (ML) INJECTION EVERY 8 HOURS
Status: DISCONTINUED | OUTPATIENT
Start: 2020-06-15 | End: 2020-06-15 | Stop reason: HOSPADM

## 2020-06-15 RX ORDER — ONDANSETRON 2 MG/ML
4 INJECTION INTRAMUSCULAR; INTRAVENOUS AS NEEDED
Status: DISCONTINUED | OUTPATIENT
Start: 2020-06-15 | End: 2020-06-15 | Stop reason: HOSPADM

## 2020-06-15 RX ORDER — MIDAZOLAM HYDROCHLORIDE 1 MG/ML
INJECTION, SOLUTION INTRAMUSCULAR; INTRAVENOUS AS NEEDED
Status: DISCONTINUED | OUTPATIENT
Start: 2020-06-15 | End: 2020-06-15 | Stop reason: HOSPADM

## 2020-06-15 RX ORDER — LIDOCAINE HYDROCHLORIDE 20 MG/ML
INJECTION, SOLUTION EPIDURAL; INFILTRATION; INTRACAUDAL; PERINEURAL AS NEEDED
Status: DISCONTINUED | OUTPATIENT
Start: 2020-06-15 | End: 2020-06-15 | Stop reason: HOSPADM

## 2020-06-15 RX ORDER — ONDANSETRON 2 MG/ML
INJECTION INTRAMUSCULAR; INTRAVENOUS AS NEEDED
Status: DISCONTINUED | OUTPATIENT
Start: 2020-06-15 | End: 2020-06-15 | Stop reason: HOSPADM

## 2020-06-15 RX ORDER — SODIUM CHLORIDE 0.9 % (FLUSH) 0.9 %
5-40 SYRINGE (ML) INJECTION EVERY 8 HOURS
Status: DISCONTINUED | OUTPATIENT
Start: 2020-06-15 | End: 2020-06-16 | Stop reason: HOSPADM

## 2020-06-15 RX ORDER — GLYCOPYRROLATE 0.2 MG/ML
INJECTION INTRAMUSCULAR; INTRAVENOUS
Status: DISPENSED
Start: 2020-06-15 | End: 2020-06-16

## 2020-06-15 RX ORDER — HYDROMORPHONE HYDROCHLORIDE 1 MG/ML
0.2 INJECTION, SOLUTION INTRAMUSCULAR; INTRAVENOUS; SUBCUTANEOUS
Status: DISCONTINUED | OUTPATIENT
Start: 2020-06-15 | End: 2020-06-15 | Stop reason: HOSPADM

## 2020-06-15 RX ORDER — SODIUM CHLORIDE 0.9 % (FLUSH) 0.9 %
5-40 SYRINGE (ML) INJECTION AS NEEDED
Status: DISCONTINUED | OUTPATIENT
Start: 2020-06-15 | End: 2020-06-16 | Stop reason: HOSPADM

## 2020-06-15 RX ORDER — ROCURONIUM BROMIDE 10 MG/ML
INJECTION, SOLUTION INTRAVENOUS AS NEEDED
Status: DISCONTINUED | OUTPATIENT
Start: 2020-06-15 | End: 2020-06-15 | Stop reason: HOSPADM

## 2020-06-15 RX ORDER — LIDOCAINE HYDROCHLORIDE 10 MG/ML
0.1 INJECTION, SOLUTION EPIDURAL; INFILTRATION; INTRACAUDAL; PERINEURAL AS NEEDED
Status: DISCONTINUED | OUTPATIENT
Start: 2020-06-15 | End: 2020-06-15 | Stop reason: HOSPADM

## 2020-06-15 RX ORDER — HEPARIN SODIUM 1000 [USP'U]/ML
INJECTION, SOLUTION INTRAVENOUS; SUBCUTANEOUS AS NEEDED
Status: DISCONTINUED | OUTPATIENT
Start: 2020-06-15 | End: 2020-06-15

## 2020-06-15 RX ORDER — ASPIRIN 325 MG
325 TABLET ORAL
Status: COMPLETED | OUTPATIENT
Start: 2020-06-15 | End: 2020-06-15

## 2020-06-15 RX ORDER — HEPARIN SODIUM 1000 [USP'U]/ML
INJECTION, SOLUTION INTRAVENOUS; SUBCUTANEOUS AS NEEDED
Status: DISCONTINUED | OUTPATIENT
Start: 2020-06-15 | End: 2020-06-15 | Stop reason: HOSPADM

## 2020-06-15 RX ADMIN — SODIUM CHLORIDE 75 ML/HR: 900 INJECTION, SOLUTION INTRAVENOUS at 13:44

## 2020-06-15 RX ADMIN — ASPIRIN 325 MG ORAL TABLET 325 MG: 325 PILL ORAL at 17:52

## 2020-06-15 RX ADMIN — Medication 10 ML: at 22:00

## 2020-06-15 RX ADMIN — FENTANYL CITRATE 25 MCG: 50 INJECTION INTRAMUSCULAR; INTRAVENOUS at 12:55

## 2020-06-15 RX ADMIN — HEPARIN SODIUM 3000 UNITS: 1000 INJECTION, SOLUTION INTRAVENOUS; SUBCUTANEOUS at 10:05

## 2020-06-15 RX ADMIN — HEPARIN SODIUM 4000 UNITS: 1000 INJECTION INTRAVENOUS; SUBCUTANEOUS at 08:00

## 2020-06-15 RX ADMIN — ROCURONIUM BROMIDE 20 MG: 10 INJECTION, SOLUTION INTRAVENOUS at 11:12

## 2020-06-15 RX ADMIN — PROPOFOL 125 MCG/KG/MIN: 10 INJECTION, EMULSION INTRAVENOUS at 08:54

## 2020-06-15 RX ADMIN — FENTANYL CITRATE 25 MCG: 50 INJECTION INTRAMUSCULAR; INTRAVENOUS at 13:16

## 2020-06-15 RX ADMIN — ONDANSETRON 4 MG: 2 INJECTION INTRAMUSCULAR; INTRAVENOUS at 11:58

## 2020-06-15 RX ADMIN — HEPARIN SODIUM 4000 UNITS: 1000 INJECTION INTRAVENOUS; SUBCUTANEOUS at 08:03

## 2020-06-15 RX ADMIN — LIDOCAINE HYDROCHLORIDE 100 MG: 20 INJECTION, SOLUTION EPIDURAL; INFILTRATION; INTRACAUDAL; PERINEURAL at 08:51

## 2020-06-15 RX ADMIN — ROCURONIUM BROMIDE 20 MG: 10 INJECTION, SOLUTION INTRAVENOUS at 09:21

## 2020-06-15 RX ADMIN — SODIUM CHLORIDE, SODIUM LACTATE, POTASSIUM CHLORIDE, CALCIUM CHLORIDE: 600; 310; 30; 20 INJECTION, SOLUTION INTRAVENOUS at 08:54

## 2020-06-15 RX ADMIN — HYDROMORPHONE HYDROCHLORIDE 0.2 MG: 1 INJECTION, SOLUTION INTRAMUSCULAR; INTRAVENOUS; SUBCUTANEOUS at 12:50

## 2020-06-15 RX ADMIN — MIDAZOLAM HYDROCHLORIDE 2 MG: 1 INJECTION, SOLUTION INTRAMUSCULAR; INTRAVENOUS at 08:46

## 2020-06-15 RX ADMIN — HEPARIN SODIUM 2000 UNITS: 1000 INJECTION, SOLUTION INTRAVENOUS; SUBCUTANEOUS at 10:26

## 2020-06-15 RX ADMIN — FENTANYL CITRATE 100 MCG: 50 INJECTION, SOLUTION INTRAMUSCULAR; INTRAVENOUS at 08:51

## 2020-06-15 RX ADMIN — FENTANYL CITRATE 25 MCG: 50 INJECTION INTRAMUSCULAR; INTRAVENOUS at 13:05

## 2020-06-15 RX ADMIN — FENTANYL CITRATE 25 MCG: 50 INJECTION INTRAMUSCULAR; INTRAVENOUS at 12:50

## 2020-06-15 RX ADMIN — SODIUM CHLORIDE, SODIUM LACTATE, POTASSIUM CHLORIDE, CALCIUM CHLORIDE: 600; 310; 30; 20 INJECTION, SOLUTION INTRAVENOUS at 08:58

## 2020-06-15 RX ADMIN — LIDOCAINE HYDROCHLORIDE 10 MG: 20 INJECTION, SOLUTION INFILTRATION; PERINEURAL at 10:26

## 2020-06-15 RX ADMIN — HEPARIN SODIUM 4000 UNITS: 1000 INJECTION INTRAVENOUS; SUBCUTANEOUS at 08:05

## 2020-06-15 RX ADMIN — HEPARIN SODIUM 4000 UNITS: 1000 INJECTION INTRAVENOUS; SUBCUTANEOUS at 08:01

## 2020-06-15 RX ADMIN — ROCURONIUM BROMIDE 30 MG: 10 INJECTION, SOLUTION INTRAVENOUS at 08:51

## 2020-06-15 RX ADMIN — DEXAMETHASONE SODIUM PHOSPHATE 4 MG: 4 INJECTION, SOLUTION INTRA-ARTICULAR; INTRALESIONAL; INTRAMUSCULAR; INTRAVENOUS; SOFT TISSUE at 08:58

## 2020-06-15 RX ADMIN — PROPOFOL 150 MG: 10 INJECTION, EMULSION INTRAVENOUS at 08:51

## 2020-06-15 RX ADMIN — GLYCOPYRROLATE 0.2 MG: 0.2 INJECTION INTRAMUSCULAR; INTRAVENOUS at 09:38

## 2020-06-15 RX ADMIN — HEPARIN SODIUM 4000 UNITS: 1000 INJECTION INTRAVENOUS; SUBCUTANEOUS at 08:04

## 2020-06-15 RX ADMIN — HEPARIN SODIUM 4000 UNITS: 1000 INJECTION INTRAVENOUS; SUBCUTANEOUS at 08:06

## 2020-06-15 RX ADMIN — PROPOFOL: 10 INJECTION, EMULSION INTRAVENOUS at 11:17

## 2020-06-15 NOTE — PROGRESS NOTES
Spiritual Care Assessment/Progress Note  [facility]      NAME: Akilah Terry      MRN: [id number]  AGE: 56 y.o. SEX: female  Protestant Affiliation: Confucianist   Language: English     6/15/2020           Spiritual Assessment begun in [facility] [place] 7S1 INTENSIVE CARE through conversation with:         []Patient        [] Family    [] Friend(s)        Reason for Consult: Initial/Spiritual assessment, critical care     Spiritual beliefs: (Please include comment if needed)     [] Identifies with a yoel tradition:         [] Supported by a yoel community:            [] Claims no spiritual orientation:           [] Seeking spiritual identity:                [] Adheres to an individual form of spirituality:           [x] Not able to assess:                           Identified resources for coping:      [] Prayer                               [] Music                  [] Guided Imagery     [] Family/friends                 [] Pet visits     [] Devotional reading                         [] Unknown     [] Other:                                               Interventions offered during this visit: (See comments for more details)    Patient Interventions: Initial visit, Other (comment)(Note left at bedside )     Family/Friend(s): Other (comment)(Left note at bedside)     Plan of Care:     [] Support spiritual and/or cultural needs    [] Support AMD and/or advance care planning process      [] Support grieving process   [] Coordinate Rites and/or Rituals    [] Coordination with community clergy   [] No spiritual needs identified at this time   [] Detailed Plan of Care below (See Comments)  [] Make referral to Music Therapy  [] Make referral to Pet Therapy     [] Make referral to Addiction services  [] Make referral to [facility]  [] Make referral to Spiritual Care Partner  [] No future visits requested        [] Follow up visits as needed     Comments: Visited Ms Shantel Melgar in [place] for initial spiritual assessment.  Ms Shantel Melgar was resting with her eyes closed and no family was present. Left note assuring patient and family of  availability for support. : Rev. Karon Clark.  Debbie Johnson; [facility], to contact [phone] [address] Powell call: [phone]

## 2020-06-15 NOTE — PROGRESS NOTES
240pm Rec'd  Patient from PACU  alert and oriented L groin site c/d/i  No bleeding or hematoma noted at present. 's- 152 SBP HR at present time 49, will continue to monitor. Patient lying flat at present neuro assessment done. + pluses noted throught out. Voiced no c/o. SCD's placed on patient . Primary Nurse Joe Viveros, RN and Robert Olmedo , RN performed a dual skin assessment on this patient No impairment noted  Jose Maria score is 18    1600 Continue to monitor patient v/s and pluses as ordered. Resting in bed. at present. Sitting up in bed. Blood noted on L groin site drsg marked,  Patient tolerate ice chips and h20.    1730 jello and juice taken well and tolerated loco cath dc'ed as ordered by Dr. Freddy Lawrence. Patient taking on phone voiced no c/o.

## 2020-06-15 NOTE — PROGRESS NOTES
Pt ambulated to unit neuro intact. answered all questions, WNL ot neuro exam.  Pt for coiling   of left ICA

## 2020-06-15 NOTE — ANESTHESIA PREPROCEDURE EVALUATION
Relevant Problems   No relevant active problems       Anesthetic History     PONV and malignant hyperthermia          Review of Systems / Medical History  Patient summary reviewed, nursing notes reviewed and pertinent labs reviewed    Pulmonary  Within defined limits                 Neuro/Psych   Within defined limits           Cardiovascular                  Exercise tolerance: >4 METS     GI/Hepatic/Renal  Within defined limits              Endo/Other      Hypothyroidism       Other Findings              Physical Exam    Airway  Mallampati: I  TM Distance: > 6 cm  Neck ROM: normal range of motion   Mouth opening: Normal     Cardiovascular    Rhythm: regular  Rate: normal         Dental  No notable dental hx       Pulmonary  Breath sounds clear to auscultation               Abdominal         Other Findings            Anesthetic Plan    ASA: 3  Anesthesia type: total IV anesthesia          Induction: Intravenous  Anesthetic plan and risks discussed with: Patient

## 2020-06-15 NOTE — BRIEF OP NOTE
NEUROINTERVENTIONAL SURGERY POST-PROCEDURE NOTE    PROCEDURE:  Cerebral angiogram and stent-assisted coil embolization of left ICA aneurysm    VESSEL(S) STUDIED:  1. Left ICA  2. Left CCA VESSEL(S) TREATED:  1. Left ICA        PRELIMINARY REPORT & DISPOSITION:   Irregular left ICA aneurysm again noted. Status post stent-assisted coil embolization of left ICA aneurysm using a 4 x 21 mm Neuroform [place] stent and three detachable aneurysm coils, resulting in complete occlusion of the aneurysm sac. Good flow in left ICA and large left PCOM. COMPLICATIONS:  Vasospasm or mild non-flow limiting dissection of cervical left ICA    FOLLOW-UP:  Admit to ICU  SBP   Continue DAPT  Check aspirin test and P2Y12 DATE OF SERVICE:  6/15/2020 12:12 PM     ATTENDING SURGEON(S):  MD Selvin Bass MD    ANESTHESIA:   GA    MEDICATIONS:   See nursing record    PUNCTURE SITE:  Left common femoral artery. Arteriotomy closed with AngioSeal.  Flat with leg straight x 2 hours. Loli Lezama M.D.     Davina Vinson    , Department of Radiology  [facility]

## 2020-06-15 NOTE — ANESTHESIA PROCEDURE NOTES
Arterial Line Placement    Start time: 6/15/2020 8:40 AM  End time: 6/15/2020 8:50 AM  Performed by: Melodie Alonso MD  Authorized by: Melodie Alonso MD     Pre-Procedure  Indications:  Arterial pressure monitoring  Preanesthetic Checklist: patient identified, risks and benefits discussed, anesthesia consent, site marked, patient being monitored, timeout performed and patient being monitored      Procedure:   Prep:  Chlorhexidine  Orientation:  Left  Location:  Radial artery  Catheter size:  20 G  Number of attempts:  1    Assessment:   Post-procedure:  Line secured and sterile dressing applied  Patient Tolerance:  Patient tolerated the procedure well with no immediate complications

## 2020-06-15 NOTE — PROGRESS NOTES
NIS Post-angio check    Patient is s/p scheduled cerebral angiogram today for stent-assisted coil embolization of left ICA aneurysm by Dr. Michelle Rico. Patient is doing well post-angio. She denies any neurological symptoms (headache, vision changes, nausea, vomiting, numbness, tingling, weakness, or speech difficulty). Patient informed me that she would not like to take her Synthroid here in the hospital because it costs too much and she is going to wait until she is discharged home tomorrow to take it. Neurologic Exam:  Mental Status:  Alert and oriented x 4. Appropriate affect, mood and behavior. Language:    Normal fluency, repetition, comprehension and naming. Cranial Nerves:        Pupils equal, round and reactive to light. Visual fields full to confrontation. Extraocular movements intact. Facial sensation intact. Full facial strength, no asymmetry. Hearing grossly intact bilaterally. No dysarthria. Tongue protrudes to midline, palate elevates symmetrically. Motor:    No pronator drift. Bulk and tone normal.      5/5 power in all extremities proximally and distally. No involuntary movements. Sensation:    Sensation intact throughout to light touch    Coordination & Gait: No ataxia with FTN and HTS bilaterally. Gait deferred. SKIN: Left groin site dressing saturated. Dressing intact. No hematoma or bruising noted. No further bleeding noted from dressing site. Continue to monitor. Skin warm to touch. Distal pulses 2+ bilaterally. Assessment/Plan:  Left ICA aneurysm s/p stent-assisted coil embolization  - Continue with post-angio neurovascular checks per protocol  - SBP goal , Cardene/Labetalol PRN  - Continue DAPT with Aspirin 325 mg daily and 75 mg of Plavix daily, Aspirin Test 529 (slightly increased from previous result of 518), additional dose of of 325 mg of Aspirin given this evening per Dr. Michelle Rcio.  Will recheck Aspirin test in AM, repeat P2Y12 this afternoon therapeutic at 45.  - neuro checks every hour      Bennie Jules NP  Neurocritical Care Nurse Practitioner  [phone]

## 2020-06-15 NOTE — PROGRESS NOTES
Hyoscyamine Denied     Formulary reason  Prescription Drug Insurance: [employer]  Denial reason:     Appeal information: None  Please reply to telephone encounter on how to proceed with medication authorization.      Patient has the option to pay out of pocket if needed.     Placed 16fr loco catheter.   Pt is under the care of anesthesia service

## 2020-06-15 NOTE — H&P
Neurointerventional Surgery History and Physical  Phylicia Flowers AGACNP-BC    Patient: Sanjuana Cuellar MRN: [id number]  SSN: [id number]    YOB: 1960  Age: 56 y.o. Sex: female        Chief Complaint: cerebral aneurysm and headaches    Subjective:      Sanjuana Cuellar is a 56 y.o. right-handed female with a PMH of anxiety and hypothyroidism who was initially evaluated in the [facility] office for left-sided pulsaltile tinnitus that started back in October of last year. She was referred by Dr. Erin Lizarraga at [facility]. The patient reportedly experienced ringing in both ears for 6-7 years, but it has been worse on the left. Over the past 8 months, she has experienced the ringing sound that is now pulsatile in nature. Around the same time that the pulsatile nature of the ringing sound started 8 months ago, patient had a \"whiplash\" like event while turning her neck, which caused nausea, dizziness, and she \"couldn't use muscles. \" This occurred after a long hike the day before. She experienced headaches and pain in the right neck, back, and down her spine. She saw the chiropractor the next day, and the pain only became worse. Evaluation by Dr. Erin Lizarraga revealed bilateral sensorineural hearing loss. After her initial office visit with Dr. An Moreno she under went a CTA head/neck. There were no arterial abnormalities, anatomic variants, or venous stenosis that could be the source of the tinnitus. An MRA of the Brain was also obtained which showed no evidence of arteriovenous shunting however it did reveal a 3.5 X 1.5 mm left ICA aneurysm. A cerebral angiogram was performed on 5/20/2020 which showed an incidental finding of an irregular, wide neck aneurysm arising from the left ICA, adjacent to the origin of the left posterior communicating artery and measuring 4 x 2.3 mm with a tiny bleb at its apex. There is no evidence of arteriovenous shunting or venous sinus stenosis to explain patient's pulsatile tinnitus.  She still has some mild ringing in her left ear intermittently. She will intermittently have dull frontal headaches that are not bothersome. She denies any fever, chills, vision changes, chest pain, SOB, palpitations, nausea, vomiting, weakness, numbness, tingling, speech difficulty, difficulty with balance or coordination. Patient denies any changes to her history since her virtual visit with Dr. Michelle Rico in June. She is here today for planned cerebral angiogram for stent-assisted coil embolization of the left ICA aneurysm. The patient will be admitted to the ICU post-procedure for close monitoring post procedure. She was started on Aspirin 325 mg daily and 75 mg of Plavix prior to the scheduled procedure. Aspirin and P2Y12 test are therapeutic. COVID-19 test was performed per pre-procedure protocol on 6/9/2020 and results were negative. She denies any family history of aneurysms.     Past Medical History:   Diagnosis Date    Anxiety     Anxiety     At risk for malignant hyperthermia     [facility] PRECAUTIONS    Endocrine disease     hypothyroid    Gallbladder polyp     Neurological disorder     Thyroid disease      Past Surgical History:   Procedure Laterality Date    HX GYN      HX OVARIAN CYST REMOVAL      HX OVARIAN CYST REMOVAL Right       Family History   Problem Relation Age of Onset    Hypertension Mother     Dementia Father     No Known Problems Sister     No Known Problems Brother     No Known Problems Maternal Aunt     No Known Problems Maternal Uncle     No Known Problems Paternal Aunt     No Known Problems Paternal Uncle     Diabetes Maternal Grandmother     Cancer Maternal Grandfather     Cancer Paternal Grandmother     No Known Problems Paternal Grandfather      Social History     Tobacco Use    Smoking status: Never Smoker    Smokeless tobacco: Never Used   Substance Use Topics    Alcohol use: Yes     Comment: rarely      Current Facility-Administered Medications   Medication Dose Route Frequency Provider Last Rate Last Dose    lidocaine (XYLOCAINE) 20 mg/mL (2 %) injection 400 mg  20 mL SubCUTAneous ONCE Marta Cifuentes MD        heparin 4000 units/1000 mL (4 units/mL) in NS infusion **FOR ANGIO USE ONLY**   IntraVENous RAD PRN Marta Cifuentes MD   4,000 Units at 06/15/20 0806    iopamidoL (ISOVUE 300) 61 % contrast injection 100 mL  100 mL IntraCATHeter RAD ONCE Marta Cifuentes MD        iopamidoL (ISOVUE 300) 61 % contrast injection 100 mL  100 mL IntraCATHeter RAD ONCE Marta Cifuentes MD        iopamidoL (ISOVUE 300) 61 % contrast injection 100 mL  100 mL IntraCATHeter RAD ONCE Marta Cifuentes MD        iopamidoL (ISOVUE 300) 61 % contrast injection             verapamiL (ISOPTIN) 2.5 mg/mL injection 2.5 mg  2.5 mg IntraVENous ONCE Marta Cifuentes MD        verapamiL (ISOPTIN) 2.5 mg/mL injection             fentaNYL citrate (PF) 50 mcg/mL injection             midazolam (VERSED) 1 mg/mL injection             heparin (porcine) 1,000 unit/mL injection                 Allergies   Allergen Reactions    Acyclovir Nausea and Vomiting    Morphine Nausea and Vomiting    Pseudoephedrine Hcl Unknown (comments)       Review of Systems:  Pertinent items are noted in the History of Present Illness. Objective:     Vitals:    06/15/20 0748   BP: 129/45   Pulse: 64   Resp: 20   Temp: 98.7 °F (37.1 °C)   SpO2: 100%        Physical Exam:  GENERAL: alert, cooperative, no distress, appears stated age  EYE: conjunctivae/corneas clear. PERRL, EOM's intact. NECK: neck supple and symmetrical.  no carotid bruits  LUNG: clear to auscultation bilaterally  HEART: regular rate and rhythm, S1, S2 normal, no murmur, click, rub or gallop  ABDOMEN: soft, non-tender. Bowel sounds normal. No masses,  no organomegaly  EXTREMITI ES:  extremities normal, atraumatic, no cyanosis or edema. 2+ distal pulses bilaterally. SKIN: Warm to touch. Appropriate for ethnicity. No rashes noted. Neurologic Exam:  Mental Status:  Alert and oriented x 4. Appropriate affect, mood and behavior. Language:    Normal fluency, repetition, comprehension and naming. Cranial Nerves:        Pupils equal, round and reactive to light. Visual fields full to confrontation. Extraocular movements intact. Facial sensation intact. Full facial strength, no asymmetry. Hearing grossly intact bilaterally. No dysarthria. Tongue protrudes to midline, palate elevates symmetrically. Motor:    No pronator drift. Bulk and tone normal.      5/5 power in all extremities proximally and distally. No involuntary movements. Sensation:    Sensation intact throughout to light touch. No neglect. Reflexes:    Deferred. Coordination & Gait: No ataxia with FTN and HTS bilaterally. Gait deferred.     NIHSS:      1a-LOC:0    1b-Month/Age:0    1c-Open/Close Hand:0    2-Best Gaze:0    3-Visual Fields:0    4-Facial Palsy:0    5a-Left Arm:0    5b-Right Arm:0    6a-Left Le    6b-Right Le    7-Limb Ataxia:0    8-Sensory:0    9-Best Language:0    10-Dysarthria:0    11-Extinction/Inattention:0  TOTAL SCORE: 0    Labs:  Lab Results   Component Value Date/Time    Sodium 142 2020 09:15 AM    Potassium 4.9 2020 09:15 AM    Chloride 102 2020 09:15 AM    CO2 25 2020 09:15 AM    Anion gap 7 2015 06:05 AM    Glucose 53 (L) 2020 09:15 AM    BUN 13 2020 09:15 AM    Creatinine 0.84 2020 09:15 AM    BUN/Creatinine ratio 15 2020 09:15 AM    GFR est AA 86 2020 09:15 AM    GFR est non-AA 75 2020 09:15 AM    Calcium 10.3 2020 09:15 AM     Lab Results   Component Value Date/Time    WBC 4.9 2020 09:15 AM    HGB 13.8 2020 09:15 AM    HCT 41.3 2020 09:15 AM    PLATELET 631  09:15 AM    MCV 92 2020 09:15 AM     Lab Results   Component Value Date/Time    MCH 30.8 2020 09:15 AM    MCHC 33.4 06/09/2020 09:15 AM    BASOPHILS 1 03/14/2019 04:21 PM    ABS. LYMPHOCYTES 2.0 03/11/2015 05:33 AM    ABS. MONOCYTES 0.3 03/14/2019 04:21 PM    ABS. EOSINOPHILS 0.1 03/14/2019 04:21 PM    ABS. BASOPHILS 0.0 03/14/2019 04:21 PM     P2Y12 on 6/9/2020 at 0945  results 68. Aspirin test on 6/9/2020 at 0945 results 518. Imaging:  MRA of Brain on 2/28/2020 shows  IMPRESSION:    1. No evidence of significant stenosis. No evidence of vascular malformation. 2. Stable 3.5 x 1.5 mm inferiorly directed left posterior communicating artery  aneurysm.     Cerebral Angiogram on 5/20/2020 shows  IMPRESSION:  1. Irregular, wide neck aneurysm arising from the left ICA, adjacent to the  origin of the left posterior communicating artery, measures 4 x 2.3 mm and has a  tiny bleb at its apex.     2. No evidence of early arteriovenous shunting or venous sinus stenosis. Assessment:     Hospital Problems  Date Reviewed: 6/2/2020          Codes Class Noted POA    Cerebral aneurysm ICD-10-CM: I67.1  ICD-9-CM: 437.3  6/2/2020 Yes            24-year-old female with left-sided pulsatile tinnitus and wide neck, irregular 4 x 2.3 mm left ICA posterior communicating artery aneurysm. Due to the irregularity of this aneurysm endovascular treatment is indicated. Neuro exam is non-focal.   Plan:   - Proceed with cerebral angiogram with stent-assisted coil embolization of left ICA aneurysm. Written informed consent was obtained from the patient. Patient understands the risks, benefits, and alternatives of the procedure. Patient will be admitted to the ICU post-angiogram. Patient has been taking full dose Aspirin (325 mg) and Plavix 75 mg daily prior to the procedure. Aspirin and P2Y12 test are both therapeutic. Plan discussed with Dr. [place] Valentine.      Signed By: Kina Maldonado NP     Janelle 15, 2020

## 2020-06-16 VITALS
DIASTOLIC BLOOD PRESSURE: 52 MMHG | SYSTOLIC BLOOD PRESSURE: 100 MMHG | OXYGEN SATURATION: 100 % | RESPIRATION RATE: 13 BRPM | HEART RATE: 59 BPM | TEMPERATURE: 98.2 F | BODY MASS INDEX: 23.71 KG/M2 | WEIGHT: 129.63 LBS

## 2020-06-16 LAB
ANION GAP SERPL CALC-SCNC: 5 MMOL/L (ref 5–15)
ASPIRIN TEST, ASPIRN: 491 ARU
BUN SERPL-MCNC: 13 MG/DL (ref 6–20)
BUN/CREAT SERPL: 19 (ref 12–20)
CALCIUM SERPL-MCNC: 8.4 MG/DL (ref 8.5–10.1)
CHLORIDE SERPL-SCNC: 114 MMOL/L (ref 97–108)
CO2 SERPL-SCNC: 24 MMOL/L (ref 21–32)
CREAT SERPL-MCNC: 0.7 MG/DL (ref 0.55–1.02)
ERYTHROCYTE [DISTWIDTH] IN BLOOD BY AUTOMATED COUNT: 13.2 % (ref 11.5–14.5)
GLUCOSE SERPL-MCNC: 89 MG/DL (ref 65–100)
HCT VFR BLD AUTO: 32.6 % (ref 35–47)
HGB BLD-MCNC: 10.5 G/DL (ref 11.5–16)
MAGNESIUM SERPL-MCNC: 2.1 MG/DL (ref 1.6–2.4)
MCH RBC QN AUTO: 30.1 PG (ref 26–34)
MCHC RBC AUTO-ENTMCNC: 32.2 G/DL (ref 30–36.5)
MCV RBC AUTO: 93.4 FL (ref 80–99)
NRBC # BLD: 0 K/UL (ref 0–0.01)
NRBC BLD-RTO: 0 PER 100 WBC
P2Y12 PLT RESPONSE,PPPR: 81 PRU (ref 194–418)
PHOSPHATE SERPL-MCNC: 3.1 MG/DL (ref 2.6–4.7)
PLATELET # BLD AUTO: 255 K/UL (ref 150–400)
PMV BLD AUTO: 9.6 FL (ref 8.9–12.9)
POTASSIUM SERPL-SCNC: 4.2 MMOL/L (ref 3.5–5.1)
RBC # BLD AUTO: 3.49 M/UL (ref 3.8–5.2)
SODIUM SERPL-SCNC: 143 MMOL/L (ref 136–145)
WBC # BLD AUTO: 7.8 K/UL (ref 3.6–11)

## 2020-06-16 PROCEDURE — 36415 COLL VENOUS BLD VENIPUNCTURE: CPT

## 2020-06-16 PROCEDURE — 74011250637 HC RX REV CODE- 250/637: Performed by: NURSE PRACTITIONER

## 2020-06-16 PROCEDURE — 85576 BLOOD PLATELET AGGREGATION: CPT

## 2020-06-16 PROCEDURE — 84100 ASSAY OF PHOSPHORUS: CPT

## 2020-06-16 PROCEDURE — 74011250636 HC RX REV CODE- 250/636: Performed by: NURSE PRACTITIONER

## 2020-06-16 PROCEDURE — 80048 BASIC METABOLIC PNL TOTAL CA: CPT

## 2020-06-16 PROCEDURE — 83735 ASSAY OF MAGNESIUM: CPT

## 2020-06-16 PROCEDURE — 85027 COMPLETE CBC AUTOMATED: CPT

## 2020-06-16 RX ADMIN — ASPIRIN 325 MG ORAL TABLET 325 MG: 325 PILL ORAL at 09:05

## 2020-06-16 RX ADMIN — CLOPIDOGREL BISULFATE 75 MG: 75 TABLET ORAL at 09:05

## 2020-06-16 NOTE — PROGRESS NOTES
1930. Bedside and Verbal shift change report given to Sarah Walker RN (oncoming nurse) by Alyson Soto RN (offgoing nurse). Report included the following information SBAR, Kardex, OR Summary, Procedure Summary, Intake/Output, MAR, Accordion, Recent Results, Med Rec Status, Cardiac Rhythm NSR/SB, Alarm Parameters  and Dual Neuro Assessment. Primary Nurse Hamida Gloria and Sarah Walker RN performed a dual skin assessment on this patient No impairment noted  Jose Maria score is 23    Shift Summary. No neuro changes overnight. Pt remains A/Ox4, AREVALO's, Pupils equal & reactive. Left groin site C/D/I. Mild bruising noted, pt reports no pain, pulses palpable. 0730. Bedside and Verbal shift change report given to Sarah Walker RN (oncoming nurse) by Alyson Soto RN (offgoing nurse). Report included the following information SBAR, Kardex, ED Summary, OR Summary, Procedure Summary, Intake/Output, MAR, Accordion, Recent Results, Med Rec Status and Cardiac Rhythm NSR/SB.

## 2020-06-16 NOTE — ANESTHESIA POSTPROCEDURE EVALUATION
Post-Anesthesia Evaluation and Assessment    Patient: Elvie Calhoun MRN: [id number]  SSN: [id number]    YOB: 1960  Age: 56 y.o. Sex: female      I have evaluated the patient and they are stable and ready for discharge from the PACU. Cardiovascular Function/Vital Signs  Visit Vitals  /52   Pulse (!) 59   Temp 36.8 °C (98.2 °F)   Resp 13   Wt 58.8 kg (129 lb 10.1 oz)   SpO2 100%   BMI 23.71 kg/m²       Patient is status post * No anesthesia type entered * anesthesia for * No procedures listed *. Nausea/Vomiting: None    Postoperative hydration reviewed and adequate. Pain:  Pain Scale 1: Numeric (0 - 10) (06/16/20 0800)  Pain Intensity 1: 0 (06/16/20 0800)   Managed    Neurological Status:   Neuro  Neurologic State: Alert (06/16/20 0800)  Orientation Level: Oriented X4 (06/16/20 0800)  Cognition: Appropriate decision making (06/16/20 0800)  Speech: Clear (06/16/20 0800)  Assessment L Pupil: Brisk;Round (06/16/20 0800)  Size L Pupil (mm): 3 (06/16/20 0800)  Assessment R Pupil: Brisk;Round (06/16/20 0800)  Size R Pupil (mm): 3 (06/16/20 0800)  LUE Motor Response: Purposeful;Spontaneous  (06/16/20 0800)  LLE Motor Response: Spontaneous ; Purposeful (06/16/20 0800)  RUE Motor Response: Spontaneous ; Purposeful (06/16/20 0800)  RLE Motor Response: Purposeful;Spontaneous  (06/16/20 0800)   At baseline    Mental Status, Level of Consciousness: Alert and  oriented to person, place, and time    Pulmonary Status:   O2 Device: Room air (06/16/20 0800)   Adequate oxygenation and airway patent    Complications related to anesthesia: None    Post-anesthesia assessment completed.  No concerns    Signed By: Peace Roque MD     June 16, 2020              * No procedures listed *.    total IV anesthesia, general    <BSHSIANPOST>    INITIAL Post-op Vital signs:   Vitals Value Taken Time   /56 6/15/2020  2:15 PM   Temp 36.4 °C (97.6 °F) 6/15/2020 12:28 PM   Pulse 63 6/15/2020  2:16 PM   Resp 13 6/15/2020  2:16 PM   SpO2 100 % 6/15/2020  2:16 PM   Vitals shown include unvalidated device data.

## 2020-06-16 NOTE — PROGRESS NOTES
Neurocritical Care Brief Progress Note:    57-year-old female with history of anxiety and hypothyroidism who was initially seen by [facility] due to left-sided pulsatile tinnitus that started back in 10/2019. After her initial office visit with Dr. Ru Metz she under went a CTA head/neck. There were no arterial abnormalities, anatomic variants, or venous stenosis that could be the source of the tinnitus. An MRA of the Brain was also obtained which showed no evidence of arteriovenous shunting however it did reveal a 3.5 X 1.5 mm left ICA aneurysm. A cerebral angiogram was performed on 5/20/2020 which showed an incidental finding of an irregular, wide neck aneurysm arising from the left ICA, adjacent to the origin of the left posterior communicating artery and measuring 4 x 2.3 mm with a tiny bleb at its apex.  There is no evidence of arteriovenous shunting or venous sinus stenosis to explain patient's pulsatile tinnitus. She still has some mild ringing in her left ear intermittently and mild intermittent headaches. She was started on aspirin 325 mg daily and Plavix 75 mg daily and aspirin level was 518 on 6/9 and and P2Y12 was 68 on 6/9/20. Patient underwent stent assist coiling of LICA aneurysm today. She tolerated well. Physical Exam:  Gen: NAD, calm, cooperative  Neuro: A&Ox4. Follows commands. Speech clear. Affect normal. PERRL, 3 mm bilaterally. EOMI. Face symmetric. Palate symmetric. Tongue midline. Irina spontaneously. Strength 5/5 in UE and LE BL. Negative drift. Gait deferred. Skin: Warm, dry, color appropriate for ethnicity. Left groin arteriotomy site soft and non-tender on palpation, dressing saturated with blood but no active bleeding noted but no blood noted on tegaderm. Left groin soft, no hematoma or bruising noted. Positive pedal pulses bilaterally. PLAN:   -SBP goal   -IVF NS 75ml/hour  -Another 325 mg of aspirin was given this evening. Will recheck aspirin level in am. P2Y12 was 45.  Will recheck in am.   -q hourly neuro checks. Addendum: 3936 am. Left groin now with ecchymosis. No active bleeding or hematoma noted. Neuro intact.      Alley Rolle NP  Neurocritical Care Nurse Practitioner  [phone]

## 2020-06-16 NOTE — DISCHARGE SUMMARY
Neurointerventional Discharge Summary    Patient: Henry Tristan MRN: [id number]  SSN: [id number]    YOB: 1960  Age: 56 y.o. Sex: female      DATE OF ADMISSION: 6/15/2020    DATE OF DISCHARGE: 6/16/2020    DISCHARGING PHYSICIAN: Royal Boykin. Hoang Lao MD    PROCEDURES/SURGERIES: Procedure(s) with comments:     NEUROINTERVENTIONAL SURGERY POST-PROCEDURE NOTE     PROCEDURE:  Cerebral angiogram and stent-assisted coil embolization of left ICA aneurysm     VESSEL(S) STUDIED:  1. Left ICA  2. Left CCA VESSEL(S) TREATED:  1. Left ICA         PRELIMINARY REPORT & DISPOSITION:   Irregular left ICA aneurysm again noted. Status post stent-assisted coil embolization of left ICA aneurysm using a 4 x 21 mm Neuroform Fertile stent and three detachable aneurysm coils, resulting in complete occlusion of the aneurysm sac. Good flow in left ICA and large left PCOM.     COMPLICATIONS:  Vasospasm or mild non-flow limiting dissection of cervical left ICA     FOLLOW-UP:  Admit to ICU  SBP   Continue DAPT  Check aspirin test and P2Y12 DATE OF SERVICE:  6/15/2020 12:12 PM      ATTENDING SURGEON(S):  MD Javy Morel MD     ANESTHESIA:   GA     MEDICATIONS:   See nursing record     PUNCTURE SITE:  Left common femoral artery. Arteriotomy closed with AngioSeal.  Flat with leg straight x 2 hours.            ADMITTING DIAGNOSES & HOSPITAL COURSE:     Pt was admitted overnight for scheduled elective endovascular cerebral angiogram with irregular left ICA aneurysm s/p stent assisted coil embolization. The procedure was performed in the Interventional Radiology suite under general anesthesia. The patient tolerated the procedure well without complications as detailed above. Following extubation, patient was transferred to ICU where she was fully recovered and returned to neurological baseline. She was admitted overnight for continued monitoring due to potential risks of stroke and thrombosis.  Her hospital course was uneventful and she was discharge to home the following day in stable condition. PENDING TEST RESULTS:   At the time of discharge the following test results are still pending: None    FOLLOW UP APPOINTMENTS:   Follow-up Information     Follow up With Specialties Details Why MD [facility] In 7 days As needed, If symptoms worsen [address] [facility]  [address] [facility]      Nu Aragon MD   On 6/30/2020 This will be a virtual appointment scheduled for 10 AM. Our  will call you 15 minutes prior to get you ready for the appointment. [facility], [address]            ADDITIONAL CARE RECOMMENDATIONS:    Patient to avoid tub baths, swimming or hot tubs for one week. May take showers and pat the site dry. Do NOT skip any doses of aspirin and plavix. Take these daily every day to prevent stroke related to stent placement. ACTIVITY: Do not lift anything over 10 lbs for 10-14 days. Walking and stairs permissible. May resume all physical activities as tolerated after cleared at follow up appointment with Dr. Kathy Schlatter. WOUND CARE: Monitor for signs and sx of infection including fever, expanding inflammation and discolored drainage. Report any changes in temperature in affected extremity, numbness, weakness or hematoma. If the groin puncture site starts bleeding, please apply HARD direct pressure over the incision site for 10-20 minutes. If it does not stop bleeding, please call 911 or return to the emergency room. Physical Exam:  GENERAL: alert, cooperative, no distress, appears stated age  EYE: conjunctivae/corneas clear. PERRL, EOM's intact. NECK: neck supple and symmetrical.  no carotid bruits  LUNG: clear to auscultation bilaterally  HEART: regular rate and rhythm, S1, S2 normal, no murmur, click, rub or gallop  ABDOMEN: soft, non-tender.  Bowel sounds normal. No masses,  no organomegaly  EXTREMITI ES:  extremities normal, atraumatic, no cyanosis or edema. 2+ distal pulses bilaterally. SKIN: Warm to touch. Appropriate for ethnicity. No rashes noted.         Neurologic Exam:  Mental Status:             Alert and oriented x 4. Appropriate affect, mood and behavior.        Language:                   Normal fluency, repetition, comprehension and naming.     Cranial Nerves:                                                 Pupils equal, round and reactive to light. Visual fields full to confrontation. Extraocular movements intact. Facial sensation intact. Full facial strength, no asymmetry. Hearing grossly intact bilaterally. No dysarthria. Tongue protrudes to midline, palate elevates symmetrically.                                            Motor:                          No pronator drift. Bulk and tone normal.                                       5/5 power in all extremities proximally and distally. No involuntary movements.     Sensation:                   Sensation intact throughout to light touch. No neglect.     Reflexes:                     Deferred.     Coordination & Gait:   No ataxia with FTN and HTS bilaterally. Gait deferred. DISCHARGE MEDICATIONS:   See Medication Reconciliation Form    · It is important that you take the medication exactly as they are prescribed. · Keep your medication in the bottles provided by the pharmacist and keep a list of the medication names, dosages, and times to be taken in your wallet. · Do not take other medications without consulting your doctor.        NOTIFY YOUR PHYSICIAN FOR ANY OF THE FOLLOWING:   Fever over 101 degrees for 24 hours. Chest pain, shortness of breath, fever, chills, nausea, vomiting, diarrhea, change in mentation, falling, weakness, bleeding. Severe pain or pain not relieved by medications. Or, any other signs or symptoms that you may have questions about. DISPOSITION:   X Home With: none   OT  PT  HH  RN       SNF/Inpatient Rehab/LTAC    Independent/assisted living    Hospice    Other:     CDMP Checked: Yes X     PROBLEM LIST Updated:   Yes X

## 2020-06-16 NOTE — PROGRESS NOTES
Bedside and Verbal shift change report given to Debbi Vega  (oncoming nurse) by Kenyatta Angelo (offgoing nurse). Report included the following information SBAR, Procedure Summary and Cardiac Rhythm SB/NSR. Rec'd patient in bed resting. A/O x4 skin warm noted bruising at around L groin site gauze dry bloody. Clear drsg in place. C/O pain at L groin site. Resting . + pluses noted throughout. OOB sitting in recliner  No C/O voiced        Order rec'd, Orland DC'd, IV's DC\"d. Patient ambulated in hallway with Natalee Raman. Tolerated walking appeared in no distress. .    Left groin drsg changed     1240 Discharge information given to patient. Discharged via ambulating with tech .

## 2020-06-17 ENCOUNTER — PATIENT OUTREACH (OUTPATIENT)
Dept: INTERNAL MEDICINE CLINIC | Age: 60
End: 2020-06-17

## 2020-06-17 NOTE — PROGRESS NOTES
Patient was admitted (planned admission) to [facility] on 6/15/2020 and discharged on 6/16/2020 (29 hours) for cerebral aneurysm. Patient was contacted within 1 business days of discharge. (unsuccess contacts when attempting to reach patient)    Top Discharge Challenges to be reviewed by the provider   cerebral aneurysm s/p planned coil embolization  - Dr. Elizabeth Perez f/u 7/6  - unable to reach for [facility] call   Discussed COVID-19 related testing which was not done at this time. Test results were not done. Patient informed of results, if available? n/a  Method of communication with provider : chart routing       Advance Care Planning:   Does patient have an Advance Directive:  not on file     Inpatient Readmission Risk score: n/a  Was this a readmission? no   Patient stated reason for the admission: n/a  Patients top risk factors for readmission: medical condition, multiple health system providers and utilization of servicesunable to reach patient for [facility] call       Home Health/Outpatient orders at discharge: none    Durable Medical Equipment ordered at discharge: none      [facility] follow up appointment(s):   Future Appointments   Date Time Provider Curtis Barrera   7/6/2020 11:00 AM Dereck Sheriff MD [address]     06/24/20  Unable to reach patient for [facility] call. My chart message sent. CTN to attempt to reach patient in 2 business days.  AR

## 2020-06-20 ENCOUNTER — TELEPHONE (OUTPATIENT)
Dept: NEUROSURGERY | Age: 60
End: 2020-06-20

## 2020-06-20 NOTE — TELEPHONE ENCOUNTER
I called patient to discuss the bruising at her groin puncture site. She stated that the incision is well healed and that she noticed the bruising a couple of days after the procedure. The bruising covers an area of approximately two-hands worth, and extends to her inner thigh and pelvic regions. She denies associated swelling or pulsatile mass in groin region. She also does not think that the bruising is growing. I advised her to jay the edges of the bruising with a marker. If she is concerned about the bruising or if it is getting worse, she should seek emergent medical care. I asked her to call our office either way on Monday to let us know. She can also come in to the office to get evaluated. Patient also complained of a left arm achiness that started a couple of days after discharge. She questioned if it could be due to the IV that she previously had in that arm. I do not think it is related or has any association with her hospital stay or procedure. It sounds like a muscular pain and she doesn't have significant cardiac risk factors, but I advised her to seek emergent medical care if she is concerned about it.

## 2020-06-22 ENCOUNTER — TELEPHONE (OUTPATIENT)
Dept: NEUROSURGERY | Age: 60
End: 2020-06-22

## 2020-06-22 DIAGNOSIS — I67.1 CEREBRAL ANEURYSM: Primary | ICD-10-CM

## 2020-06-22 NOTE — TELEPHONE ENCOUNTER
----- Message from Anthony Galvez MD sent at 6/20/2020  1:18 PM EDT -----  Regarding: follow up  [id number] Matt Narayan,  Please follow up with Mrs. Gregory on Monday morning. I would've liked for her to come in on Friday to be evaluated but I didn't see your message until late. Please bring something like this to my attention when you find out about it. I called her today and asked her to jay the edges of the bruising. I also advised her to go to the ED if she is concerned about it or if she notices that it is growing. We should just follow up on Monday to make sure she is doing okay and offer for her to come in if she is concerned. Thanks!

## 2020-06-22 NOTE — TELEPHONE ENCOUNTER
Spoke to patient this morning to check on bruising from procedure last week. Patient reports no increase from the markings around bruise. Denies pain, warmth or drainage from site. No new issues reported. Patient scheduled for in office visit today.

## 2020-06-23 ENCOUNTER — DOCUMENTATION ONLY (OUTPATIENT)
Dept: NEUROSURGERY | Age: 60
End: 2020-06-23

## 2020-06-23 NOTE — PROGRESS NOTES
I briefly evaluated the patient yesterday in clinic. The prior right groin puncture site is well-healed. The left groin puncture incision site is also well healed. However, there is extensive bruising extending from the left groin to the left hip area, anterior thigh, and inner thigh. There is no swelling or hematoma. No pulsatile masses identified. I advised her to continue to monitor the area of ecchymosis. She had already marked the edges with a marker, and it had not grown in the interval. I expect that it will resolve in the coming weeks. We also reviewed her imaging. I answered all of her questions. We will obtain a CTA of the Head and Neck next week.

## 2020-06-29 NOTE — TELEPHONE ENCOUNTER
CLOPIDOGREL Patient is needing 90 day supply or three refills. Middle of brain really sharp pain felt like something popped then it went away. Patient is stating that she is now having light headaches.

## 2020-07-01 ENCOUNTER — HOSPITAL ENCOUNTER (OUTPATIENT)
Dept: CT IMAGING | Age: 60
Discharge: HOME | End: 2020-07-01
Attending: RADIOLOGY
Payer: COMMERCIAL

## 2020-07-01 ENCOUNTER — PATIENT OUTREACH (OUTPATIENT)
Dept: INTERNAL MEDICINE CLINIC | Age: 60
End: 2020-07-01

## 2020-07-01 DIAGNOSIS — I67.1 CEREBRAL ANEURYSM: ICD-10-CM

## 2020-07-01 PROCEDURE — 74011636320 HC RX REV CODE- 636/320: Performed by: RADIOLOGY

## 2020-07-01 PROCEDURE — 70498 CT ANGIOGRAPHY NECK: CPT

## 2020-07-01 RX ORDER — SODIUM CHLORIDE 0.9 % (FLUSH) 0.9 %
10 SYRINGE (ML) INJECTION
Status: COMPLETED | OUTPATIENT
Start: 2020-07-01 | End: 2020-07-01

## 2020-07-01 RX ADMIN — Medication 10 ML: at 09:37

## 2020-07-01 RX ADMIN — IOPAMIDOL 100 ML: 755 INJECTION, SOLUTION INTRAVENOUS at 09:37

## 2020-07-06 ENCOUNTER — VIRTUAL VISIT (OUTPATIENT)
Dept: NEUROSURGERY | Age: 60
End: 2020-07-06

## 2020-07-06 DIAGNOSIS — H93.A2 PULSATILE TINNITUS OF LEFT EAR: Primary | ICD-10-CM

## 2020-07-06 DIAGNOSIS — I67.1 CEREBRAL ANEURYSM, NONRUPTURED: ICD-10-CM

## 2020-07-06 DIAGNOSIS — I67.1 CEREBRAL ANEURYSM: ICD-10-CM

## 2020-07-06 NOTE — PROGRESS NOTES
Phone call to patient in preparation for Virtual visit with provider. Name and  verified. Follow up for imaging results for cerebral aneurysm. Patient reports bruising on right groin and thight have healed. No acute problems from angiogram.  Reports a dull headache or pressure at the front of her head on occasion. Stated headaches are nothing she needs to do anything about.   Denies dizziness,

## 2020-07-06 NOTE — PATIENT INSTRUCTIONS
A Healthy Lifestyle: Care Instructions Your Care Instructions A healthy lifestyle can help you feel good, stay at a healthy weight, and have plenty of energy for both work and play. A healthy lifestyle is something you can share with your whole family. A healthy lifestyle also can lower your risk for serious health problems, such as high blood pressure, heart disease, and diabetes. You can follow a few steps listed below to improve your health and the health of your family. Follow-up care is a key part of your treatment and safety. Be sure to make and go to all appointments, and call your doctor if you are having problems. It's also a good idea to know your test results and keep a list of the medicines you take. How can you care for yourself at home? · Do not eat too much sugar, fat, or fast foods. You can still have dessert and treats now and then. The goal is moderation. · Start small to improve your eating habits. Pay attention to portion sizes, drink less juice and soda pop, and eat more fruits and vegetables. ? Eat a healthy amount of food. A 3-ounce serving of meat, for example, is about the size of a deck of cards. Fill the rest of your plate with vegetables and whole grains. ? Limit the amount of soda and sports drinks you have every day. Drink more water when you are thirsty. ? Eat at least 5 servings of fruits and vegetables every day. It may seem like a lot, but it is not hard to reach this goal. A serving or helping is 1 piece of fruit, 1 cup of vegetables, or 2 cups of leafy, raw vegetables. Have an apple or some carrot sticks as an afternoon snack instead of a candy bar. Try to have fruits and/or vegetables at every meal. 
· Make exercise part of your daily routine. You may want to start with simple activities, such as walking, bicycling, or slow swimming. Try to be active 30 to 60 minutes every day.  You do not need to do all 30 to 60 minutes all at once. For example, you can exercise 3 times a day for 10 or 20 minutes. Moderate exercise is safe for most people, but it is always a good idea to talk to your doctor before starting an exercise program. 
· Keep moving. Jonathan Lindsayjaime the lawn, work in the garden, or [employer]. Take the stairs instead of the elevator at work. · If you smoke, quit. People who smoke have an increased risk for heart attack, stroke, cancer, and other lung illnesses. Quitting is hard, but there are ways to boost your chance of quitting tobacco for good. ? Use nicotine gum, patches, or lozenges. ? Ask your doctor about stop-smoking programs and medicines. ? Keep trying. In addition to reducing your risk of diseases in the future, you will notice some benefits soon after you stop using tobacco. If you have shortness of breath or asthma symptoms, they will likely get better within a few weeks after you quit. · Limit how much alcohol you drink. Moderate amounts of alcohol (up to 2 drinks a day for men, 1 drink a day for women) are okay. But drinking too much can lead to liver problems, high blood pressure, and other health problems. Family health If you have a family, there are many things you can do together to improve your health. · Eat meals together as a family as often as possible. · Eat healthy foods. This includes fruits, vegetables, lean meats and dairy, and whole grains. · Include your family in your fitness plan. Most people think of activities such as jogging or tennis as the way to fitness, but there are many ways you and your family can be more active. Anything that makes you breathe hard and gets your heart pumping is exercise. Here are some tips: 
? Walk to do errands or to take your child to school or the bus. 
? Go for a family bike ride after dinner instead of watching TV. Where can you learn more? Go to http://efrain-virgen.info/ Enter T078 in the search box to learn more about \"A Healthy Lifestyle: Care Instructions. \" Current as of: January 31, 2020               Content Version: 12.5 © 3782-6399 [employer]wise[employer]. Care instructions adapted under license by [employer] (which disclaims liability or warranty for this information). If you have questions about a medical condition or this instruction, always ask your healthcare professional. [place] [address] any warranty or liability for your use of this information.

## 2020-07-08 ENCOUNTER — OFFICE VISIT (OUTPATIENT)
Dept: PRIMARY CARE CLINIC | Age: 60
End: 2020-07-08

## 2020-07-08 VITALS
RESPIRATION RATE: 16 BRPM | HEART RATE: 72 BPM | WEIGHT: 110.6 LBS | TEMPERATURE: 98.6 F | OXYGEN SATURATION: 98 % | DIASTOLIC BLOOD PRESSURE: 58 MMHG | HEIGHT: 62 IN | BODY MASS INDEX: 20.35 KG/M2 | SYSTOLIC BLOOD PRESSURE: 101 MMHG

## 2020-07-08 DIAGNOSIS — N30.01 ACUTE CYSTITIS WITH HEMATURIA: Primary | ICD-10-CM

## 2020-07-08 DIAGNOSIS — R35.0 FREQUENCY OF URINATION: ICD-10-CM

## 2020-07-08 LAB
BILIRUB UR QL STRIP: NEGATIVE
GLUCOSE UR-MCNC: NEGATIVE MG/DL
KETONES P FAST UR STRIP-MCNC: NEGATIVE MG/DL
PH UR STRIP: 6.5 [PH] (ref 4.6–8)
PROT UR QL STRIP: NEGATIVE
SP GR UR STRIP: 1.01 (ref 1–1.03)
UA UROBILINOGEN AMB POC: NORMAL (ref 0.2–1)
URINALYSIS CLARITY POC: NORMAL
URINALYSIS COLOR POC: NORMAL
URINE BLOOD POC: NORMAL
URINE LEUKOCYTES POC: NORMAL
URINE NITRITES POC: NEGATIVE

## 2020-07-08 RX ORDER — CEPHALEXIN 500 MG/1
500 CAPSULE ORAL 2 TIMES DAILY
Qty: 14 CAP | Refills: 0 | Status: SHIPPED | OUTPATIENT
Start: 2020-07-08 | End: 2020-07-15

## 2020-07-08 RX ORDER — MOMETASONE FUROATE 1 MG/G
OINTMENT TOPICAL
COMMUNITY
End: 2021-02-02 | Stop reason: ALTCHOICE

## 2020-07-08 RX ORDER — ASPIRIN 325 MG
TABLET ORAL
COMMUNITY
End: 2020-07-08 | Stop reason: SDUPTHER

## 2020-07-08 NOTE — PROGRESS NOTES
Chief Complaint   Patient presents with    Urinary Frequency     Patient in room #5 for follow up of UTI from last week.  Still with frequency and right lower abdominal pain

## 2020-07-08 NOTE — PROGRESS NOTES
Subjective:     Tayler Garcia is a 56 y.o. female who complains of urinary frequency for a few days. Today had a \"twinge\" of right suprapubic pain which has resolved. Pt was treated for UTI last week with 5 days of Macrobid - no improvement. She's been dealing with anal fissures and vaginal irritation for several weeks and is quite frustrated that she continues to have discomfort. In addition she had repair of cerebral aneurysm last month and is currently on ASA and Plavix. Patient denies flank pain, vomiting, fever, unusual vaginal discharge. Patient does not have a history of recurrent UTI. Patient does not have a history of pyelonephritis. Past Medical History:   Diagnosis Date    Aneurysm ([facility] [address] 06/15/2020    Coiled and stented    Anxiety     Anxiety     At risk for malignant hyperthermia     MH PRECAUTIONS    Endocrine disease     hypothyroid    Gallbladder polyp     Neurological disorder     Thyroid disease      Past Surgical History:   Procedure Laterality Date    HX GYN      HX OVARIAN CYST REMOVAL      HX OVARIAN CYST REMOVAL Right      Allergies   Allergen Reactions    Acyclovir Nausea and Vomiting    Morphine Nausea and Vomiting    Pseudoephedrine Hcl Unknown (comments)          Review of Systems  Pertinent items are noted in HPI. Objective:     Visit Vitals  /58   Pulse 72   Temp 98.6 °F (37 °C) (Oral)   Resp 16   Ht 5' 2\" (1.575 m)   Wt 110 lb 9.6 oz (50.2 kg)   SpO2 98%   BMI 20.23 kg/m²     General:  alert, cooperative, no distress, anxious   Abdomen: soft, nontender, nondistended, no masses or organomegaly.     Back:  CVA tenderness absent   :  defer exam     Laboratory:   Urine dipstick shows   Results for orders placed or performed in visit on 07/08/20   AMB POC URINALYSIS DIP STICK MANUAL W/O MICRO   Result Value Ref Range    Color (UA POC) Light Yellow     Clarity (UA POC) Slightly Cloudy     Glucose (UA POC) Negative Negative    Bilirubin (UA POC) Negative Negative Ketones (UA POC) Negative Negative    Specific gravity (UA POC) 1.015 1.001 - 1.035    Blood (UA POC) Trace Negative    pH (UA POC) 6.5 4.6 - 8.0    Protein (UA POC) Negative Negative    Urobilinogen (UA POC) 0.2 mg/dL 0.2 - 1    Nitrites (UA POC) Negative Negative    Leukocyte esterase (UA POC) 3+ Negative           Assessment/Plan:       ICD-10-CM ICD-9-CM    1. Frequency of urination R35.0 788.41 AMB POC URINALYSIS DIP STICK MANUAL W/O MICRO      CULTURE, URINE          1. Keflex pending urine culture  2. Maintain adequate hydration  3. Follow up if symptoms not improving, and prn. Jessika Quintanilla NP  This note will not be viewable in [address]

## 2020-07-08 NOTE — PATIENT INSTRUCTIONS
Frequent Urination: Care Instructions  Your Care Instructions  An urge to urinate frequently but usually passing only small amounts of urine is a common symptom of urinary problems, such as urinary tract infections. The bladder may become inflamed. This can cause the urge to urinate. You may try to urinate more often than usual to try to soothe that urge. Frequent urination also may be caused by sexually transmitted infections (STIs) or kidney stones. Or it may happen when something irritates the tube that carries urine from the bladder to the outside of the body (urethra). It may also be a sign of diabetes. The cause may be hard to find. You may need tests. Follow-up care is a key part of your treatment and safety. Be sure to make and go to all appointments, and call your doctor if you are having problems. It's also a good idea to know your test results and keep a list of the medicines you take. How can you care for yourself at home? · Drink extra water for the next day or two. This will help make the urine less concentrated. (If you have kidney, heart, or liver disease and have to limit fluids, talk with your doctor before you increase the amount of fluids you drink.)  · Avoid drinks that are carbonated or have caffeine. They can irritate the bladder. For women:  · Urinate right after you have sex. · After you go to the bathroom, wipe from front to back. · Avoid douches, bubble baths, and feminine hygiene sprays. And avoid other feminine hygiene products that have deodorants. When should you call for help? Call your doctor now or seek immediate medical care if:  · You have new symptoms, such as fever, nausea, or vomiting. · You have new or worse symptoms of a urinary problem. For example:  ? You have blood or pus in your urine. ? You have chills or body aches. ? It hurts to urinate. ? You have groin or belly pain. ? You have pain in your back just below your rib cage (the flank area).   Watch closely for changes in your health, and be sure to contact your doctor if you feel thirstier than usual.  Where can you learn more? Go to http://efrain-virgen.info/  Enter I431 in the search box to learn more about \"Frequent Urination: Care Instructions. \"  Current as of: August 22, 2019               Content Version: 12.5  © 1777-7181 [employer]. Care instructions adapted under license by [employer] (which disclaims liability or warranty for this information). If you have questions about a medical condition or this instruction, always ask your healthcare professional. [address] any warranty or liability for your use of this information.

## 2020-07-08 NOTE — PROGRESS NOTES
Neurointerventional Surgery Clinic Note    Johanna Helm is a 56 y.o. female who was seen by synchronous (real-time) audio-video technology on 7/6/2020. Consent: Johanna Helm, who was seen by synchronous (real-time) audio-video technology, and/or her healthcare decision maker, is aware that this patient-initiated, Telehealth encounter on 7/6/2020 is a billable service, with coverage as determined by her insurance carrier. She is aware that she may receive a bill and has provided verbal consent to proceed: Yes. Assessment & Plan:     56 y.o. 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 The proximal portion of this irregularity is improved compared to the cerebral angiogram on 6/15/2020, suggesting interval mild healing. Patient will continue on dual antiplatelet therapy for postprocedure therapy of the stent assisted coiling of her left ICA aneurysm anyways, and we will obtain a CTA of the head and neck in 3 months. Patient will be due for a follow-up cerebral angiogram in December of this year, and she would like to make sure that I could stent before the end of the year since she has already reached her deductible. The post procedure bruising in the left groin region has completely resolved. Plan:  -Continue dual antiplatelet therapy  -CTA head and neck in 3 months  -Diagnostic cerebral angiogram in December 2020 (6-month follow-up)    I spent at least 5 minutes on this visit with this established patient.     Subjective:   Johanna Helm is a 56 y.o. 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 Patient states that she has been doing well since we last talked. She denies headaches, but states that she has mild pressure in the front of her head occasionally. She denies weakness, numbness and tingling, dizziness, nausea and vomiting, problems with balance and coordination, and visual changes. She states that the left groin bruising has completely resolved. She has no pain in the area or problems with pain, weakness, or sensation changes in the legs. She continues to take aspirin and Plavix daily and reports some bruising when bumping into things. Prior to Admission medications    Medication Sig Start Date End Date Taking? Authorizing Provider   aspirin (ASPIRIN) 325 mg tablet Take 1 Tab by mouth daily. 6/2/20  Yes Gavin Ospina MD   clopidogreL (Plavix) 75 mg tab Take 1 Tab by mouth daily. 6/2/20  Yes Gavin Ospina MD   multivitamins-ca-iron-minerals (ONE DAILY WOMEN'S) 27-0.4 mg tab One Daily Women's 27 mg-0.4 mg tablet   Prescribed by non [address] MD 8/1/06  Yes Provider, Historical   levothyroxine (SYNTHROID) 75 mcg tablet Take 1 Tab by mouth Every Mon, Wed & Sun. 10/21/18  Yes Appanatoly Lazcano MD   levothyroxine (SYNTHROID) 50 mcg tablet TAKE 1 TABLET DAILY BEFORE BREAKFAST 1/9/20   AppAnamika Dickson MD   estradiol (ESTRACE) 0.01 % (0.1 mg/gram) vaginal cream Estrace 0.01% (0.1 mg/gram) vaginal cream   Insert 1/2 g into vagina 2 x per week    Provider, Historical   nitrofurantoin, macrocrystal-monohydrate, (MACROBID) 100 mg capsule Macrobid 100 mg capsule   Take 1 capsule every 12 hours by oral route for 7 days.     Provider, Historical   nystatin-triamcinolone (MYCOLOG) 100,000-0.1 unit/gram-% ointment nystatin-triamcinolone 100,000 unit/gram-0.1 % topical ointment   APPLY TO THE AFFECTED AREA(S) BY TOPICAL ROUTE 2 TIMES PER DAY as needed Provider, Historical   prasterone, dhea, (INTRAROSA) 6.5 mg inst Intrarosa 6.5 mg vaginal insert   Insert 1 vaginal insert every day by vaginal route. Provider, Historical   triamcinolone acetonide (KENALOG) 0.1 % topical cream  12/11/19   Provider, Historical   guaifenesin (MUCINEX PO) Take  by mouth. Provider, Historical   MULTIVITAMIN PO Take  by mouth. Provider, Historical     Allergies   Allergen Reactions    Acyclovir Nausea and Vomiting    Morphine Nausea and Vomiting    Pseudoephedrine Hcl Unknown (comments)       Patient Active Problem List   Diagnosis Code    Bronchitis J40    Gallbladder polyp K82.4    Non-intractable vomiting with nausea R11.2    Pulsatile tinnitus of left ear H93. A2    Cerebral aneurysm I67.1    Cerebral aneurysm, nonruptured I67.1     Patient Active Problem List    Diagnosis Date Noted    Cerebral aneurysm, nonruptured 06/15/2020    Cerebral aneurysm 06/02/2020    Pulsatile tinnitus of left ear 08/20/2019    Non-intractable vomiting with nausea 01/31/2018    Gallbladder polyp     Bronchitis 11/06/2015     Current Outpatient Medications   Medication Sig Dispense Refill    aspirin (ASPIRIN) 325 mg tablet Take 1 Tab by mouth daily. 30 Tab 3    clopidogreL (Plavix) 75 mg tab Take 1 Tab by mouth daily. 30 Tab 3    multivitamins-ca-iron-minerals (ONE DAILY WOMEN'S) 27-0.4 mg tab One Daily Women's 27 mg-0.4 mg tablet   Prescribed by non [address] Dean Bruno MD      levothyroxine (SYNTHROID) 75 mcg tablet Take 1 Tab by mouth Every Mon, Wed & Sun. 30 Tab 1    levothyroxine (SYNTHROID) 50 mcg tablet TAKE 1 TABLET DAILY BEFORE BREAKFAST 90 Tab 0    estradiol (ESTRACE) 0.01 % (0.1 mg/gram) vaginal cream Estrace 0.01% (0.1 mg/gram) vaginal cream   Insert 1/2 g into vagina 2 x per week      nitrofurantoin, macrocrystal-monohydrate, (MACROBID) 100 mg capsule Macrobid 100 mg capsule   Take 1 capsule every 12 hours by oral route for 7 days.       nystatin-triamcinolone (MYCOLOG) 100,000-0.1 unit/gram-% ointment nystatin-triamcinolone 100,000 unit/gram-0.1 % topical ointment   APPLY TO THE AFFECTED AREA(S) BY TOPICAL ROUTE 2 TIMES PER DAY as needed      prasterone, dhea, (INTRAROSA) 6.5 mg inst Intrarosa 6.5 mg vaginal insert   Insert 1 vaginal insert every day by vaginal route.  triamcinolone acetonide (KENALOG) 0.1 % topical cream       guaifenesin (MUCINEX PO) Take  by mouth.  MULTIVITAMIN PO Take  by mouth. Allergies   Allergen Reactions    Acyclovir Nausea and Vomiting    Morphine Nausea and Vomiting    Pseudoephedrine Hcl Unknown (comments)     Past Medical History:   Diagnosis Date    Anxiety     Anxiety     At risk for malignant hyperthermia     MH PRECAUTIONS    Endocrine disease     hypothyroid    Gallbladder polyp     Neurological disorder     Thyroid disease      Past Surgical History:   Procedure Laterality Date    HX GYN      HX OVARIAN CYST REMOVAL      HX OVARIAN CYST REMOVAL Right      Family History   Problem Relation Age of Onset    Hypertension Mother     Dementia Father     No Known Problems Sister     No Known Problems Brother     No Known Problems Maternal Aunt     No Known Problems Maternal Uncle     No Known Problems Paternal Aunt     No Known Problems Paternal Uncle     Diabetes Maternal Grandmother     Cancer Maternal Grandfather     Cancer Paternal Grandmother     No Known Problems Paternal Grandfather      Social History     Tobacco Use    Smoking status: Never Smoker    Smokeless tobacco: Never Used   Substance Use Topics    Alcohol use: Yes     Comment: rarealy       ROS: Pertinent ROS included in HPI    Objective:   Vital Signs: (As obtained by patient/caregiver at home)  There were no vitals taken for this visit.        Constitutional: [x] Appears well-developed and well-nourished [x] No apparent distress      [] Abnormal -     Mental status: [x] Alert and awake  [x] Oriented to person/place/time [x] Able to follow commands    [] Abnormal -     Eyes:   EOM    [x]  Normal    [] Abnormal -   Sclera  [x]  Normal    [] Abnormal -          Discharge [x]  None visible   [] Abnormal -     HENT: [x] Normocephalic, atraumatic  [] Abnormal -   [x] Mouth/Throat: Mucous membranes are moist    External Ears [x] Normal  [] Abnormal -    Neck: [x] No visualized mass [] Abnormal -     Pulmonary/Chest: [x] Respiratory effort normal   [x] No visualized signs of difficulty breathing or respiratory distress        [] Abnormal -      Musculoskeletal:   [] Normal gait with no signs of ataxia         [x] Normal range of motion of neck        [] Abnormal -     Neurological:        [x] No Facial Asymmetry (Cranial nerve 7 motor function) (limited exam due to video visit)          [x] No gaze palsy        [] Abnormal -          Skin:        [x] No significant exanthematous lesions or discoloration noted on facial skin         [] Abnormal -            Psychiatric:       [x] Normal Affect [] Abnormal -        [x] No Hallucinations    Other pertinent observable physical exam findings:-    Neurological exam not performed. We discussed the expected course, resolution and complications of the diagnosis(es) in detail. Medication risks, benefits, costs, interactions, and alternatives were discussed as indicated. I advised her to contact the office if her condition worsens, changes or fails to improve as anticipated. She expressed understanding with the diagnosis(es) and plan. Reg Valentin is a 56 y.o. female who was evaluated by a video visit encounter for concerns as above. Patient identification was verified prior to start of the visit. A caregiver was present when appropriate. Due to this being a TeleHealth encounter (During OHIXL-10 public health emergency), evaluation of the following organ systems was limited: Vitals/Constitutional/EENT/Resp/CV/GI//MS/Neuro/Skin/Heme-Lymph-Imm.   Pursuant to the emergency declaration under the [employer] and the [address] authority and the "[employer] and [employer] Act, this Virtual  Visit was conducted, with patient's (and/or legal guardian's) consent, to reduce the patient's risk of exposure to COVID-19 and provide necessary medical care. Services were provided through a video synchronous discussion virtually to substitute for in-person clinic visit. Patient was located at home and provider was located in office. This visit was completed virtually using Doxy. cecilia Egan MD

## 2020-07-09 RX ORDER — CLOPIDOGREL BISULFATE 75 MG/1
75 TABLET ORAL DAILY
Qty: 30 TAB | Refills: 3 | OUTPATIENT
Start: 2020-07-09

## 2020-07-10 LAB — BACTERIA UR CULT: ABNORMAL

## 2020-07-10 NOTE — PROGRESS NOTES
On keflex which is appropriate antibiotic. Called pt and reviewed results with her by phone. Urinary frequency has improved. F/u prn.

## 2020-08-09 DIAGNOSIS — E03.9 ACQUIRED HYPOTHYROIDISM: ICD-10-CM

## 2020-08-09 RX ORDER — LEVOTHYROXINE SODIUM 50 UG/1
TABLET ORAL
Qty: 90 TAB | Refills: 1 | OUTPATIENT
Start: 2020-08-09

## 2020-08-09 NOTE — TELEPHONE ENCOUNTER
Once appointment made I heena refill medication last seen in march 2019   Must be seen every 6 months for TSH check and refills

## 2020-09-23 DIAGNOSIS — I67.1 CEREBRAL ANEURYSM, NONRUPTURED: Primary | ICD-10-CM

## 2020-09-23 RX ORDER — CLOPIDOGREL BISULFATE 75 MG/1
75 TABLET ORAL DAILY
Qty: 30 TAB | Refills: 3 | Status: SHIPPED | OUTPATIENT
Start: 2020-09-23 | End: 2020-12-17 | Stop reason: ALTCHOICE

## 2020-09-23 RX ORDER — CLOPIDOGREL BISULFATE 75 MG/1
75 TABLET ORAL DAILY
Qty: 30 TAB | Refills: 3 | Status: SHIPPED | OUTPATIENT
Start: 2020-09-23 | End: 2020-09-23 | Stop reason: SDUPTHER

## 2020-09-24 ENCOUNTER — TELEPHONE (OUTPATIENT)
Dept: NEUROSURGERY | Age: 60
End: 2020-09-24

## 2020-09-24 NOTE — TELEPHONE ENCOUNTER
Spoke to patient and relayed the message from provider regarding her plavix and aspirin. See his note. Reminded patient of her 6 month repeat diagnostic in December and CTA due in November. Patient stated understanding. Patient reported she continues with bruising and slow healing. Reports she read on the Internet that calcium is not absorbed well when taking plavix and it could cause hair loss. Informed patient I would notify provider of her concerns.

## 2020-09-24 NOTE — TELEPHONE ENCOUNTER
----- Message from Cora Egan MD sent at 9/23/2020  3:16 PM EDT -----  Regarding: RE: Patient Query re: Plavix  Contact: [phone]  She will need to be on dual antiplatelet therapy for 6 months total. She has a 6 month post-procedure follow up diagnostic angiogram which is to be performed on December of this year. I will instruct her as to her aspirin and plavix regimen thereafter. (She also has a CTA which is to be done in the next month. This will not change anything with her aspirin and Plavix.)     Zechariah Yeung, can you please call Mrs. Sarai Monterroso and let her know above. Thanks.  ----- Message -----  From: Chyna Rubio RN  Sent: 9/23/2020   1:25 PM EDT  To: Cora Egan MD  Subject: Patient Query re: Plavix                         Randolph Linker called regarding her Plavix prescription. She has two weeks supply and has received a message from [facility] that she needs to  new prescription refill. She is inquiring how long she needs to be on the Plavix and does she really need to  the prescription. She desires a call-back at [phone].

## 2020-10-07 ENCOUNTER — HOSPITAL ENCOUNTER (OUTPATIENT)
Dept: CT IMAGING | Age: 60
Discharge: HOME | End: 2020-10-07
Attending: RADIOLOGY
Payer: COMMERCIAL

## 2020-10-07 DIAGNOSIS — I67.1 CEREBRAL ANEURYSM, NONRUPTURED: ICD-10-CM

## 2020-10-07 PROCEDURE — 74011000636 HC RX REV CODE- 636: Performed by: RADIOLOGY

## 2020-10-07 PROCEDURE — 70498 CT ANGIOGRAPHY NECK: CPT

## 2020-10-07 RX ORDER — SODIUM CHLORIDE 0.9 % (FLUSH) 0.9 %
10 SYRINGE (ML) INJECTION
Status: COMPLETED | OUTPATIENT
Start: 2020-10-07 | End: 2020-10-07

## 2020-10-07 RX ADMIN — IOPAMIDOL 100 ML: 755 INJECTION, SOLUTION INTRAVENOUS at 17:14

## 2020-10-07 RX ADMIN — Medication 10 ML: at 17:14

## 2020-11-02 ENCOUNTER — VIRTUAL VISIT (OUTPATIENT)
Dept: NEUROSURGERY | Age: 60
End: 2020-11-02
Payer: COMMERCIAL

## 2020-11-02 ENCOUNTER — TELEPHONE (OUTPATIENT)
Dept: NEUROSURGERY | Age: 60
End: 2020-11-02

## 2020-11-02 DIAGNOSIS — I67.1 CEREBRAL ANEURYSM: Primary | ICD-10-CM

## 2020-11-02 PROCEDURE — 99212 OFFICE O/P EST SF 10 MIN: CPT | Performed by: RADIOLOGY

## 2020-11-02 NOTE — PROGRESS NOTES
Neurointerventional Surgery Clinic Note    Suraj Perez is a 56 y.o. female who was seen by synchronous (real-time) audio-video technology on 11/2/2020. Consent: Suraj Perez, who was seen by synchronous (real-time) audio-video technology, and/or her healthcare decision maker, is aware that this patient-initiated, Telehealth encounter on 11/2/2020 is a billable service, with coverage as determined by her insurance carrier. She is aware that she may receive a bill and has provided verbal consent to proceed: Yes. Assessment & Plan:   56 y.o. 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 The proximal portion of this irregularity is improved compared to the cerebral angiogram on 6/15/2020, suggesting interval mild healing. Follow-up CTA on 10/7/2020 demonstrated continued interval improvement. Patient will continue on dual antiplatelet therapy. She is due for a follow-up cerebral angiogram in December of this year. We will likely stop the Plavix after the angiogram, if everything looks good. She will continue full dose aspirin for another few months thereafter and then transition to low-dose aspirin.     Patient will continue doing physical therapy. If her left shoulder pain persists, she will seek further care via her PCP.     Plan:  -Continue dual antiplatelet therapy  -Diagnostic cerebral angiogram in December 2020 (6-month follow-up)    I spent at least 10 minutes on this visit with this established patient.     Subjective:   Suraj Perez is a 56 y.o. 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     Patient states that she has been doing well since we last talked. She denies headaches and new neurological symptoms. She has persistent left shoulder pain. She recently started seeing a physical therapist.  She has not seen her PCP. She continues to take aspirin and Plavix daily. Prior to Admission medications    Medication Sig Start Date End Date Taking? Authorizing Provider   clopidogreL (Plavix) 75 mg tab Take 1 Tab by mouth daily. 9/23/20  Yes Ofelia Avina MD   aspirin (ASPIRIN) 325 mg tablet Take 1 Tab by mouth daily. 6/2/20  Yes Ofelia Avina MD   multivitamins-ca-iron-minerals (ONE DAILY WOMEN'S) 27-0.4 mg tab One Daily Women's 27 mg-0.4 mg tablet   Prescribed by non [address] MD 8/1/06  Yes Provider, Historical   levothyroxine (SYNTHROID) 75 mcg tablet Take 1 Tab by mouth Every Mon, Wed & Sun. 10/21/18  Yes Syed Nunez MD   mometasone (ELOCON) 0.1 % ointment mometasone 0.1 % topical ointment   APPLY A THIN LAYER TO THE AFFECTED AREA(S) BY TOPICAL ROUTE ONCE DAILY    Provider, Historical   levothyroxine (SYNTHROID) 50 mcg tablet TAKE 1 TABLET DAILY BEFORE BREAKFAST 1/9/20   Anamika Posadas MD   estradiol (ESTRACE) 0.01 % (0.1 mg/gram) vaginal cream Estrace 0.01% (0.1 mg/gram) vaginal cream   Insert 1/2 g into vagina 2 x per week    Provider, Historical   nystatin-triamcinolone (MYCOLOG) 100,000-0.1 unit/gram-% ointment nystatin-triamcinolone 100,000 unit/gram-0.1 % topical ointment   APPLY TO THE AFFECTED AREA(S) BY TOPICAL ROUTE 2 TIMES PER DAY as needed    Provider, Historical   prasterone, dhea, (INTRAROSA) 6.5 mg inst Intrarosa 6.5 mg vaginal insert   Insert 1 vaginal insert every day by vaginal route.     Provider, Historical   triamcinolone acetonide (KENALOG) 0.1 % topical cream  12/11/19   Provider, Historical   MULTIVITAMIN PO Take by mouth. Provider, Historical     Allergies   Allergen Reactions    Acyclovir Nausea and Vomiting    Morphine Nausea and Vomiting    Pseudoephedrine Hcl Unknown (comments)       Patient Active Problem List   Diagnosis Code    Bronchitis J40    Gallbladder polyp K82.4    Non-intractable vomiting with nausea R11.2    Pulsatile tinnitus of left ear H93. A2    Cerebral aneurysm I67.1    Cerebral aneurysm, nonruptured I67.1     Patient Active Problem List    Diagnosis Date Noted    Cerebral aneurysm, nonruptured 06/15/2020    Cerebral aneurysm 06/02/2020    Pulsatile tinnitus of left ear 08/20/2019    Non-intractable vomiting with nausea 01/31/2018    Gallbladder polyp     Bronchitis 11/06/2015     Current Outpatient Medications   Medication Sig Dispense Refill    clopidogreL (Plavix) 75 mg tab Take 1 Tab by mouth daily. 30 Tab 3    aspirin (ASPIRIN) 325 mg tablet Take 1 Tab by mouth daily. 30 Tab 3    multivitamins-ca-iron-minerals (ONE DAILY WOMEN'S) 27-0.4 mg tab One Daily Women's 27 mg-0.4 mg tablet   Prescribed by non [address] Dean Bruno MD      levothyroxine (SYNTHROID) 75 mcg tablet Take 1 Tab by mouth Every Mon, Wed & Sun. 30 Tab 1    mometasone (ELOCON) 0.1 % ointment mometasone 0.1 % topical ointment   APPLY A THIN LAYER TO THE AFFECTED AREA(S) BY TOPICAL ROUTE ONCE DAILY      levothyroxine (SYNTHROID) 50 mcg tablet TAKE 1 TABLET DAILY BEFORE BREAKFAST 90 Tab 0    estradiol (ESTRACE) 0.01 % (0.1 mg/gram) vaginal cream Estrace 0.01% (0.1 mg/gram) vaginal cream   Insert 1/2 g into vagina 2 x per week      nystatin-triamcinolone (MYCOLOG) 100,000-0.1 unit/gram-% ointment nystatin-triamcinolone 100,000 unit/gram-0.1 % topical ointment   APPLY TO THE AFFECTED AREA(S) BY TOPICAL ROUTE 2 TIMES PER DAY as needed      prasterone, dhea, (INTRAROSA) 6.5 mg inst Intrarosa 6.5 mg vaginal insert   Insert 1 vaginal insert every day by vaginal route.       triamcinolone acetonide (KENALOG) 0.1 % topical cream  MULTIVITAMIN PO Take  by mouth. Allergies   Allergen Reactions    Acyclovir Nausea and Vomiting    Morphine Nausea and Vomiting    Pseudoephedrine Hcl Unknown (comments)     Past Medical History:   Diagnosis Date    Aneurysm ([address] 06/15/2020    Coiled and stented    Anxiety     Anxiety     At risk for malignant hyperthermia     MH PRECAUTIONS    Endocrine disease     hypothyroid    Gallbladder polyp     Neurological disorder     Thyroid disease      Past Surgical History:   Procedure Laterality Date    HX GYN      HX OVARIAN CYST REMOVAL      HX OVARIAN CYST REMOVAL Right      Family History   Problem Relation Age of Onset    Hypertension Mother     Dementia Father     No Known Problems Sister     No Known Problems Brother     No Known Problems Maternal Aunt     No Known Problems Maternal Uncle     No Known Problems Paternal Aunt     No Known Problems Paternal Uncle     Diabetes Maternal Grandmother     Cancer Maternal Grandfather     Cancer Paternal Grandmother     No Known Problems Paternal Grandfather      Social History     Tobacco Use    Smoking status: Never Smoker    Smokeless tobacco: Never Used   Substance Use Topics    Alcohol use: Yes     Comment: rarealy       ROS: Pertinent ROS included in HPI    Objective:   Vital Signs: (As obtained by patient/caregiver at home)  There were no vitals taken for this visit.        Constitutional: [x] Appears well-developed and well-nourished [x] No apparent distress      [] Abnormal -     Mental status: [x] Alert and awake  [x] Oriented to person/place/time [x] Able to follow commands    [] Abnormal -     Eyes:   EOM    [x]  Normal    [] Abnormal -   Sclera  [x]  Normal    [] Abnormal -          Discharge [x]  None visible   [] Abnormal -     HENT: [x] Normocephalic, atraumatic  [] Abnormal -   [x] Mouth/Throat: Mucous membranes are moist    External Ears [x] Normal  [] Abnormal -    Neck: [x] No visualized mass [] Abnormal - Pulmonary/Chest: [x] Respiratory effort normal   [x] No visualized signs of difficulty breathing or respiratory distress        [] Abnormal -      Musculoskeletal:   [x] Normal gait with no signs of ataxia         [x] Normal range of motion of neck        [] Abnormal -     Neurological:        [x] No Facial Asymmetry (Cranial nerve 7 motor function) (limited exam due to video visit)          [x] No gaze palsy        [] Abnormal -          Skin:        [x] No significant exanthematous lesions or discoloration noted on facial skin         [] Abnormal -            Psychiatric:       [x] Normal Affect [] Abnormal -        [x] No Hallucinations    Other pertinent observable physical exam findings:-    Neurologic Exam:  Mental Status:  Alert and oriented. Appropriate affect, mood and behavior. Language:    Normal fluency    Complete Neurologic exam and Physical exam not performed. We discussed the expected course, resolution and complications of the diagnosis(es) in detail. Medication risks, benefits, costs, interactions, and alternatives were discussed as indicated. I advised her to contact the office if her condition worsens, changes or fails to improve as anticipated. She expressed understanding with the diagnosis(es) and plan. Sonya Dugan is a 56 y.o. female who was evaluated by a video visit encounter for concerns as above. Patient identification was verified prior to start of the visit. A caregiver was present when appropriate. Due to this being a TeleHealth encounter ([facility]), evaluation of the following organ systems was limited: Vitals/Constitutional/EENT/Resp/CV/GI//MS/Neuro/Skin/Heme-Lymph-Imm.   Pursuant to the emergency declaration under the [facility] waiver authority and the [employer] and [employer] Act, this Virtual  Visit was conducted, with patient's (and/or legal guardian's) consent, to reduce the patient's risk of exposure to COVID-19 and provide necessary medical care. Services were provided through a video synchronous discussion virtually to substitute for in-person clinic visit. Patient and provider were located at their individual homes. This visit was completed virtually using Doxy. cecilia Wright MD

## 2020-11-02 NOTE — TELEPHONE ENCOUNTER
Message left for patient to confirm Diagnostic angiogram on 12/15/2020 with a  7:00 am arrival time. Return call to confirm.

## 2020-11-02 NOTE — PROGRESS NOTES
Phone call to patient in preparation for Virtual visit with provider. Name and  verified. Follow up for cerebral aneurysm imaging results. Patient denies headaches, dizziness, numbness and tingling,blurred or double vision and n/v. Patient reports continued left shoulder pain since angiogram.  Reports she is currently receiving physical therapy. No acute problems reported.

## 2020-11-02 NOTE — PATIENT INSTRUCTIONS
A Healthy Lifestyle: Care Instructions Your Care Instructions A healthy lifestyle can help you feel good, stay at a healthy weight, and have plenty of energy for both work and play. A healthy lifestyle is something you can share with your whole family. A healthy lifestyle also can lower your risk for serious health problems, such as high blood pressure, heart disease, and diabetes. You can follow a few steps listed below to improve your health and the health of your family. Follow-up care is a key part of your treatment and safety. Be sure to make and go to all appointments, and call your doctor if you are having problems. It's also a good idea to know your test results and keep a list of the medicines you take. How can you care for yourself at home? · Do not eat too much sugar, fat, or fast foods. You can still have dessert and treats now and then. The goal is moderation. · Start small to improve your eating habits. Pay attention to portion sizes, drink less juice and soda pop, and eat more fruits and vegetables. ? Eat a healthy amount of food. A 3-ounce serving of meat, for example, is about the size of a deck of cards. Fill the rest of your plate with vegetables and whole grains. ? Limit the amount of soda and sports drinks you have every day. Drink more water when you are thirsty. ? Eat at least 5 servings of fruits and vegetables every day. It may seem like a lot, but it is not hard to reach this goal. A serving or helping is 1 piece of fruit, 1 cup of vegetables, or 2 cups of leafy, raw vegetables. Have an apple or some carrot sticks as an afternoon snack instead of a candy bar. Try to have fruits and/or vegetables at every meal. 
· Make exercise part of your daily routine. You may want to start with simple activities, such as walking, bicycling, or slow swimming. Try to be active 30 to 60 minutes every day.  You do not need to do all 30 to 60 minutes all at once. For example, you can exercise 3 times a day for 10 or 20 minutes. Moderate exercise is safe for most people, but it is always a good idea to talk to your doctor before starting an exercise program. 
· Keep moving. [place] the lawn, work in the garden, or [employer]. Take the stairs instead of the elevator at work. · If you smoke, quit. People who smoke have an increased risk for heart attack, stroke, cancer, and other lung illnesses. Quitting is hard, but there are ways to boost your chance of quitting tobacco for good. ? Use nicotine gum, patches, or lozenges. ? Ask your doctor about stop-smoking programs and medicines. ? Keep trying. In addition to reducing your risk of diseases in the future, you will notice some benefits soon after you stop using tobacco. If you have shortness of breath or asthma symptoms, they will likely get better within a few weeks after you quit. · Limit how much alcohol you drink. Moderate amounts of alcohol (up to 2 drinks a day for men, 1 drink a day for women) are okay. But drinking too much can lead to liver problems, high blood pressure, and other health problems. Family health If you have a family, there are many things you can do together to improve your health. · Eat meals together as a family as often as possible. · Eat healthy foods. This includes fruits, vegetables, lean meats and dairy, and whole grains. · Include your family in your fitness plan. Most people think of activities such as jogging or tennis as the way to fitness, but there are many ways you and your family can be more active. Anything that makes you breathe hard and gets your heart pumping is exercise. Here are some tips: 
? Walk to do errands or to take your child to school or the bus. 
? Go for a family bike ride after dinner instead of watching TV. Where can you learn more? Go to http://www.[address].IndaBox/ Enter L029 in the search box to learn more about \"A Healthy Lifestyle: Care Instructions. \" Current as of: January 31, 2020               Content Version: 12.6 © 2733-3906 [employer]. Care instructions adapted under license by [employer] (which disclaims liability or warranty for this information). If you have questions about a medical condition or this instruction, always ask your healthcare professional. [place] [address] any warranty or liability for your use of this information.

## 2020-11-12 ENCOUNTER — TELEPHONE (OUTPATIENT)
Dept: INTERNAL MEDICINE CLINIC | Age: 60
End: 2020-11-12

## 2020-11-12 DIAGNOSIS — E03.9 ACQUIRED HYPOTHYROIDISM: ICD-10-CM

## 2020-11-12 RX ORDER — LEVOTHYROXINE SODIUM 50 UG/1
TABLET ORAL
Qty: 90 TAB | Refills: 0 | Status: SHIPPED | OUTPATIENT
Start: 2020-11-12 | End: 2020-11-13 | Stop reason: ALTCHOICE

## 2020-11-12 NOTE — TELEPHONE ENCOUNTER
[facility]    Phone Number:  [phone] (Call me)               General Message/Vendor Calls     Caller's first and last name: n/a       Reason for call: New refill prescription for Synthroid       Callback required yes/no and why:Yes       Best contact number(s):[phone]       Details to clarify the request: Patient would like speak to a doctor about getting a new refill prescription for Synthroid.  She is not sure if she needs to come in to see someone for that or not       Kendrick Morgan     Copy/Paste  ENVER[place]

## 2020-11-12 NOTE — TELEPHONE ENCOUNTER
Patient states she needs a call back to get her Levothyroxine/Synthroid medication Prescription corrected. Patient states ENT increased dosage to 75 mcg & she states she advised person she gave information to of this & also advised them she needed this to go thru her Mail Order & none of this shows documented. Please call to discuss.  Thank you

## 2020-11-13 RX ORDER — LEVOTHYROXINE SODIUM 75 UG/1
75 TABLET ORAL
Qty: 90 TAB | Refills: 1 | Status: SHIPPED | OUTPATIENT
Start: 2020-11-13 | End: 2021-05-10

## 2020-12-03 ENCOUNTER — TELEPHONE (OUTPATIENT)
Dept: NEUROSURGERY | Age: 60
End: 2020-12-03

## 2020-12-03 DIAGNOSIS — I72.5 ANEURYSM OF BASILAR ARTERY (HCC): Primary | ICD-10-CM

## 2020-12-03 DIAGNOSIS — I67.1 CEREBRAL ANEURYSM: ICD-10-CM

## 2020-12-03 NOTE — TELEPHONE ENCOUNTER
Patient wanted to know if she needed to take off from work the day after she has her diagnostic angio?

## 2020-12-07 ENCOUNTER — DOCUMENTATION ONLY (OUTPATIENT)
Dept: NEUROSURGERY | Age: 60
End: 2020-12-07

## 2020-12-07 ENCOUNTER — TELEPHONE (OUTPATIENT)
Dept: NEUROSURGERY | Age: 60
End: 2020-12-07

## 2020-12-07 NOTE — TELEPHONE ENCOUNTER
Spoke with pt about angiogram on 12/15/20. Made her aware that she needs to obtain labs prior to the procedure, she verbalized understand and states she will.

## 2020-12-09 LAB
ALBUMIN SERPL-MCNC: 4.4 G/DL (ref 3.8–4.8)
ALBUMIN/GLOB SERPL: 1.8 {RATIO} (ref 1.2–2.2)
ALP SERPL-CCNC: 87 IU/L (ref 39–117)
ALT SERPL-CCNC: 10 IU/L (ref 0–32)
AST SERPL-CCNC: 15 IU/L (ref 0–40)
BILIRUB SERPL-MCNC: <0.2 MG/DL (ref 0–1.2)
BUN SERPL-MCNC: 15 MG/DL (ref 8–27)
BUN/CREAT SERPL: 20 (ref 12–28)
CALCIUM SERPL-MCNC: 9.5 MG/DL (ref 8.7–10.3)
CHLORIDE SERPL-SCNC: 102 MMOL/L (ref 96–106)
CO2 SERPL-SCNC: 26 MMOL/L (ref 20–29)
CREAT SERPL-MCNC: 0.74 MG/DL (ref 0.57–1)
ERYTHROCYTE [DISTWIDTH] IN BLOOD BY AUTOMATED COUNT: 13.2 % (ref 11.7–15.4)
GLOBULIN SER CALC-MCNC: 2.4 G/DL (ref 1.5–4.5)
GLUCOSE SERPL-MCNC: 87 MG/DL (ref 65–99)
HCT VFR BLD AUTO: 37.9 % (ref 34–46.6)
HGB BLD-MCNC: 12.8 G/DL (ref 11.1–15.9)
MCH RBC QN AUTO: 30.8 PG (ref 26.6–33)
MCHC RBC AUTO-ENTMCNC: 33.8 G/DL (ref 31.5–35.7)
MCV RBC AUTO: 91 FL (ref 79–97)
PLATELET # BLD AUTO: 344 X10E3/UL (ref 150–450)
POTASSIUM SERPL-SCNC: 5.3 MMOL/L (ref 3.5–5.2)
PROT SERPL-MCNC: 6.8 G/DL (ref 6–8.5)
RBC # BLD AUTO: 4.16 X10E6/UL (ref 3.77–5.28)
SODIUM SERPL-SCNC: 141 MMOL/L (ref 134–144)
WBC # BLD AUTO: 5.1 X10E3/UL (ref 3.4–10.8)

## 2020-12-11 DIAGNOSIS — I67.1 CEREBRAL ANEURYSM: Primary | ICD-10-CM

## 2020-12-14 NOTE — TELEPHONE ENCOUNTER
Spoke to patient to confirm diagnostic angiogram tomorrow @ [facility]. Arrival 7:00 am.   Informed no eating or drinking after midnight, take morning medication with sips of water. Patient stated understanding.

## 2020-12-15 ENCOUNTER — HOSPITAL ENCOUNTER (OUTPATIENT)
Dept: INTERVENTIONAL RADIOLOGY/VASCULAR | Age: 60
Discharge: HOME | End: 2020-12-15
Attending: RADIOLOGY | Admitting: RADIOLOGY
Payer: COMMERCIAL

## 2020-12-15 VITALS
RESPIRATION RATE: 18 BRPM | OXYGEN SATURATION: 99 % | TEMPERATURE: 98.1 F | DIASTOLIC BLOOD PRESSURE: 83 MMHG | WEIGHT: 108 LBS | BODY MASS INDEX: 19.88 KG/M2 | HEART RATE: 67 BPM | HEIGHT: 62 IN | SYSTOLIC BLOOD PRESSURE: 122 MMHG

## 2020-12-15 DIAGNOSIS — I67.1 CEREBRAL ANEURYSM, NONRUPTURED: ICD-10-CM

## 2020-12-15 LAB
ANION GAP SERPL CALC-SCNC: 2 MMOL/L (ref 5–15)
BUN SERPL-MCNC: 19 MG/DL (ref 6–20)
BUN/CREAT SERPL: 25 (ref 12–20)
CALCIUM SERPL-MCNC: 8.8 MG/DL (ref 8.5–10.1)
CHLORIDE SERPL-SCNC: 108 MMOL/L (ref 97–108)
CO2 SERPL-SCNC: 30 MMOL/L (ref 21–32)
CREAT SERPL-MCNC: 0.76 MG/DL (ref 0.55–1.02)
GLUCOSE SERPL-MCNC: 82 MG/DL (ref 65–100)
POTASSIUM SERPL-SCNC: 4.1 MMOL/L (ref 3.5–5.1)
SODIUM SERPL-SCNC: 140 MMOL/L (ref 136–145)

## 2020-12-15 PROCEDURE — 2709999900 HC NON-CHARGEABLE SUPPLY

## 2020-12-15 PROCEDURE — 74011000636 HC RX REV CODE- 636: Performed by: RADIOLOGY

## 2020-12-15 PROCEDURE — 74011000250 HC RX REV CODE- 250: Performed by: RADIOLOGY

## 2020-12-15 PROCEDURE — 74011250636 HC RX REV CODE- 250/636: Performed by: RADIOLOGY

## 2020-12-15 PROCEDURE — 36224 PLACE CATH CAROTD ART: CPT

## 2020-12-15 PROCEDURE — 77030012468 HC VLV BLEEDBK CNTRL ABBT -B

## 2020-12-15 PROCEDURE — 77030008638 HC TU CONN COOK -A

## 2020-12-15 PROCEDURE — C1769 GUIDE WIRE: HCPCS

## 2020-12-15 PROCEDURE — 77030008584 HC TOOL GDWRE DEV TERU -A

## 2020-12-15 PROCEDURE — 36415 COLL VENOUS BLD VENIPUNCTURE: CPT

## 2020-12-15 PROCEDURE — C1760 CLOSURE DEV, VASC: HCPCS

## 2020-12-15 PROCEDURE — C1894 INTRO/SHEATH, NON-LASER: HCPCS

## 2020-12-15 PROCEDURE — 36224 PLACE CATH CAROTD ART: CPT | Performed by: RADIOLOGY

## 2020-12-15 PROCEDURE — 80048 BASIC METABOLIC PNL TOTAL CA: CPT

## 2020-12-15 PROCEDURE — 77030016715 HC CATH ANGI DX TMPO2 CARD -B

## 2020-12-15 RX ORDER — MIDAZOLAM HYDROCHLORIDE 1 MG/ML
5 INJECTION, SOLUTION INTRAMUSCULAR; INTRAVENOUS
Status: DISCONTINUED | OUTPATIENT
Start: 2020-12-15 | End: 2020-12-15

## 2020-12-15 RX ORDER — LIDOCAINE HYDROCHLORIDE 20 MG/ML
20 INJECTION, SOLUTION INFILTRATION; PERINEURAL
Status: COMPLETED | OUTPATIENT
Start: 2020-12-15 | End: 2020-12-15

## 2020-12-15 RX ORDER — FENTANYL CITRATE 50 UG/ML
100 INJECTION, SOLUTION INTRAMUSCULAR; INTRAVENOUS
Status: DISCONTINUED | OUTPATIENT
Start: 2020-12-15 | End: 2020-12-15

## 2020-12-15 RX ORDER — SODIUM CHLORIDE 9 MG/ML
25 INJECTION, SOLUTION INTRAVENOUS CONTINUOUS
Status: DISCONTINUED | OUTPATIENT
Start: 2020-12-15 | End: 2020-12-15

## 2020-12-15 RX ADMIN — MIDAZOLAM HYDROCHLORIDE 0.5 MG: 1 INJECTION, SOLUTION INTRAMUSCULAR; INTRAVENOUS at 09:11

## 2020-12-15 RX ADMIN — FENTANYL CITRATE 12.5 MCG: 50 INJECTION, SOLUTION INTRAMUSCULAR; INTRAVENOUS at 09:10

## 2020-12-15 RX ADMIN — HEPARIN SODIUM 4000 UNITS: 1000 INJECTION INTRAVENOUS; SUBCUTANEOUS at 09:04

## 2020-12-15 RX ADMIN — HEPARIN SODIUM 4000 UNITS: 1000 INJECTION INTRAVENOUS; SUBCUTANEOUS at 09:14

## 2020-12-15 RX ADMIN — IOPAMIDOL 121 ML: 612 INJECTION, SOLUTION INTRAVENOUS at 09:48

## 2020-12-15 RX ADMIN — FENTANYL CITRATE 25 MCG: 50 INJECTION, SOLUTION INTRAMUSCULAR; INTRAVENOUS at 09:47

## 2020-12-15 RX ADMIN — FENTANYL CITRATE 12.5 MCG: 50 INJECTION, SOLUTION INTRAMUSCULAR; INTRAVENOUS at 09:01

## 2020-12-15 RX ADMIN — HEPARIN SODIUM 4000 UNITS: 1000 INJECTION INTRAVENOUS; SUBCUTANEOUS at 09:06

## 2020-12-15 RX ADMIN — HEPARIN SODIUM 4000 UNITS: 1000 INJECTION INTRAVENOUS; SUBCUTANEOUS at 09:02

## 2020-12-15 RX ADMIN — MIDAZOLAM HYDROCHLORIDE 0.5 MG: 1 INJECTION, SOLUTION INTRAMUSCULAR; INTRAVENOUS at 09:01

## 2020-12-15 RX ADMIN — LIDOCAINE HYDROCHLORIDE 10 ML: 20 INJECTION, SOLUTION INFILTRATION; PERINEURAL at 09:09

## 2020-12-15 RX ADMIN — SODIUM CHLORIDE 25 ML/HR: 9 INJECTION, SOLUTION INTRAVENOUS at 08:04

## 2020-12-15 RX ADMIN — HEPARIN SODIUM 4000 UNITS: 1000 INJECTION INTRAVENOUS; SUBCUTANEOUS at 09:10

## 2020-12-15 NOTE — H&P
Neurointerventional Surgery H&P  Ira Mcknight NP    Patient: Heide Enriquez MRN: [id number]  SSN: [id number]    YOB: 1960  Age: 56 y.o. Sex: female      Subjective:      Per Dr. Ana Thorpe virtual visit 11/2/20:  .Junior Mcgregor is a 56 y. o. 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  The proximal portion of this irregularity is improved compared to the cerebral angiogram on 6/15/2020, suggesting interval mild healing. Follow-up CTA on 10/7/2020 demonstrated continued interval improvement.     Patient will continue on dual antiplatelet therapy. Shayna Sosa is due for a follow-up cerebral angiogram in December of this year. We will likely stop the Plavix after the angiogram, if everything looks good. She will continue full dose aspirin for another few months thereafter and then transition to low-dose aspirin. \"    Pt presents today for her angiogram. She feels well overall and is ready to proceed.      Past Medical History:   Diagnosis Date    Aneurysm ([address] 06/15/2020    Coiled and stented    Anxiety     Anxiety     At risk for malignant hyperthermia     MH PRECAUTIONS    Endocrine disease     hypothyroid    Gallbladder polyp     Neurological disorder     Thyroid disease      Family History   Problem Relation Age of Onset    Hypertension Mother     Dementia Father     No Known Problems Sister     No Known Problems Brother     No Known Problems Maternal Aunt     No Known Problems Maternal Uncle     No Known Problems Paternal Aunt     No Known Problems Paternal Uncle     Diabetes Maternal Grandmother     Cancer Maternal Grandfather     Cancer Paternal Grandmother     No Known Problems Paternal Grandfather      Social History     Tobacco Use    Smoking status: Never Smoker    Smokeless tobacco: Never Used   Substance Use Topics    Alcohol use: Yes     Comment: rarealy      Prior to Admission Medications   Prescriptions Last Dose Informant Patient Reported? Taking? MULTIVITAMIN PO 12/14/2020 at Unknown time  Yes Yes   Sig: Take  by mouth. aspirin (ASPIRIN) 325 mg tablet 12/15/2020 at 0600am  No Yes   Sig: Take 1 Tab by mouth daily. clopidogreL (Plavix) 75 mg tab 12/15/2020 at 0600am  No Yes   Sig: Take 1 Tab by mouth daily. estradiol (ESTRACE) 0.01 % (0.1 mg/gram) vaginal cream 12/14/2020 at Unknown time  Yes Yes   Sig: Estrace 0.01% (0.1 mg/gram) vaginal cream   Insert 1/2 g into vagina 2 x per week   levothyroxine (SYNTHROID) 75 mcg tablet 12/14/2020 at Unknown time  No Yes   Sig: Take 1 Tab by mouth Daily (before breakfast). mometasone (ELOCON) 0.1 % ointment 12/14/2020 at Unknown time  Yes Yes   Sig: mometasone 0.1 % topical ointment   APPLY A THIN LAYER TO THE AFFECTED AREA(S) BY TOPICAL ROUTE ONCE DAILY   multivitamins-ca-iron-minerals (ONE DAILY WOMEN'S) 27-0.4 mg tab 12/14/2020 at Unknown time  Yes Yes   Sig: One Daily Women's 27 mg-0.4 mg tablet   Prescribed by non 606/706 Dean Bruno MD   nystatin-triamcinolone (MYCOLOG) 100,000-0.1 unit/gram-% ointment 11/15/2020 at Unknown time  Yes Yes   Sig: nystatin-triamcinolone 100,000 unit/gram-0.1 % topical ointment   APPLY TO THE AFFECTED AREA(S) BY TOPICAL ROUTE 2 TIMES PER DAY as needed   prasterone, dhea, (INTRAROSA) 6.5 mg inst Unknown at Unknown time  Yes No   Sig: Intrarosa 6.5 mg vaginal insert   Insert 1 vaginal insert every day by vaginal route.    triamcinolone acetonide (KENALOG) 0.1 % topical cream Unknown at Unknown time  Yes No      Facility-Administered Medications: None       Allergies   Allergen Reactions    Acyclovir Nausea and Vomiting    Morphine Nausea and Vomiting    Pseudoephedrine Hcl Unknown (comments)       Review of Systems:  A comprehensive review of systems was negative. Denies numbness, tingling, chest pain, leg pain, nausea, vomiting, difficulty swallowing, headache, and dyspnea. Objective:     Vitals:    12/15/20 0730 12/15/20 0755   BP: 113/74 113/74   Pulse: 63 68   Resp: 18 20   Temp: 98.6 °F (37 °C) 98.6 °F (37 °C)   SpO2: 100% 98%   Weight: 108 lb (49 kg)    Height: 5' 2\" (1.575 m)       Physical Exam:  GENERAL: Calm, cooperative, NAD  SKIN: Warm, dry, color appropriate for ethnicity. HEART: RRR  LUNGS: CTAB    Neurologic Exam:  Mental Status:  Alert and oriented x 4. Appropriate affect, mood and behavior. Language:    Normal fluency, repetition, comprehension and naming. Cranial Nerves:   Pupils 3 mm, equal, round and reactive to light. Visual fields full to confrontation. Extraocular movements intact. Facial sensation intact. Full facial strength, no asymmetry. Hearing grossly intact bilaterally. No dysarthria. Tongue protrudes to midline, palate elevates symmetrically. Shoulder shrug 5/5 bilaterally. Motor:    No pronator drift. Bulk and tone normal.      5/5 power in all extremities proximally and distally. No involuntary movements. Sensation:    Sensation intact throughout to light touch    Coordination & Gait: Normal. FTN and HTS intact with no ataxia present.     Labs:  Lab Results   Component Value Date/Time    WBC 5.1 12/08/2020 03:49 PM    HGB 12.8 12/08/2020 03:49 PM    HCT 37.9 12/08/2020 03:49 PM    PLATELET 639 38/43/3116 03:49 PM    MCV 91 12/08/2020 03:49 PM      Lab Results   Component Value Date/Time    Sodium 141 12/08/2020 03:49 PM    Potassium 5.3 (H) 12/08/2020 03:49 PM    Chloride 102 12/08/2020 03:49 PM    CO2 26 12/08/2020 03:49 PM    Anion gap 5 06/16/2020 04:57 AM    Glucose 87 12/08/2020 03:49 PM    BUN 15 12/08/2020 03:49 PM    Creatinine 0.74 12/08/2020 03:49 PM BUN/Creatinine ratio 20 12/08/2020 03:49 PM    GFR est  12/08/2020 03:49 PM    GFR est non-AA 87 12/08/2020 03:49 PM    Calcium 9.5 12/08/2020 03:49 PM     Lab Results   Component Value Date/Time    Troponin-I, Qt. <0.04 03/11/2015 06:05 AM     Imaging:  CT Results (maximum last 3): Results from [place] encounter on 10/07/20   CTA HEAD NECK W CONT    Narrative EXAM: CTA HEAD NECK W CONT    INDICATION: Cerebral aneurysm    COMPARISON: CTA head and neck dated July 1, 2020. CONTRAST: 100 mL of Isovue-370. TECHNIQUE:  Unenhanced  images were obtained to localize the volume for  acquisition. Multislice helical axial CT angiography was performed from the  aortic arch to the top of the head during uneventful rapid bolus intravenous  contrast administration. Coronal and sagittal reformations and 3D post  processing was performed. CT dose reduction was achieved through use of a  standardized protocol tailored for this examination and automatic exposure  control for dose modulation. FINDINGS:    CTA NECK  There is conventional three vessel arch anatomy. The bilateral subclavian,  common carotid, and internal carotid arteries are patent with no flow-limiting  stenosis. % of right carotid artery stenosis: 0  % of left carotid artery stenosis: 0    NASCET method was utilized for calculating stenosis. The vertebral arteries are codominant and patent. The cervical soft tissues are  unremarkable. .    CTA HEAD  Status post coil embolization of a left internal carotid artery aneurysm. Surrounding artifact limits accurate evaluation for residual aneurysm, however  there is no evidence of large untreated residual aneurysm. Overall appearance  unchanged compared to July 1, 2020. There is appropriate distal opacification of the left internal carotid artery. No significant atherosclerotic disease of the bilateral internal carotid  arteries. No hemodynamically significant stenosis.  The bilateral middle and  anterior cerebral arteries are patent. The anterior communicating artery is diminutive but patent. The left vertebral artery is slightly more robust than the right. The basilar  artery is patent. The bilateral posterior communicating arteries are widely  patent, while the bilateral P1 segments are patent but diminutive. The posterior  cerebral arteries are patent. No intracranial hemodynamically significant stenosis. DELAYED ENHANCEMENT HEAD CT  No intra or extra-axial mass or collection. Ventricles are normal in size and  configuration. The basal cisterns are patent. Dural venous sinuses are patent. No abnormal parenchymal or meningeal  enhancement. Mastoid air cells and paranasal sinuses are clear. Impression  IMPRESSION:   1. Stable postoperative appearance of coil embolization of intracranial left  internal carotid artery aneurysm. 2. No acute vascular abnormality, the cerebral arteries are widely patent. 3. No hemodynamically significant atherosclerotic disease. Results from [facility] encounter on 07/01/20   CTA HEAD NECK W CONT    Narrative EXAM: CTA HEAD NECK W CONT    INDICATION: Dissection    COMPARISON: October 22, 2019. CONTRAST: 100 mL of Isovue-370. Tonye Cogan TECHNIQUE:  Unenhanced  images were obtained to localize the volume for  acquisition. Multislice helical axial CT angiography was performed from the  aortic arch to the top of the head during uneventful rapid bolus intravenous  contrast administration. Coronal and sagittal reformations and 3D post  processing was performed. CT dose reduction was achieved through use of a  standardized protocol tailored for this examination and automatic exposure  control for dose modulation. FINDINGS:    NASCET criteria. Prior coiling of a small aneurysm of the distal left internal carotid close to  the large posterior communicator.   Vessels are patent, no new vascular malformation or aneurysm demonstrated. Slight irregularity in the a distal extracranial left internal carotid are  likely related to the prior procedure with patency, no extravasation. Carotid bifurcations show no significant disease. Vertebral arteries in the neck  are patent and of equal size. Impression  impression: Postprocedural change distal left internal carotid as explained  above. No new significant findings. Results from [place] encounter on 10/22/19   CTA HEAD NECK W CONT    Narrative EXAM: CTA HEAD NECK W CONT  Clinical history: Left-sided pulsatile tinnitus  INDICATION: Pulsatile tinnitus, left    COMPARISON: None. CONTRAST: 100 mL of Isovue-370. TECHNIQUE:  Unenhanced  images were obtained to localize the volume for  acquisition. Multislice helical axial CT angiography was performed from the  aortic arch to the top of the head during uneventful rapid bolus intravenous  contrast administration. Coronal and sagittal reformations and 3D post  processing was performed. CT dose reduction was achieved through use of a  standardized protocol tailored for this examination and automatic exposure  control for dose modulation. FINDINGS:    CTA NECK  There is no pulmonary mass or nodule. The right and left vertebral arteries are  codominant. Mucous retention cysts in the left maxillary sinus. . The bilateral  subclavian, common carotid, and internal carotid arteries are patent with no  flow-limiting stenosis. % of right carotid artery stenosis: 0  % of left carotid artery stenosis: 0    NASCET method was utilized for calculating stenosis. The vertebral arteries are codominant and patent. The cervical soft tissues are  unremarkable. There are degenerative changes of the cervical spine. CTA HEAD  Petrous and cavernous carotid arteries are patent. There are posterior  communicating arteries present on the right and on the left. A 2 segments are  within normal limits.  M1 segments are within normal limits. M2 segments  demonstrate symmetric arborization. . The dural venous sinuses are patent. A1  segments are patent. The basilar artery is diminutive in size. The basilar  artery largely terminates in the superior cerebellar arteries. There is no  evidence of vascular loop. There is no lateralization of the AICA demonstrated. P1 and P2 segments are unremarkable. . The internal carotid, anterior cerebral,  and middle cerebral arteries are patent. There is no flow-limiting intracranial  stenosis. There is no aneurysm. Likely tiny infundibulum related to meningeal  artery on the left. There are no sizable posterior communicating arteries. Impression IMPRESSION:   There is no aneurysm, dissection, hemodynamically significant stenosis. There is no major vessel occlusion. Incidentally noted diminutive vertebrobasilar system with bilateral persistent  fetal circulation to the posterior cerebral artery territories. The basilar  artery largely terminates in the superior cerebellar arteries. Assessment:     Hospital Problems  Date Reviewed: 11/2/2020    None        Plan:      Proceed with diagnostic cerebral angiogram today with Dr. Isaac Garay  - Follow up in [address] planned to review imaging    Labs reviewed prior to procedure:  - Creatnine 0.74, Hgb 12.8  - K 5.3, will send repeat BMP today     Risk, benefits and alternatives of procedure were reviewed prior to this procedure by myself and Dr. Isaac Garay with patient. The patient verbalized understanding and would like to proceed with procedure as planned. All questions were answered, and written informed consent has been obtained.       Signed By: Marcia Chilel NP     December 15, 2020

## 2020-12-15 NOTE — PROGRESS NOTES
1300 pm- Discharge instructions reviewed with patient with good understanding. Patient signed hard copy of discharge instructions and copy given to patient upon leaving. IV removed. Dressing to right groin angiogram site remains dry and intact. Patient taken to car via wheelchair with nurse at side. Patient discharged stable.

## 2020-12-15 NOTE — DISCHARGE INSTRUCTIONS
Patient Education     [facility]  Radiology Department  [phone]      Radiologist:  Dr. Isaac Suarez    Date:  12/15/2020    Cerebral Angiogram Discharge Instructions      Go home and rest and restrict your activity the next 24 - 48 hours, limiting the amount of walking you do. You have been given sedating medications, so do not drive or drink alcohol today. Resume your previous diet and medications and be sure to increase your fluid intake for the next 24 hours. You may take Tylenol, as directed on the label, for pain or discomfort. Avoid Ibuprofen (Advil, Motrin etc.) and Aspirin today as they may increase your risk of bleeding. If new severe leg pain, numbness or tingling occurs please seek emergency medical treatment. Avoid heavy lifting (nothing greater than 5 pounds),  excessive bending, and pushing or pulling movements for one week. Then begin to advance your activity as tolerated. Keep your groin dressing clean and dry. Observe the site for any bleeding. If bleeding should occur from the site, apply firm direct pressure for 15 minutes. If the bleeding can not be stopped please seek emergency medical treatment. Someone will need to drive you while you continue to hold pressure. You may change the dressing in 24 hours. Wash the area with soap and water. Do not soak or swim until the site has healed completely. If a MYNX/Angioseal closure device was used, you may feel a small lump under the dressing in your groin. Do not rub or massage it. It is collegen and will dissolve on its on over the next month. Be sure to follow up with your ordering physician as previously discussed. Side effects of sedation medications and other medications used today have been reviewed. Notify us of nausea, itching, hives, dizziness, or anything else out of the ordinary.        Should you experience any of these significant changes, please call [phone] between the hours of 7:30 am and 10 pm or [phone] after hours. After hours, ask the  to page the X-ray Technologist, and describe the problem to the technologist.       Ivis Cardoso: What to Expect at [address] brain angiogram (cerebral angiogram) is a test (also called a procedure) that looks for problems with blood vessels and blood flow in the brain. The doctor inserted a thin, flexible tube (catheter) into a blood vessel in your groin. Or the doctor may have put the catheter in a blood vessel in your arm. Then he or she injected a dye into the catheter. The dye flows into the blood vessel. The dye made the blood vessels show up on a video screen. You may have had this test to see if a blood vessel in the brain is bulging, narrowed, or blocked. The test may also be used to check other symptoms, such as unusual headaches, or to check problems found during a different test.  You may have a bruise where the catheter was put in, and you may feel sore for a day or two. You can do light activities around the house. But don't do anything strenuous for several days. This care sheet gives you a general idea about how long it will take for you to recover. But each person recovers at a different pace. Follow the steps below to feel better as quickly as possible. How can you care for yourself at home? Activity    · Do not do strenuous exercise and do not lift, pull, or push anything heavy until your doctor says it is okay. This may be for a day or two. You can walk around the house and do light activity, such as cooking.     · If the catheter was placed in your groin, try not to walk up stairs for the first couple of days.     · If the catheter was placed in your arm near your wrist, do not bend your wrist deeply for the first couple of days. Be careful using your hand to get into and out of a chair or bed.     · If your doctor recommends it, get more exercise. Walking is a good choice.  Bit by bit, increase the amount you walk every day. Try for at least 30 minutes on most days of the week. Diet    · Drink plenty of fluids to help your body flush out the dye. If you have kidney, heart, or liver disease and have to limit fluids, talk with your doctor before you increase the amount of fluids you drink.     · Keep eating a heart-healthy diet that has lots of fruits, vegetables, and whole grains. If you need help with your diet, talk to your doctor. You also may want to talk to a dietitian. This expert can help you to learn about healthy foods and plan meals. Medicines    · Your doctor will tell you if and when you can restart your medicines. He or she will also give you instructions about taking any new medicines.     · If you take aspirin or some other blood thinner, ask your doctor if and when to start taking it again. Make sure that you understand exactly what your doctor wants you to do.     · Be safe with medicines. Read and follow all instructions on the label. ? If the doctor gave you a prescription medicine for pain, take it as prescribed. ? If you are not taking a prescription pain medicine, ask your doctor if you can take an over-the-counter medicine. Care of the catheter site    · For the first 3 days, keep a bandage over the spot where the catheter was put in.     · Put ice or a cold pack on the area for 10 to 20 minutes at a time. Try to do this every 1 to 2 hours for the next 3 days (when you are awake) or until the swelling goes down. Put a thin cloth between the ice and your skin.     · You may shower 24 to 48 hours after the procedure, if your doctor okays it. Pat the incision dry.     · Do not soak the catheter site until it is healed. Don't take a bath for 1 week, or until your doctor tells you it is okay.     · Watch for bleeding from the site.  A small amount of blood (up to the size of a quarter) on the bandage can be normal.     · If you are bleeding, lie down and press on the area for 15 minutes to try to make it stop. If the bleeding does not stop, call your doctor or seek immediate medical care. Follow-up care is a key part of your treatment and safety. Be sure to make and go to all appointments, and call your doctor if you are having problems. It's also a good idea to know your test results and keep a list of the medicines you take. When should you call for help? Call 911 anytime you think you may need emergency care. For example, call if:    · You passed out (lost consciousness).     · You have severe trouble breathing.     · You have sudden chest pain and shortness of breath, or you cough up blood.     · You have symptoms of a stroke. These may include:  ? Sudden numbness, tingling, weakness, or loss of movement in your face, arm, or leg, especially on only one side of your body. ? Sudden vision changes. ? Sudden trouble speaking. ? Sudden confusion or trouble understanding simple statements. ? Sudden problems with walking or balance. ? A sudden, severe headache that is different from past headaches. Call your doctor now or seek immediate medical care if:    · You are bleeding from the area where the catheter was put in your artery.     · You have a fast-growing, painful lump at the catheter site.     · You have symptoms of infection, such as:  ? Increased pain, swelling, warmth, or redness. ? Red streaks leading from the area. ? Pus draining from the area. ? A fever.     · Your leg, arm, or hand is painful, looks blue, or feels cold, numb, or tingly. Watch closely for any changes in your health, and be sure to contact your doctor if:    · You are not getting better as expected. Where can you learn more? Go to http://www.gray.com/  Enter O249 in the search box to learn more about \"Brain Angiogram: What to Expect at Home. \"  Current as of: March 4, 2020               Content Version: 12.6  © 9754-9060 [employer], [facility].    Care instructions adapted under license by [employer] (which disclaims liability or warranty for this information). If you have questions about a medical condition or this instruction, always ask your healthcare professional. [address] any warranty or liability for your use of this information.

## 2020-12-15 NOTE — BRIEF OP NOTE
NEUROINTERVENTIONAL SURGERY POST-PROCEDURE NOTE    PROCEDURE:  Diagnostic cerebral angiogram    VESSEL(S) STUDIED:  1. Left CCA VESSEL(S) TREATED:  1. N/A        PRELIMINARY REPORT & DISPOSITION:   Status post prior stent-assisted coil embolization of left ICA aneurysm. No evidence of significant residual or recurrent aneurysm. Healing left ICA dissection. COMPLICATIONS:  None    FOLLOW-UP:  Will schedule phone visit to discuss plan with patient DATE OF SERVICE:  12/15/2020 11:35 AM     ATTENDING SURGEON(S):  Moris Hurst MD      ANESTHESIA:   Conscious sedation    MEDICATIONS:   See nursing record    PUNCTURE SITE:  Right common femoral artery. Arteriotomy closed with [employer]. Flat with leg straight x 2 hours. Glynda Merlin [employer], M.D.     Milton Neff    , Department of Radiology  [facility]

## 2020-12-15 NOTE — PROGRESS NOTES
Pt arrives ambulatory to angio department accompanied by self for Angio procedure. All assessments completed and consent was reviewed. Education given was regarding procedure, conscious sedation, post-procedure care and  management/follow-up. Opportunity for questions was provided and all questions and concerns were addressed. Romi Virk NP in to talk with patient about diagnostic angiogram with conscious sedation. Patient verbalized understanding Consent obtained and signed for diagnostic cerebral angiogram with groin seal closure device. Dr. Quach Clamp in to talk with patient before procedure. Will wait for pending lab to result before starting procedure.

## 2020-12-15 NOTE — PROGRESS NOTES
Oozing noted to dressing. Dr. Mayda Galan notified. Will hold pressure to right groin until he comes over to check on patient. Direct pressure held for 10 minutes. Oozing stopped. New dressing applied. Dr. Mayda Galan will come see patient soon. Dr. Mayda Galan at bedside to check on patient.  Patient will ambulate to see if the dressing oozes more and observe for about 30 minutes for bleeding after ambulation trial.

## 2020-12-17 ENCOUNTER — TELEPHONE (OUTPATIENT)
Dept: NEUROSURGERY | Age: 60
End: 2020-12-17

## 2020-12-17 DIAGNOSIS — I67.1 CEREBRAL ANEURYSM, NONRUPTURED: Primary | ICD-10-CM

## 2020-12-17 NOTE — TELEPHONE ENCOUNTER
Spoke to patient to patient to inform her per provider, she can stop taking plavix and continue taking 325 mg aspirin. MRA in one year. Patient stated understanding. Asked patient how she was feeling since procedure. Patient stated she felt fine. Denies headaches, dizziness, blurred or double vision, n/v. She reports puncture site intact. No bleeding, bruising or pain at puncture site or right leg. Patient reports she did lift a grocery bag from her car yesterday but does not think it was 10 pounds. No acute problems reported.

## 2021-01-08 ENCOUNTER — TELEPHONE (OUTPATIENT)
Dept: NEUROSURGERY | Age: 61
End: 2021-01-08

## 2021-01-08 NOTE — TELEPHONE ENCOUNTER
I called patient today to confirm her follow-up. After the cerebral angiogram performed 12/15/2020, I instructed the patient to stop taking Plavix but continue aspirin 325 mg daily. Patient expressed understanding. She stated that she would continue aspirin 325 mg daily for 6 months and then transition to baby aspirin daily. She is to have a follow-up brain MRA in 1 year. The order is already in. We will see her in clinic thereafter to review the results of the MRA.

## 2021-01-09 ENCOUNTER — OFFICE VISIT (OUTPATIENT)
Dept: URGENT CARE | Age: 61
End: 2021-01-09

## 2021-01-09 VITALS — OXYGEN SATURATION: 98 % | TEMPERATURE: 98.3 F | RESPIRATION RATE: 17 BRPM | HEART RATE: 80 BPM

## 2021-01-09 DIAGNOSIS — Z20.822 ENCOUNTER FOR LABORATORY TESTING FOR COVID-19 VIRUS: Primary | ICD-10-CM

## 2021-01-09 PROCEDURE — 99202 OFFICE O/P NEW SF 15 MIN: CPT | Performed by: FAMILY MEDICINE

## 2021-01-09 NOTE — PROGRESS NOTES
This patient was seen at [address] [place] [address] Urgent Care while in their vehicle due to COVID-19 pandemic with PPE and focused examination in order to decrease community viral transmission. The patient/guardian gave verbal consent to treat. Past Medical History:   Diagnosis Date    Aneurysm ([address] 06/15/2020    Coiled and stented    Anxiety     At risk for malignant hyperthermia     MH PRECAUTIONS    Gallbladder polyp     Hypothyroid     Neurological disorder         Past Surgical History:   Procedure Laterality Date    HX GYN      HX OVARIAN CYST REMOVAL      HX OVARIAN CYST REMOVAL Right          Family History   Problem Relation Age of Onset    Hypertension Mother     Dementia Father     No Known Problems Sister     No Known Problems Brother     No Known Problems Maternal Aunt     No Known Problems Maternal Uncle     No Known Problems Paternal Aunt     No Known Problems Paternal Uncle     Diabetes Maternal Grandmother     Cancer Maternal Grandfather     Cancer Paternal Grandmother     No Known Problems Paternal Grandfather         Social History     Socioeconomic History    Marital status:      Spouse name: Not on file    Number of children: Not on file    Years of education: Not on file    Highest education level: Not on file   Occupational History    Not on file   Social Needs    Financial resource strain: Not on file    Food insecurity     Worry: Not on file     Inability: Not on file    Transportation needs     Medical: Not on file     Non-medical: Not on file   Tobacco Use    Smoking status: Never Smoker    Smokeless tobacco: Never Used   Substance and Sexual Activity    Alcohol use: Yes     Comment: rarealy    Drug use:  Yes    Sexual activity: Yes     Partners: Male     Birth control/protection: Condom   Lifestyle    Physical activity     Days per week: Not on file     Minutes per session: Not on file    Stress: Not on file   Relationships    Social connections     Talks on phone: Not on file     Gets together: Not on file     Attends [employer]al service: Not on file     Active member of club or organization: Not on file     Attends meetings of clubs or organizations: Not on file     Relationship status: Not on file    Intimate partner violence     Fear of current or ex partner: Not on file     Emotionally abused: Not on file     Physically abused: Not on file     Forced sexual activity: Not on file   Other Topics Concern    Not on file   Social History Narrative    ** Merged History Encounter **                     ALLERGIES: Acyclovir, Morphine, and Pseudoephedrine hcl    Review of Systems   All other systems reviewed and are negative. Vitals:    01/09/21 1300   Pulse: 80   Resp: 17   Temp: 98.3 °F (36.8 °C)   SpO2: 98%       Physical Exam  Vitals signs and nursing note reviewed. Constitutional:       General: She is not in acute distress. Appearance: She is not ill-appearing. Pulmonary:      Effort: Pulmonary effort is normal. No respiratory distress. Breath sounds: No wheezing. MDM    Procedures             ICD-10-CM ICD-9-CM    1. Encounter for laboratory testing for COVID-19 virus  Z20.822 V01.79 NOVEL CORONAVIRUS (COVID-19)     No orders of the defined types were placed in this encounter. No results found for any visits on 01/09/21. The patients condition was discussed with the patient and they understand. The patient is to follow up with primary care doctor. If signs and symptoms become worse the pt is to go to the ER. The patient is to take medications as prescribed.

## 2021-01-10 LAB — SARS-COV-2, NAA: NOT DETECTED

## 2021-01-29 ENCOUNTER — TELEPHONE (OUTPATIENT)
Dept: INTERNAL MEDICINE CLINIC | Age: 61
End: 2021-01-29

## 2021-01-29 DIAGNOSIS — K57.92 DIVERTICULITIS: ICD-10-CM

## 2021-01-29 DIAGNOSIS — R10.31 RIGHT LOWER QUADRANT PAIN: Primary | ICD-10-CM

## 2021-01-29 NOTE — TELEPHONE ENCOUNTER
Pt called. Pt states she has dull pain in rt lower quadrant. She states she feels it more when lying down, but not as much when standing up. Pt requests an appt soon, IN PERSON, WITH A FEMALE PROVIDER. 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 She also needs an annual physical.    Please call back and fit in with any FEMALE provider if available.     Phone: [phone]

## 2021-02-02 ENCOUNTER — OFFICE VISIT (OUTPATIENT)
Dept: INTERNAL MEDICINE CLINIC | Age: 61
End: 2021-02-02
Payer: COMMERCIAL

## 2021-02-02 ENCOUNTER — TRANSCRIBE ORDER (OUTPATIENT)
Dept: SCHEDULING | Age: 61
End: 2021-02-02

## 2021-02-02 ENCOUNTER — TELEPHONE (OUTPATIENT)
Dept: INTERNAL MEDICINE CLINIC | Age: 61
End: 2021-02-02

## 2021-02-02 VITALS
TEMPERATURE: 97 F | BODY MASS INDEX: 19.88 KG/M2 | RESPIRATION RATE: 18 BRPM | HEIGHT: 62 IN | WEIGHT: 108 LBS | OXYGEN SATURATION: 100 % | HEART RATE: 60 BPM

## 2021-02-02 DIAGNOSIS — R10.2 PELVIC PAIN: Primary | ICD-10-CM

## 2021-02-02 DIAGNOSIS — E78.00 HIGH CHOLESTEROL: ICD-10-CM

## 2021-02-02 DIAGNOSIS — R10.2 PELVIC PAIN: ICD-10-CM

## 2021-02-02 DIAGNOSIS — R10.32 LEFT LOWER QUADRANT PAIN: ICD-10-CM

## 2021-02-02 DIAGNOSIS — E03.9 ACQUIRED HYPOTHYROIDISM: Primary | ICD-10-CM

## 2021-02-02 PROCEDURE — 99214 OFFICE O/P EST MOD 30 MIN: CPT | Performed by: INTERNAL MEDICINE

## 2021-02-02 NOTE — PROGRESS NOTES
Reviewed record in preparation for visit and have obtained necessary documentation. Identified pt with two pt identifiers(name and ). Chief Complaint   Patient presents with    Complete Physical       Health Maintenance Due   Topic Date Due    DTaP/Tdap/Td  (1 - Tdap) 1979    Pap Test  1979    Shingles Vaccine (1 of 2) 2008    Mammogram  2011       Ms. Arun Herring has a reminder for a \"due or due soon\" health maintenance. I have asked that she discuss this further with her primary care provider for follow-up on this health maintenance. Coordination of Care Questionnaire:  :     1) Have you been to an emergency room, urgent care clinic since your last visit? no   Hospitalized since your last visit? no             2) Have you seen or consulted any other health care providers outside of [address] [place] [address] since your last visit? no  (Include any pap smears or colon screenings in this section.)    3) In the event something were to happen to you and you were unable to speak on your behalf, do you have an Advance Directive/ Living Will in place stating your wishes? NO    Do you have an Advance Directive on file? no    4) Are you interested in receiving information on Advance Directives? NO    Patient is accompanied by self I have received verbal consent from Luis Sánchez to discuss any/all medical information while they are present in the room.

## 2021-02-02 NOTE — PROGRESS NOTES
Ms. 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  The proximal portion of this irregularity is improved compared to the cerebral angiogram on 6/15/2020, suggesting interval mild healing. Follow-up CTA on 10/7/2020 demonstrated continued interval improvement. Doing well, grateful for having aneurysm coiled      Patient reports occasional burning in mid abdomen low abdomen   Which improves with laying on the side  Notes  Deep pain on right lower quadrant intermittent/ not severe - present for months, no urinary symptoms    Saw Dr Susan Theodore for hypothyroidism and now taking synthroid 75mcg 6 days a week and holding one day. Denies hair loss, brittle nails, weight gain.      Father has colon cancer - new diagnosis   Ms Bisi Moseley last colonoscopy was in 2019 - discussed should repeat in 2024 given father's diagnosis    Teacher - completed covid series  Review of systems:  Constitutional: negative for fever, chills, weight loss, night sweats   10 systems reviewed and negative other than HPI       Past Medical History:   Diagnosis Date    Aneurysm ([address] 06/15/2020    Coiled and stented    Anxiety     At risk for malignant hyperthermia     MH PRECAUTIONS    Gallbladder polyp     Hypothyroid     Neurological disorder         Past Surgical History:   Procedure Laterality Date    HX GYN      HX OVARIAN CYST REMOVAL      HX OVARIAN CYST REMOVAL Right        Allergies   Allergen Reactions    Acyclovir Nausea and Vomiting     Other reaction(s): Vomiting    Morphine Nausea and Vomiting    Pseudoephedrine Hcl Unknown (comments)    Pseudoephedrine Palpitations     Other reaction(s): Unknown (comments)       Current Outpatient Medications on File Prior to Visit   Medication Sig Dispense Refill    levothyroxine (SYNTHROID) 75 mcg tablet Take 1 Tab by mouth Daily (before breakfast). 90 Tab 1    aspirin (ASPIRIN) 325 mg tablet Take 1 Tab by mouth daily. 30 Tab 3     No current facility-administered medications on file prior to visit. family history includes Cancer in her maternal grandfather and paternal grandmother; Colon Cancer (age of onset: 80) in her father; Dementia in her father; Diabetes in her maternal grandmother; Hypertension in her mother; No Known Problems in her brother, maternal aunt, maternal uncle, paternal aunt, paternal grandfather, paternal uncle, and sister.     Social History     Socioeconomic History    Marital status:      Spouse name: Not on file    Number of children: Not on file    Years of education: Not on file    Highest education level: Not on file   Occupational History    Not on file   Social Needs    Financial resource strain: Not on file    Food insecurity     Worry: Not on file     Inability: Not on file    Transportation needs     Medical: Not on file     Non-medical: Not on file   Tobacco Use    Smoking status: Never Smoker    Smokeless tobacco: Never Used   Substance and Sexual Activity    Alcohol use: Not Currently     Comment: rarealy    Drug use: Never    Sexual activity: Yes     Partners: Male     Birth control/protection: Condom   Lifestyle    Physical activity     Days per week: Not on file     Minutes per session: Not on file    Stress: Not on file   Relationships    Social connections     Talks on phone: Not on file     Gets together: Not on file     Attends Ep[employer] service: Not on file     Active member of club or organization: Not on file     Attends meetings of clubs or organizations: Not on file     Relationship status: Not on file    Intimate partner violence     Fear of current or ex partner: Not on file     Emotionally abused: Not on file     Physically abused: Not on file     Forced sexual activity: Not on file   Other Topics Concern    Not on file   Social History Narrative    ** Merged History Encounter **            Visit Vitals  Pulse 60   Temp 97 °F (36.1 °C) (Oral)   Resp 18   Ht 5' 2\" (1.575 m)   Wt 108 lb (49 kg)   SpO2 100%   BMI 19.75 kg/m²     General:  Well appearing female no acute distress  HEENT:   PERRL,normal conjunctiva. External ear and canals normal, TMs normal.  Hearing normal to voice. Neck:  Supple. Thyroid normal size, nontender, without nodules. No carotid bruit. No masses or lymphadenopathy  Respiratory: no respiratory distress,  no wheezing, no rhonchi, no rales. No chest wall tenderness. Cardiovascular:  RRR, normal S1S2, no murmur. Gastrointestinal: normal bowel sounds, soft, nontender, without masses. No hepatosplenomegaly. Extremities +2 pulses, no edema, normal sensation   Musculoskeletal:  Normal gait. Normal digits and nails. Normal strength and tone, no atrophy, and no abnormal movement. Skin:  No rash, no lesions, no ulcers. Skin warm, normal turgor, without induration or nodules. Neuro:  A and OX4, fluent speech, cranial nerves normal 2-12. Sensation normal to light touch.   DTR symmetrical  Psych:  Normal affect      Lab Results   Component Value Date/Time    WBC 5.1 12/08/2020 03:49 PM    HGB 12.8 12/08/2020 03:49 PM    HCT 37.9 12/08/2020 03:49 PM    PLATELET 337 41/36/2062 03:49 PM    MCV 91 12/08/2020 03:49 PM     Lab Results   Component Value Date/Time    Sodium 140 12/15/2020 08:00 AM    Potassium 4.1 12/15/2020 08:00 AM    Chloride 108 12/15/2020 08:00 AM    CO2 30 12/15/2020 08:00 AM    Anion gap 2 (L) 12/15/2020 08:00 AM    Glucose 82 12/15/2020 08:00 AM    BUN 19 12/15/2020 08:00 AM    Creatinine 0.76 12/15/2020 08:00 AM    BUN/Creatinine ratio 25 (H) 12/15/2020 08:00 AM    GFR est AA >60 12/15/2020 08:00 AM    GFR est non-AA >60 12/15/2020 08:00 AM    Calcium 8.8 12/15/2020 08:00 AM     Lab Results   Component Value Date/Time    Cholesterol, total 198 01/30/2018 08:01 AM    HDL Cholesterol 69 01/30/2018 08:01 AM    LDL, calculated 120 (H) 01/30/2018 08:01 AM    VLDL, calculated 9 01/30/2018 08:01 AM    Triglyceride 46 01/30/2018 08:01 AM     Lab Results   Component Value Date/Time    TSH 1.410 03/14/2019 04:21 PM     No results found for: HBA1C, HGBE8, LWY4IOKT, SZK6UBNS, KOM9RNXV  Lab Results   Component Value Date/Time    VITAMIN D, 25-HYDROXY 45.5 10/15/2018 01:50 PM                   Assessment and Plan:     1. Acquired hypothyroidism - currently on levothyroxine 75mccg daily 6 X week / euthyroid on exam   - TSH AND FREE T4    2. Left lower quadrant pain  - US TRANSVAGINAL; Future    3. Pelvic pain  - US TRANSVAGINAL; Future    4. High cholesterol  - LIPID PANEL    5. Father with recent diagnosis of colon cancer - repeat colonoscopy in 2024  Follow-up and Dispositions    · Return in about 1 year (around 2/2/2022).           Quinn Phillips MD

## 2021-02-02 NOTE — TELEPHONE ENCOUNTER
----- Message from Lexus Bernal MD sent at 2/2/2021  9:11 AM EST -----  Need record from Dr Yovany Crenshaw for pap smear and mammogram

## 2021-02-03 LAB
CHOLEST SERPL-MCNC: 181 MG/DL (ref 100–199)
HDLC SERPL-MCNC: 73 MG/DL
LDLC SERPL CALC-MCNC: 96 MG/DL (ref 0–99)
T4 FREE SERPL-MCNC: 1.48 NG/DL (ref 0.82–1.77)
TRIGL SERPL-MCNC: 62 MG/DL (ref 0–149)
TSH SERPL DL<=0.005 MIU/L-ACNC: 1.61 UIU/ML (ref 0.45–4.5)
VLDLC SERPL CALC-MCNC: 12 MG/DL (ref 5–40)

## 2021-02-04 NOTE — PROGRESS NOTES
Thyroid is at goal 1.6   Continue current therapy of levothyroxine  Cholesterol looks good  Message sent on my chart

## 2021-02-05 ENCOUNTER — HOSPITAL ENCOUNTER (OUTPATIENT)
Dept: ULTRASOUND IMAGING | Age: 61
Discharge: HOME | End: 2021-02-05
Attending: INTERNAL MEDICINE
Payer: COMMERCIAL

## 2021-02-05 DIAGNOSIS — R10.2 PELVIC PAIN: ICD-10-CM

## 2021-02-05 DIAGNOSIS — R10.32 LEFT LOWER QUADRANT PAIN: ICD-10-CM

## 2021-02-05 PROCEDURE — 76856 US EXAM PELVIC COMPLETE: CPT

## 2021-02-05 PROCEDURE — 76830 TRANSVAGINAL US NON-OB: CPT

## 2021-02-05 NOTE — PROGRESS NOTES
There is a simple cyst on the left ovary benign appearance follow up with gynecologist    Since you reported pain on right ovary I recommend that if symptoms persist we obtain a CT scan of the abdomen and pelvis for further evaluation -let me know if you agree  Message sent on my chart

## 2021-02-09 ENCOUNTER — TELEPHONE (OUTPATIENT)
Dept: INTERNAL MEDICINE CLINIC | Age: 61
End: 2021-02-09

## 2021-02-09 NOTE — TELEPHONE ENCOUNTER
----- Message from Henry Rocha sent at 2/9/2021  4:44 PM EST -----  Regarding: Dr José Miguel Drummond first and last name:      Reason for call:Pt is following up on results of vaginal ultra sound done on 02/04/21      Callback required yes/no and why:yes      Best contact number(s): [phone]      Message from [facility]

## 2021-02-11 ENCOUNTER — TELEPHONE (OUTPATIENT)
Dept: INTERNAL MEDICINE CLINIC | Age: 61
End: 2021-02-11

## 2021-02-11 NOTE — TELEPHONE ENCOUNTER
Pt returned call from Princess. Pt advised that Princess sent a message with results in [address] [place] [address] [address]. Pt requests a call back to discuss with nurse. Please call back.     Phone [phone]    She is a teacher so requests:   Call in next 10 min,  Or between 2:30-3, or after 4 pm

## 2021-02-11 NOTE — TELEPHONE ENCOUNTER
Spoke with patient. Two pt identifiers confirmed. Patient advised of her recent ultrasound results per Dr. Tonny Martínez. Patient states that she is not bothering with the discomfort on her right side at this time, so she would like to hold off on the CT scan. Patient advised to contact the office if any thing changes. Pt verbalized understanding of information discussed w/ no further questions at this time.

## 2021-02-12 ENCOUNTER — TELEPHONE (OUTPATIENT)
Dept: INTERNAL MEDICINE CLINIC | Age: 61
End: 2021-02-12

## 2021-02-12 NOTE — TELEPHONE ENCOUNTER
#[phone] pt states she will be seeing Dr. Kishor Nickerson gyn and they are requesting the imaging and reports in order to see her. They need these today please. Pt would like a call back letting her know this has gone.      Fax #[phone] ATTN: Dr. Kishor Nickerson

## 2021-02-15 NOTE — TELEPHONE ENCOUNTER
#[phone]  Pt states she is off today and would like a call back this morning pertaining to CT scan that she wants scheduled for Thursday PM due to another appt that same day.       Please call pt

## 2021-02-15 NOTE — TELEPHONE ENCOUNTER
Pt called stating she still has pain in her right side. Pt requests call back to discuss options with nurse. Please call pt back.   [phone]

## 2021-02-15 NOTE — TELEPHONE ENCOUNTER
MD Elise Trejo LPN   Caller: Unspecified (2 weeks ago)             CT ordered      Spoke with patient. Two pt identifiers confirmed. Patient advised that Dr. Kevin Soto has ordered the CT. Patient provided with scheduling number. Pt verbalized understanding of information discussed w/ no further questions at this time.

## 2021-02-18 ENCOUNTER — HOSPITAL ENCOUNTER (OUTPATIENT)
Dept: CT IMAGING | Age: 61
Discharge: HOME | End: 2021-02-18
Attending: INTERNAL MEDICINE
Payer: COMMERCIAL

## 2021-02-18 DIAGNOSIS — K57.92 DIVERTICULITIS: ICD-10-CM

## 2021-02-18 DIAGNOSIS — R10.31 RIGHT LOWER QUADRANT PAIN: ICD-10-CM

## 2021-02-18 PROCEDURE — 74177 CT ABD & PELVIS W/CONTRAST: CPT

## 2021-02-18 PROCEDURE — 74011000636 HC RX REV CODE- 636: Performed by: INTERNAL MEDICINE

## 2021-02-18 RX ORDER — BARIUM SULFATE 20 MG/ML
900 SUSPENSION ORAL
Status: DISCONTINUED | OUTPATIENT
Start: 2021-02-18 | End: 2021-02-19 | Stop reason: HOSPADM

## 2021-02-18 RX ADMIN — IOPAMIDOL 100 ML: 755 INJECTION, SOLUTION INTRAVENOUS at 16:11

## 2021-02-18 NOTE — PROGRESS NOTES
CT scan shows prominent amount of stools otherwise no acute findings- constipation is likely etiology of pain if patient agrees can start linzess to help with daily BMs and see if symptoms resolve  Diverticulosis without diverticulitis

## 2021-02-23 ENCOUNTER — PATIENT MESSAGE (OUTPATIENT)
Dept: INTERNAL MEDICINE CLINIC | Age: 61
End: 2021-02-23

## 2021-02-28 ENCOUNTER — TELEPHONE (OUTPATIENT)
Dept: INTERNAL MEDICINE CLINIC | Age: 61
End: 2021-02-28

## 2021-02-28 NOTE — TELEPHONE ENCOUNTER
[facility] Office Pool             General Message/Vendor Calls     Caller's first and last name: N/A       Reason for call:Would like to discuss test results that she received       Callback required yes/no and why: Yes       Best contact number(s): [phone]       Details to clarify the request: N/A       Kris Pimentel     Copy/Paste  [facility] after closing on 2/26/21

## 2021-03-01 NOTE — TELEPHONE ENCOUNTER
Spoke with patient regarding her recent test results. Patient states that she is upset because the doctor did not call her to explain to her these results. Patient states that she feels that it is unprofessional for us to send her results to her via [employer] and she does not appreciate having a nurse call her with her results and she feels that I am just reading to her what she can read herself. Advised patient that her scan was normal except for the information that she was provided regarding the constipation and the diverticulosis. Patient states that she is not constipated so she feels that was read wrong. Advised patient that I would have Dr. Neri Diaz contact her. Pt verbalized understanding of information discussed w/ no further questions at this time.

## 2021-03-02 ENCOUNTER — VIRTUAL VISIT (OUTPATIENT)
Dept: INTERNAL MEDICINE CLINIC | Age: 61
End: 2021-03-02
Payer: COMMERCIAL

## 2021-03-02 DIAGNOSIS — K58.9 IRRITABLE BOWEL SYNDROME, UNSPECIFIED TYPE: Primary | ICD-10-CM

## 2021-03-02 PROCEDURE — 99212 OFFICE O/P EST SF 10 MIN: CPT | Performed by: INTERNAL MEDICINE

## 2021-03-02 RX ORDER — CLOPIDOGREL BISULFATE 75 MG/1
TABLET ORAL
COMMUNITY
End: 2021-11-09 | Stop reason: ALTCHOICE

## 2021-03-02 NOTE — PROGRESS NOTES
CC: Results      HPI:    She is a 56 y.o. female who presents for evaluation of results. In working up her abdominal pain . Pain is not severe R sided , \"feels like something is inflamed\" notes at night, also has gas. US pelvic showed small cyst on left side  Given family hx of malignancy proceeded with CT scan   Which showed   Large stool burden and diverticulosis  Reviewed colonoscopy report which showed diverticulosis  Given chronicity of pain diagnosis is irritable bowel disease   Discussed possibly using linzess patient declined. Discussed dietary modification         This is an established visit conducted via phone . The patient has been instructed that this meets HIPAA criteria and acknowledges and agrees to this method of visitation. Pursuant to the emergency declaration under the [facility] waiver authority and the [employer] Act, this Virtual Visit was conducted, with patient's consent, to reduce the patient's risk of exposure to COVID-19 and provide continuity of care for an established patient. Services were provided through phone visit  ROS:  Constitutional: negative for fevers, chills, anorexia and weight loss  10 systems reviewed and negative other than HPI    Past Medical History:   Diagnosis Date    Aneurysm ([facility] [address] 06/15/2020    Coiled and stented    Anxiety     At risk for malignant hyperthermia     MH PRECAUTIONS    Gallbladder polyp     Hypothyroid     Neurological disorder        Current Outpatient Medications on File Prior to Visit   Medication Sig Dispense Refill    clopidogreL (Plavix) 75 mg tab Plavix 75 mg tablet   Take 1 tablet every day by oral route.  levothyroxine (SYNTHROID) 75 mcg tablet Take 1 Tab by mouth Daily (before breakfast). 90 Tab 1    aspirin (ASPIRIN) 325 mg tablet Take 1 Tab by mouth daily.  30 Tab 3     No current facility-administered medications on file prior to visit.         Past Surgical History:   Procedure Laterality Date    HX GYN      HX OVARIAN CYST REMOVAL      HX OVARIAN CYST REMOVAL Right        Family History   Problem Relation Age of Onset    Hypertension Mother     Dementia Father     Colon Cancer Father 80    No Known Problems Sister     No Known Problems Brother     No Known Problems Maternal Aunt     No Known Problems Maternal Uncle     No Known Problems Paternal Aunt     No Known Problems Paternal Uncle     Diabetes Maternal Grandmother     Cancer Maternal Grandfather     Cancer Paternal Grandmother     No Known Problems Paternal Grandfather      Reviewed and no changes     Social History     Socioeconomic History    Marital status:      Spouse name: Not on file    Number of children: Not on file    Years of education: Not on file    Highest education level: Not on file   Occupational History    Not on file   Social Needs    Financial resource strain: Not on file    Food insecurity     Worry: Not on file     Inability: Not on file    Transportation needs     Medical: Not on file     Non-medical: Not on file   Tobacco Use    Smoking status: Never Smoker    Smokeless tobacco: Never Used   Substance and Sexual Activity    Alcohol use: Not Currently     Comment: rarealy    Drug use: Never    Sexual activity: Yes     Partners: Male     Birth control/protection: Condom   Lifestyle    Physical activity     Days per week: Not on file     Minutes per session: Not on file    Stress: Not on file   Relationships    Social connections     Talks on phone: Not on file     Gets together: Not on file     Attends Caodaism service: Not on file     Active member of club or organization: Not on file     Attends meetings of clubs or organizations: Not on file     Relationship status: Not on file    Intimate partner violence     Fear of current or ex partner: Not on file     Emotionally abused: Not on file     Physically abused: Not on file     Forced sexual activity: Not on file   Other Topics Concern    Not on file   Social History Narrative    ** Merged History Encounter **               There were no vitals taken for this visit. Physical Examination:     Resp: normal respiratory effort, no audible wheezing. CV: patient does not feel palpitations or heart irregularity  Abd: patient does not feel abdominal tenderness or mass, patient does not notice distension  Extrem: patient does not see swelling in ankles or joints.    Neuro: Alert and oriented, able to answer questions without difficulty          Lab Results   Component Value Date/Time    WBC 5.1 12/08/2020 03:49 PM    HGB 12.8 12/08/2020 03:49 PM    HCT 37.9 12/08/2020 03:49 PM    PLATELET 386 79/57/5067 03:49 PM    MCV 91 12/08/2020 03:49 PM     Lab Results   Component Value Date/Time    Sodium 140 12/15/2020 08:00 AM    Potassium 4.1 12/15/2020 08:00 AM    Chloride 108 12/15/2020 08:00 AM    CO2 30 12/15/2020 08:00 AM    Anion gap 2 (L) 12/15/2020 08:00 AM    Glucose 82 12/15/2020 08:00 AM    BUN 19 12/15/2020 08:00 AM    Creatinine 0.76 12/15/2020 08:00 AM    BUN/Creatinine ratio 25 (H) 12/15/2020 08:00 AM    GFR est AA >60 12/15/2020 08:00 AM    GFR est non-AA >60 12/15/2020 08:00 AM    Calcium 8.8 12/15/2020 08:00 AM     Lab Results   Component Value Date/Time    Cholesterol, total 181 02/02/2021 09:35 AM    HDL Cholesterol 73 02/02/2021 09:35 AM    LDL, calculated 96 02/02/2021 09:35 AM    LDL, calculated 120 (H) 01/30/2018 08:01 AM    VLDL, calculated 12 02/02/2021 09:35 AM    VLDL, calculated 9 01/30/2018 08:01 AM    Triglyceride 62 02/02/2021 09:35 AM     Lab Results   Component Value Date/Time    TSH 1.610 02/02/2021 09:35 AM     No results found for: PSA, PSA2, PSAR1, Nyoka Muse, PSAR3, [id number], [id number]  No results found for: HBA1C, HGBE8, NRR4WSIA, OFP0BWSX, QNN1AFKH  Lab Results   Component Value Date/Time    VITAMIN D, 25-HYDROXY 45.5 10/15/2018 01:50 PM       Lab Results   Component Value Date/Time    ALT (SGPT) 10 12/08/2020 03:49 PM    Alk. phosphatase 87 12/08/2020 03:49 PM    Bilirubin, total <0.2 12/08/2020 03:49 PM           Assessment/Plan:    1. Irritable bowel syndrome, unspecified type     CT scan showed  Large stool burden and diverticulosis  Reviewed colonoscopy report 2019 which showed diverticulosis  Given chronicity of pain diagnosis is irritable bowel disease   Discussed possibly using linzess patient declined. Discussed dietary modification   Will send information to patient on foods that are \" \"low gas producing\"   Victor Baumgarten, MD    This is an established visit conducted via real time  Phone spent 10 minutes on the phone with patient. The patient has been instructed that this meets HIPAA criteria and acknowledges and agrees to this method of visitation.

## 2021-03-02 NOTE — TELEPHONE ENCOUNTER
MD Jose Gregg LPN; Bonny Anton   Caller: Unspecified (2 days ago,  4:17 PM)             We can schedule a virtual visit to discuss results and see next step. I am always willing to schedule a follow up visit to discuss results when patients are questioning a result or need further clarification   Protocol was followed meaning you called her  and results were sent if further clarification is needed an appointment can be offered - It is not feasible for me to call results on every image test I order. If I have an urgent result or very serious life threatening result I do call patients as you know. Spoke with patient. Two pt identifiers confirmed. Patient advised that Dr. Angelia Perez would like for her to schedule an appointment for a virtual or phone visit to discuss her test results. Patient states that she is on the way to visit her father, who has cancer but will be able at 2:30pm.  Patient states that she would prefer a phone call only. Advised patient that I will add her to the schedule. Pt verbalized understanding of information discussed w/ no further questions at this time.

## 2021-05-10 RX ORDER — LEVOTHYROXINE SODIUM 75 UG/1
TABLET ORAL
Qty: 90 TAB | Refills: 1 | Status: SHIPPED | OUTPATIENT
Start: 2021-05-10 | End: 2021-10-26 | Stop reason: SDUPTHER

## 2021-06-18 NOTE — TELEPHONE ENCOUNTER
Report given to floor. All questions answered.       Sariah Diaz [facility]  06/18/21 9811 Spoke to patient to confirm Diagnostic angiogram on 5/20/2020. Patient reminded to have COVID testing done tomorrow or Saturday the latest. Location and hours of operation given again. Patient stated understanding. No acute problems reported.

## 2021-08-03 NOTE — PROGRESS NOTES
07/01/20  Unable to reach patient for [facility] call. Episode resolved at this time. Per chart review patient had f/u CT this morning.  AR : Yes

## 2021-08-12 LAB — MAMMOGRAPHY, EXTERNAL: NORMAL

## 2021-08-30 ENCOUNTER — VIRTUAL VISIT (OUTPATIENT)
Dept: INTERNAL MEDICINE CLINIC | Age: 61
End: 2021-08-30
Payer: COMMERCIAL

## 2021-08-30 DIAGNOSIS — L65.9 HAIR THINNING: ICD-10-CM

## 2021-08-30 DIAGNOSIS — E55.9 VITAMIN D DEFICIENCY: ICD-10-CM

## 2021-08-30 DIAGNOSIS — E03.9 ACQUIRED HYPOTHYROIDISM: Primary | ICD-10-CM

## 2021-08-30 PROCEDURE — 99213 OFFICE O/P EST LOW 20 MIN: CPT | Performed by: INTERNAL MEDICINE

## 2021-08-30 RX ORDER — GUAIFENESIN 100 MG/5ML
81 LIQUID (ML) ORAL DAILY
COMMUNITY
End: 2022-02-09 | Stop reason: SDUPTHER

## 2021-08-30 NOTE — PROGRESS NOTES
Chief Complaint   Patient presents with    Thyroid Problem     1. Have you been to the ER, urgent care clinic since your last visit? Hospitalized since your last visit? No    2. Have you seen or consulted any other health care providers outside of the [address] [place] [address] since your last visit? Include any pap smears or colon screening.      Health Maintenance Due   Topic Date Due    Shingrix Vaccine Age 49> (1 of 2) Never done    DTaP/Tdap/Td series (1 - Tdap) 05/21/2011    COVID-19 Vaccine (2 - Moderna 2-dose series) 03/01/2021

## 2021-08-30 NOTE — PROGRESS NOTES
CC: Thyroid Problem      HPI:    She is a 59 y.o. female who presents for evaluation of hypothyroidism     She has been on levothyroxine for a long time. She has a diagnosis of hypothyroidism. She has a friend who is on [place] Thyroid and told her that it was a more natural way to treat hypothyroidism. She reports feeling a bit tired at the end of the day and having difficulty with sleep. She wonders if this is related to her menopause. She has seen endocrinologist in the past who recommended that she stays on levothyroxine. Does have some hair thinning but no hair loss. Denies constipation  Denies brittle nails  Weight is stable    This is an established visit conducted via telemedicine with video. The patient has been instructed that this meets HIPAA criteria and acknowledges and agrees to this method of visitation. Pursuant to the emergency declaration under the [facility] waiver authority and the [employer] Act, this Virtual Visit was conducted, with patient's consent, to reduce the patient's risk of exposure to COVID-19 and provide continuity of care for an established patient. Services were provided through a video synchronous discussion virtually to substitute for in-person clinic visit. ROS:  Constitutional: negative for fevers, chills, anorexia and weight loss  Eyes:   negative for visual disturbance,  irritation  ENT:   negative for tinnitus,sore throat,nasal congestion,ear pain, sinus pain.    Respiratory:  negative for cough, hemoptysis, dyspnea,wheezing  CV:   negative for chest pain, palpitations, lower extremity edema  GI:   negative for nausea, vomiting, diarrhea, abdominal pain,melena  Genitourinary: negative for frequency, dysuria, hematuria  Musculoskel: negative for myalgias, arthralgias, back pain, muscle weakness, joint pain  Neurological:  negative for headaches, dizziness, focal weakness, numbness  Psych:             Negative for depression and anxiety    Past Medical History:   Diagnosis Date    Aneurysm ([address] 06/15/2020    Coiled and stented    Anxiety     At risk for malignant hyperthermia     MH PRECAUTIONS    Gallbladder polyp     Hypothyroid     Neurological disorder        Current Outpatient Medications on File Prior to Visit   Medication Sig Dispense Refill    aspirin 81 mg chewable tablet Take 81 mg by mouth daily.  Synthroid 75 mcg tablet TAKE 1 TABLET ONCE DAILY   BEFORE BREAKFAST 90 Tab 1    clopidogreL (Plavix) 75 mg tab Plavix 75 mg tablet   Take 1 tablet every day by oral route. (Patient not taking: Reported on 8/30/2021)      aspirin (ASPIRIN) 325 mg tablet Take 1 Tab by mouth daily. (Patient not taking: Reported on 8/30/2021) 30 Tab 3     No current facility-administered medications on file prior to visit.        Past Surgical History:   Procedure Laterality Date    HX GYN      HX OVARIAN CYST REMOVAL      HX OVARIAN CYST REMOVAL Right        Family History   Problem Relation Age of Onset    Hypertension Mother     Dementia Father     Colon Cancer Father 80    No Known Problems Sister     No Known Problems Brother     No Known Problems Maternal Aunt     No Known Problems Maternal Uncle     No Known Problems Paternal Aunt     No Known Problems Paternal Uncle     Diabetes Maternal Grandmother     Cancer Maternal Grandfather     Cancer Paternal Grandmother     No Known Problems Paternal Grandfather      Reviewed and no changes     Social History     Socioeconomic History    Marital status:      Spouse name: Not on file    Number of children: Not on file    Years of education: Not on file    Highest education level: Not on file   Occupational History    Not on file   Tobacco Use    Smoking status: Never Smoker    Smokeless tobacco: Never Used   Substance and Sexual Activity    Alcohol use: Not Currently     Comment: mikala Tracey Drug use: Never    Sexual activity: Yes     Partners: Male     Birth control/protection: Condom   Other Topics Concern    Not on file   Social History Narrative    ** Merged History Encounter **          Social Determinants of Health     Financial Resource Strain:     Difficulty of Paying Living Expenses:    Food Insecurity:     Worried About Running Out of Food in the Last Year:     Ran Out of Food in the Last Year:    Transportation Needs:     Lack of Transportation (Medical):  Lack of Transportation (Non-Medical):    Physical Activity:     Days of Exercise per Week:     Minutes of Exercise per Session:    Stress:     Feeling of Stress :    Social Connections:     Frequency of Communication with Friends and Family:     Frequency of Social Gatherings with Friends and Family:     Attends [employer] Services:     Active Member of Clubs or Organizations:     Attends Club or Organization Meetings:     Marital Status:    Intimate Partner Violence:     Fear of Current or Ex-Partner:     Emotionally Abused:     Physically Abused:     Sexually Abused: There were no vitals taken for this visit. Physical Examination:   Gen: well appearing female  HEENT: normal conjunctiva, no audible congestion, patient does not see oral erythema, has MMM  Neck: patient does not feel enlarged or tender LAD or masses  Resp: normal respiratory effort, no audible wheezing. CV: patient does not feel palpitations or heart irregularity  Abd: patient does not feel abdominal tenderness or mass, patient does not notice distension  Extrem: patient does not see swelling in ankles or joints.    Neuro: Alert and oriented, able to answer questions without difficulty, able to move all extremities and walk normally          Lab Results   Component Value Date/Time    WBC 5.1 12/08/2020 03:49 PM    HGB 12.8 12/08/2020 03:49 PM    HCT 37.9 12/08/2020 03:49 PM    PLATELET 323 77/50/6865 03:49 PM    MCV 91 12/08/2020 03:49 PM Lab Results   Component Value Date/Time    Sodium 140 12/15/2020 08:00 AM    Potassium 4.1 12/15/2020 08:00 AM    Chloride 108 12/15/2020 08:00 AM    CO2 30 12/15/2020 08:00 AM    Anion gap 2 (L) 12/15/2020 08:00 AM    Glucose 82 12/15/2020 08:00 AM    BUN 19 12/15/2020 08:00 AM    Creatinine 0.76 12/15/2020 08:00 AM    BUN/Creatinine ratio 25 (H) 12/15/2020 08:00 AM    GFR est AA >60 12/15/2020 08:00 AM    GFR est non-AA >60 12/15/2020 08:00 AM    Calcium 8.8 12/15/2020 08:00 AM     Lab Results   Component Value Date/Time    Cholesterol, total 181 02/02/2021 09:35 AM    HDL Cholesterol 73 02/02/2021 09:35 AM    LDL, calculated 96 02/02/2021 09:35 AM    LDL, calculated 120 (H) 01/30/2018 08:01 AM    VLDL, calculated 12 02/02/2021 09:35 AM    VLDL, calculated 9 01/30/2018 08:01 AM    Triglyceride 62 02/02/2021 09:35 AM     Lab Results   Component Value Date/Time    TSH 1.610 02/02/2021 09:35 AM     No results found for: PSA, PSA2, PSAR1, Russell Clive, PSAR3, [id number], [id number]  No results found for: HBA1C, NVN9AULJ, HKW8PQZP, QVQ2JCRN  Lab Results   Component Value Date/Time    VITAMIN D, 25-HYDROXY 45.5 10/15/2018 01:50 PM       Lab Results   Component Value Date/Time    ALT (SGPT) 10 12/08/2020 03:49 PM    Alk. phosphatase 87 12/08/2020 03:49 PM    Bilirubin, total <0.2 12/08/2020 03:49 PM           Assessment/Plan:    1. Acquired hypothyroidism  Patient has been stable from long time on levothyroxine 75 mcg. She was wondering if it would be better to be on [place] Thyroid. I recommend she stays on levothyroxine if levels look good  - T4, FREE; Future  - TSH 3RD GENERATION; Future  - T4, FREE  - TSH 3RD GENERATION    2. Hair thinning  - FERRITIN; Future  - T4, FREE; Future  - TSH 3RD GENERATION; Future  - IRON PROFILE; Future  - METABOLIC PANEL, COMPREHENSIVE; Future  - CBC WITH AUTOMATED DIFF;  Future  - FERRITIN  - T4, FREE  - TSH 3RD GENERATION  - IRON PROFILE  - METABOLIC PANEL, COMPREHENSIVE  - CBC WITH AUTOMATED DIFF    3. Vitamin D deficiency  - VITAMIN D, 25 HYDROXY; Future  - VITAMIN D, 25 Dusty Valles MD    This is an established visit conducted via real time video and audio telemedicine. The patient has been instructed that this meets HIPAA criteria and acknowledges and agrees to this method of visitation.

## 2021-09-14 ENCOUNTER — APPOINTMENT (OUTPATIENT)
Dept: INTERNAL MEDICINE CLINIC | Age: 61
End: 2021-09-14

## 2021-09-15 LAB
25(OH)D3+25(OH)D2 SERPL-MCNC: 65.6 NG/ML (ref 30–100)
ALBUMIN SERPL-MCNC: 4.6 G/DL (ref 3.8–4.8)
ALBUMIN/GLOB SERPL: 1.6 {RATIO} (ref 1.2–2.2)
ALP SERPL-CCNC: 94 IU/L (ref 44–121)
ALT SERPL-CCNC: 12 IU/L (ref 0–32)
AST SERPL-CCNC: 16 IU/L (ref 0–40)
BASOPHILS # BLD AUTO: 0 X10E3/UL (ref 0–0.2)
BASOPHILS NFR BLD AUTO: 1 %
BILIRUB SERPL-MCNC: 0.4 MG/DL (ref 0–1.2)
BUN SERPL-MCNC: 19 MG/DL (ref 8–27)
BUN/CREAT SERPL: 28 (ref 12–28)
CALCIUM SERPL-MCNC: 10.1 MG/DL (ref 8.7–10.3)
CHLORIDE SERPL-SCNC: 104 MMOL/L (ref 96–106)
CO2 SERPL-SCNC: 28 MMOL/L (ref 20–29)
CREAT SERPL-MCNC: 0.69 MG/DL (ref 0.57–1)
EOSINOPHIL # BLD AUTO: 0.1 X10E3/UL (ref 0–0.4)
EOSINOPHIL NFR BLD AUTO: 2 %
ERYTHROCYTE [DISTWIDTH] IN BLOOD BY AUTOMATED COUNT: 13.3 % (ref 11.7–15.4)
FERRITIN SERPL-MCNC: 29 NG/ML (ref 15–150)
GLOBULIN SER CALC-MCNC: 2.9 G/DL (ref 1.5–4.5)
GLUCOSE SERPL-MCNC: 92 MG/DL (ref 65–99)
HCT VFR BLD AUTO: 41.7 % (ref 34–46.6)
HGB BLD-MCNC: 13.9 G/DL (ref 11.1–15.9)
IMM GRANULOCYTES # BLD AUTO: 0 X10E3/UL (ref 0–0.1)
IMM GRANULOCYTES NFR BLD AUTO: 0 %
IRON SATN MFR SERPL: 30 % (ref 15–55)
IRON SERPL-MCNC: 100 UG/DL (ref 27–139)
LYMPHOCYTES # BLD AUTO: 1.6 X10E3/UL (ref 0.7–3.1)
LYMPHOCYTES NFR BLD AUTO: 41 %
MCH RBC QN AUTO: 31 PG (ref 26.6–33)
MCHC RBC AUTO-ENTMCNC: 33.3 G/DL (ref 31.5–35.7)
MCV RBC AUTO: 93 FL (ref 79–97)
MONOCYTES # BLD AUTO: 0.2 X10E3/UL (ref 0.1–0.9)
MONOCYTES NFR BLD AUTO: 6 %
NEUTROPHILS # BLD AUTO: 1.9 X10E3/UL (ref 1.4–7)
NEUTROPHILS NFR BLD AUTO: 50 %
PLATELET # BLD AUTO: 316 X10E3/UL (ref 150–450)
POTASSIUM SERPL-SCNC: 5.4 MMOL/L (ref 3.5–5.2)
PROT SERPL-MCNC: 7.5 G/DL (ref 6–8.5)
RBC # BLD AUTO: 4.49 X10E6/UL (ref 3.77–5.28)
SODIUM SERPL-SCNC: 142 MMOL/L (ref 134–144)
T4 FREE SERPL-MCNC: 1.76 NG/DL (ref 0.82–1.77)
TIBC SERPL-MCNC: 337 UG/DL (ref 250–450)
TSH SERPL DL<=0.005 MIU/L-ACNC: 0.52 UIU/ML (ref 0.45–4.5)
UIBC SERPL-MCNC: 237 UG/DL (ref 118–369)
WBC # BLD AUTO: 3.8 X10E3/UL (ref 3.4–10.8)

## 2021-09-24 NOTE — PROGRESS NOTES
Thyroid function is normal  Vitamin D is normal  Normal blood count  Potassium is elevated suspect blood hemolyzed ( lab error)  Normal liver function test

## 2021-10-26 ENCOUNTER — TELEPHONE (OUTPATIENT)
Dept: INTERNAL MEDICINE CLINIC | Age: 61
End: 2021-10-26

## 2021-10-26 NOTE — TELEPHONE ENCOUNTER
----- Message from Gloria Gregory sent at 10/26/2021 12:30 PM EDT -----  Regarding: Update Medical Information  Contact: [phone]  I am checking to see if the office has my updated information. I recently changed over to my 's insurance, [employer]. Member ID [id number]  Group # [id number]    My mail order prescription for synthroid also needs this new card. I have misplaced the phone number for them. Can you help? This office has renewed this mail order prescription for me in the past so you should have their info. Please respond.  Thank you,  Ashley Pardo

## 2021-11-09 ENCOUNTER — OFFICE VISIT (OUTPATIENT)
Dept: INTERNAL MEDICINE CLINIC | Age: 61
End: 2021-11-09
Payer: COMMERCIAL

## 2021-11-09 VITALS
HEART RATE: 60 BPM | WEIGHT: 110 LBS | HEIGHT: 63 IN | RESPIRATION RATE: 18 BRPM | SYSTOLIC BLOOD PRESSURE: 110 MMHG | TEMPERATURE: 97.1 F | DIASTOLIC BLOOD PRESSURE: 73 MMHG | BODY MASS INDEX: 19.49 KG/M2 | OXYGEN SATURATION: 100 %

## 2021-11-09 DIAGNOSIS — G89.29 CHRONIC RIGHT-SIDED LOW BACK PAIN WITH RIGHT-SIDED SCIATICA: ICD-10-CM

## 2021-11-09 DIAGNOSIS — R29.898 WEAKNESS OF RIGHT FOOT: ICD-10-CM

## 2021-11-09 DIAGNOSIS — M54.41 CHRONIC RIGHT-SIDED LOW BACK PAIN WITH RIGHT-SIDED SCIATICA: ICD-10-CM

## 2021-11-09 DIAGNOSIS — R00.2 PALPITATIONS: Primary | ICD-10-CM

## 2021-11-09 DIAGNOSIS — R20.8 DECREASED SENSATION OF FOOT: ICD-10-CM

## 2021-11-09 PROCEDURE — 99214 OFFICE O/P EST MOD 30 MIN: CPT | Performed by: INTERNAL MEDICINE

## 2021-11-09 PROCEDURE — 93000 ELECTROCARDIOGRAM COMPLETE: CPT | Performed by: INTERNAL MEDICINE

## 2021-11-09 RX ORDER — LEVOTHYROXINE SODIUM 75 UG/1
TABLET ORAL
Qty: 90 TABLET | Refills: 1 | Status: SHIPPED | OUTPATIENT
Start: 2021-11-09 | End: 2021-12-28 | Stop reason: SDUPTHER

## 2021-11-09 NOTE — PATIENT INSTRUCTIONS
Sciatica: Exercises  Introduction  Here are some examples of typical rehabilitation exercises for your condition. Start each exercise slowly. Ease off the exercise if you start to have pain. Your doctor or physical therapist will tell you when you can start these exercises and which ones will work best for you. When you are not being active, find a comfortable position for rest. Some people are comfortable on the floor or a medium-firm bed with a small pillow under their head and another under their knees. Some people prefer to lie on their side with a pillow between their knees. Don't stay in one position for too long. Take short walks (10 to 20 minutes) every 2 to 3 hours. Avoid slopes, hills, and stairs until you feel better. Walk only distances you can manage without pain, especially leg pain. How to do the exercises  Back stretches    1. Get down on your hands and knees on the floor. 2. Relax your head and allow it to droop. Round your back up toward the ceiling until you feel a nice stretch in your upper, middle, and lower back. Hold this stretch for as long as it feels comfortable, or about 15 to 30 seconds. 3. Return to the starting position with a flat back while you are on your hands and knees. 4. Let your back sway by pressing your stomach toward the floor. Lift your buttocks toward the ceiling. 5. Hold this position for 15 to 30 seconds. 6. Repeat 2 to 4 times. Follow-up care is a key part of your treatment and safety. Be sure to make and go to all appointments, and call your doctor if you are having problems. It's also a good idea to know your test results and keep a list of the medicines you take. Where can you learn more? Go to http://www.gray.com/  Enter B715 in the search box to learn more about \"Sciatica: Exercises. \"  Current as of: July 1, 2021               Content Version: 13.0  © 6030-1601 Healthwise, Incorporated.    Care instructions adapted under license by [address] (which disclaims liability or warranty for this information). If you have questions about a medical condition or this instruction, always ask your healthcare professional. [address] any warranty or liability for your use of this information. Sciatica: Exercises  Introduction  Here are some examples of typical rehabilitation exercises for your condition. Start each exercise slowly. Ease off the exercise if you start to have pain. Your doctor or physical therapist will tell you when you can start these exercises and which ones will work best for you. When you are not being active, find a comfortable position for rest. Some people are comfortable on the floor or a medium-firm bed with a small pillow under their head and another under their knees. Some people prefer to lie on their side with a pillow between their knees. Don't stay in one position for too long. Take short walks (10 to 20 minutes) every 2 to 3 hours. Avoid slopes, hills, and stairs until you feel better. Walk only distances you can manage without pain, especially leg pain. How to do the exercises  Back stretches    7. Get down on your hands and knees on the floor. 8. Relax your head and allow it to droop. Round your back up toward the ceiling until you feel a nice stretch in your upper, middle, and lower back. Hold this stretch for as long as it feels comfortable, or about 15 to 30 seconds. 9. Return to the starting position with a flat back while you are on your hands and knees. 10. Let your back sway by pressing your stomach toward the floor. Lift your buttocks toward the ceiling. 11. Hold this position for 15 to 30 seconds. 12. Repeat 2 to 4 times. Follow-up care is a key part of your treatment and safety. Be sure to make and go to all appointments, and call your doctor if you are having problems.  It's also a good idea to know your test results and keep a list of the medicines you take. Where can you learn more? Go to http://www.gray.com/  Enter Y006 in the search box to learn more about \"Sciatica: Exercises. \"  Current as of: July 1, 2021               Content Version: 13.0  © 7724-2608 Healthwise, Incorporated. Care instructions adapted under license by [employer] (which disclaims liability or warranty for this information). If you have questions about a medical condition or this instruction, always ask your healthcare professional. [address] any warranty or liability for your use of this information.

## 2021-11-09 NOTE — PROGRESS NOTES
Ms. Analilia Dash is presenting to follow up     CC:  Irregular Heart Beat (off and on palpitations), Foot Pain (right foot tingling and pain x 2 months), and Tingling (right hand and foot tingling)       HPI:      Right foot with tingling on the bottom at times has felt that foot is detached and has no control over the foot. Onset in September and has happened on and off  Does have back pain  Right leg feels ache     Also compalins of palpitations on and off   Jun 15th last year had aneurysm of brain Janelle 15 2020 Dr Ismael Lezama  Review of systems:  Constitutional: negative for fever, chills, weight loss, night sweats   10 systems reviewed and negative other then HPI       Past Medical History:   Diagnosis Date    Aneurysm ([address] 06/15/2020    Coiled and stented    Anxiety     At risk for malignant hyperthermia     MH PRECAUTIONS    Gallbladder polyp     Hypothyroid     Neurological disorder         Past Surgical History:   Procedure Laterality Date    HX GYN      HX OVARIAN CYST REMOVAL      HX OVARIAN CYST REMOVAL Right        Allergies   Allergen Reactions    Acyclovir Nausea and Vomiting     Other reaction(s): Vomiting    Morphine Nausea and Vomiting    Pseudoephedrine Hcl Unknown (comments)    Pseudoephedrine Palpitations     Other reaction(s): Unknown (comments)       Current Outpatient Medications on File Prior to Visit   Medication Sig Dispense Refill    aspirin 81 mg chewable tablet Take 81 mg by mouth daily.  Synthroid 75 mcg tablet TAKE 1 TABLET ONCE DAILY   BEFORE BREAKFAST 90 Tab 1    clopidogreL (Plavix) 75 mg tab Plavix 75 mg tablet   Take 1 tablet every day by oral route. (Patient not taking: Reported on 8/30/2021)      aspirin (ASPIRIN) 325 mg tablet Take 1 Tab by mouth daily. (Patient not taking: Reported on 8/30/2021) 30 Tab 3     No current facility-administered medications on file prior to visit.        family history includes Cancer in her maternal grandfather and paternal grandmother; Colon Cancer (age of onset: 80) in her father; Dementia in her father; Diabetes in her maternal grandmother; Hypertension in her mother; No Known Problems in her brother, maternal aunt, maternal uncle, paternal aunt, paternal grandfather, paternal uncle, and sister. Social History     Socioeconomic History    Marital status:      Spouse name: Not on file    Number of children: Not on file    Years of education: Not on file    Highest education level: Not on file   Occupational History    Not on file   Tobacco Use    Smoking status: Never Smoker    Smokeless tobacco: Never Used   Substance and Sexual Activity    Alcohol use: Not Currently     Comment: rarealy    Drug use: Never    Sexual activity: Yes     Partners: Male     Birth control/protection: Condom   Other Topics Concern    Not on file   Social History Narrative    ** Merged History Encounter **          Social Determinants of Health     Financial Resource Strain:     Difficulty of Paying Living Expenses: Not on file   Food Insecurity:     Worried About Running Out of Food in the Last Year: Not on file    Eren of Food in the Last Year: Not on file   Transportation Needs:     Lack of Transportation (Medical): Not on file    Lack of Transportation (Non-Medical):  Not on file   Physical Activity:     Days of Exercise per Week: Not on file    Minutes of Exercise per Session: Not on file   Stress:     Feeling of Stress : Not on file   Social Connections:     Frequency of Communication with Friends and Family: Not on file    Frequency of Social Gatherings with Friends and Family: Not on file    Attends Sabianist Services: Not on file    Active Member of Clubs or Organizations: Not on file    Attends Club or Organization Meetings: Not on file    Marital Status: Not on file   Intimate Partner Violence:     Fear of Current or Ex-Partner: Not on file    Emotionally Abused: Not on file    Physically Abused: Not on file    Sexually Abused: Not on file   Housing Stability:     Unable to Pay for Housing in the Last Year: Not on file    Number of Places Lived in the Last Year: Not on file    Unstable Housing in the Last Year: Not on file       Visit Vitals  /73 (BP 1 Location: Left upper arm, BP Patient Position: Sitting, BP Cuff Size: Adult)   Pulse 60   Temp 97.1 °F (36.2 °C) (Temporal)   Resp 18   Ht 5' 2.5\" (1.588 m)   Wt 110 lb (49.9 kg)   SpO2 100%   BMI 19.80 kg/m²     General:  Well appearing female no acute distress  HEENT:   PERRL,normal conjunctiva. Neck:  Supple. Thyroid normal size, nontender, without nodules. No carotid bruit. No masses or lymphadenopathy  Respiratory: no respiratory distress,  no wheezing, no rhonchi, no rales. No chest wall tenderness. Cardiovascular:  RRR, normal S1S2, no murmur. Gastrointestinal: normal bowel sounds, soft, nontender, without masses. No hepatosplenomegaly. Extremities +2 pulses, no edema, normal sensation   Musculoskeletal:  Normal gait. Normal digits and nails. Normal strength and tone, no atrophy, and no abnormal movement. Skin:  No rash, no lesions, no ulcers. Skin warm, normal turgor, without induration or nodules. Neuro:  A and OX4, fluent speech, cranial nerves normal 2-12.   Sensation decreased on right foot, decreased reflex on R achilles   Decreased reflex on R knee     Psych:  Normal affect      Lab Results   Component Value Date/Time    WBC 3.8 09/14/2021 12:00 AM    HGB 13.9 09/14/2021 12:00 AM    HCT 41.7 09/14/2021 12:00 AM    PLATELET 764 51/96/0967 12:00 AM    MCV 93 09/14/2021 12:00 AM     Lab Results   Component Value Date/Time    Sodium 142 09/14/2021 12:00 AM    Potassium 5.4 (H) 09/14/2021 12:00 AM    Chloride 104 09/14/2021 12:00 AM    CO2 28 09/14/2021 12:00 AM    Anion gap 2 (L) 12/15/2020 08:00 AM    Glucose 92 09/14/2021 12:00 AM    BUN 19 09/14/2021 12:00 AM    Creatinine 0.69 09/14/2021 12:00 AM    BUN/Creatinine ratio 28 09/14/2021 12:00 AM    GFR est  09/14/2021 12:00 AM    GFR est non-AA 93 09/14/2021 12:00 AM    Calcium 10.1 09/14/2021 12:00 AM     Lab Results   Component Value Date/Time    Cholesterol, total 181 02/02/2021 09:35 AM    HDL Cholesterol 73 02/02/2021 09:35 AM    LDL, calculated 96 02/02/2021 09:35 AM    LDL, calculated 120 (H) 01/30/2018 08:01 AM    VLDL, calculated 12 02/02/2021 09:35 AM    VLDL, calculated 9 01/30/2018 08:01 AM    Triglyceride 62 02/02/2021 09:35 AM     Lab Results   Component Value Date/Time    TSH 0.523 09/14/2021 12:00 AM     No results found for: HBA1C, GDL9LYHS, LWU5SVBI, TAQ7WVGT  Lab Results   Component Value Date/Time    VITAMIN D, 25-HYDROXY 65.6 09/14/2021 12:00 AM                   Assessment and Plan:     1. Palpitations  - normal EKG today  -normal exam  - holter ordered  - AMB POC EKG ROUTINE W/ 12 LEADS, INTER & REP    2. Intermittent Weakness of right foot  3. Decreased sensation of foot  Given symptoms of intermittent weakness of right foot and leg pain suspect nerve issue / radiculopathy will obtain imaging       4.  Chronic right-sided low back pain with right-sided sciatica  - MRI LUMB SPINE WO CONT; Future  - REFERRAL TO NEUROLOGY             Anamika Arzate MD

## 2021-12-02 ENCOUNTER — TELEPHONE (OUTPATIENT)
Dept: INTERNAL MEDICINE CLINIC | Age: 61
End: 2021-12-02

## 2021-12-02 NOTE — TELEPHONE ENCOUNTER
----- Message from Toribio Wharton sent at 12/2/2021 10:05 AM EST -----  Subject: Message to Provider    QUESTIONS  Information for Provider? needs an order put in for MRI of the lombar   spine without contrast to fax to medical [facility]   ([phone]) PH? ([phone])   ---------------------------------------------------------------------------  --------------  Hien BOUCHER  What is the best way for the office to contact you? OK to leave message on   voicemail  Preferred Call Back Phone Number? [phone]  ---------------------------------------------------------------------------  --------------  SCRIPT ANSWERS  Relationship to Patient?  Self

## 2021-12-06 ENCOUNTER — HOSPITAL ENCOUNTER (OUTPATIENT)
Dept: NON INVASIVE DIAGNOSTICS | Age: 61
Discharge: HOME | End: 2021-12-06
Attending: INTERNAL MEDICINE
Payer: COMMERCIAL

## 2021-12-06 DIAGNOSIS — R00.2 PALPITATIONS: ICD-10-CM

## 2021-12-06 PROCEDURE — 93225 XTRNL ECG REC<48 HRS REC: CPT

## 2021-12-09 ENCOUNTER — PATIENT MESSAGE (OUTPATIENT)
Dept: INTERNAL MEDICINE CLINIC | Age: 61
End: 2021-12-09

## 2021-12-09 DIAGNOSIS — E03.9 ACQUIRED HYPOTHYROIDISM: Primary | ICD-10-CM

## 2021-12-10 PROCEDURE — 93227 XTRNL ECG REC<48 HR R&I: CPT | Performed by: INTERNAL MEDICINE

## 2021-12-13 DIAGNOSIS — I49.3 PVC (PREMATURE VENTRICULAR CONTRACTION): ICD-10-CM

## 2021-12-13 DIAGNOSIS — R00.2 PALPITATIONS: Primary | ICD-10-CM

## 2021-12-13 NOTE — PROGRESS NOTES
Good afternoon Ms Bailey Sánchez  Results are in   Few premature beats of atria and rare of ventricle. These are few and did not correlate with symptoms. These are typically benign. Lets check a magnesium level and see if that is normal.   Other electrolytes checked are normal  I dont think the low frequency warrants a BB blocker which is a medication that typically reduces this but will also lower blood pressure.   I can refer to cardiology if you desire further evaluation

## 2021-12-20 ENCOUNTER — LAB ONLY (OUTPATIENT)
Dept: INTERNAL MEDICINE CLINIC | Age: 61
End: 2021-12-20

## 2021-12-20 DIAGNOSIS — I49.3 PVC (PREMATURE VENTRICULAR CONTRACTION): ICD-10-CM

## 2021-12-20 DIAGNOSIS — R00.2 PALPITATIONS: ICD-10-CM

## 2021-12-20 LAB — MAGNESIUM SERPL-MCNC: 2.1 MG/DL (ref 1.6–2.4)

## 2021-12-27 DIAGNOSIS — I67.1 CEREBRAL ANEURYSM: Primary | ICD-10-CM

## 2021-12-28 NOTE — TELEPHONE ENCOUNTER
PCP: Sen Grove MD    Last appt: 11/9/2021  Future Appointments   Date Time Provider Curtis Chewi   1/7/2022  1:30 PM [facility] MRI 1 [facility] REG   2/8/2022 10:00 AM Appa Darrick Bartlett MD [facility] BS AMB       Requested Prescriptions     Pending Prescriptions Disp Refills    levothyroxine (Synthroid) 75 mcg tablet 90 Tablet 1     Sig: TAKE 1 TABLET ONCE DAILY   BEFORE BREAKFAST

## 2021-12-28 NOTE — TELEPHONE ENCOUNTER
From: Henry Tristan  To: Inna Alanis MD  Sent: 12/9/2021 5:35 PM EST  Subject: thyroid medicine    I need to have Dr. San Hamman call this number to prescribe for refills on my synthroid TAB 75 MCG prescription # [id number] through the mail order that I use. The company is [employer]. They said there is a # on the back of my card, but I don't have a member card from them. This is the number from my [employer] card for pharmacies. I do not want to order through my pharmacy. It is much more expensive. Here is the number from my insurance card and let them know it is through [facility] [employer] order. [phone].  Thank you so much.  ~Marisela Akins

## 2021-12-29 RX ORDER — LEVOTHYROXINE SODIUM 75 UG/1
TABLET ORAL
Qty: 90 TABLET | Refills: 1 | Status: SHIPPED | OUTPATIENT
Start: 2021-12-29 | End: 2022-05-25 | Stop reason: SDUPTHER

## 2022-01-07 ENCOUNTER — PATIENT MESSAGE (OUTPATIENT)
Dept: INTERNAL MEDICINE CLINIC | Age: 62
End: 2022-01-07

## 2022-01-07 ENCOUNTER — HOSPITAL ENCOUNTER (OUTPATIENT)
Dept: MRI IMAGING | Age: 62
Discharge: HOME | End: 2022-01-07
Attending: RADIOLOGY
Payer: COMMERCIAL

## 2022-01-07 DIAGNOSIS — I67.1 CEREBRAL ANEURYSM, NONRUPTURED: ICD-10-CM

## 2022-01-07 PROCEDURE — A9576 INJ PROHANCE MULTIPACK: HCPCS | Performed by: RADIOLOGY

## 2022-01-07 PROCEDURE — 70546 MR ANGIOGRAPH HEAD W/O&W/DYE: CPT

## 2022-01-07 PROCEDURE — 74011250636 HC RX REV CODE- 250/636: Performed by: RADIOLOGY

## 2022-01-07 RX ADMIN — GADOTERIDOL 15 ML: 279.3 INJECTION, SOLUTION INTRAVENOUS at 13:59

## 2022-01-07 NOTE — TELEPHONE ENCOUNTER
MR form was faxed to [facility]. Fax #[phone]  Fax was confirmed, it did go through. Patient notified via [employer].    Signed By: Patrice Charles LPN     January 7, 1931

## 2022-01-10 ENCOUNTER — PATIENT MESSAGE (OUTPATIENT)
Dept: INTERNAL MEDICINE CLINIC | Age: 62
End: 2022-01-10

## 2022-01-10 DIAGNOSIS — M48.061 FORAMINAL STENOSIS OF LUMBAR REGION: Primary | ICD-10-CM

## 2022-01-10 NOTE — TELEPHONE ENCOUNTER
Remy Levin 1/10/2022 11:04 AM EST      ----- Message -----  From: Joanne Henderson  Sent: 1/10/2022 10:41 AM EST  To: [facility] Nurse Pool  Subject: MRI results     I would like to be referred to a back specialist. Please see my previous note to you. Also, I want to follow up on that cyst that is still there. Would it be better to go through my GYN? I can call her today to let her know about it and have her set something up for me.

## 2022-01-11 ENCOUNTER — TELEPHONE (OUTPATIENT)
Dept: INTERNAL MEDICINE CLINIC | Age: 62
End: 2022-01-11

## 2022-01-11 NOTE — TELEPHONE ENCOUNTER
Fax was confirmed, it did go through to 's office.    Signed By: Yomi Reese LPN     January 11, 4492

## 2022-01-11 NOTE — TELEPHONE ENCOUNTER
I spoke to Hayn at [facility] to get fax # for [facility] Baljit's office. Fax #[phone]. Last office visit note, labs and MRI were faxed to the above #.    Signed By: Georges Dong LPN     January 11, 5257

## 2022-02-01 ENCOUNTER — VIRTUAL VISIT (OUTPATIENT)
Dept: NEUROSURGERY | Age: 62
End: 2022-02-01
Payer: COMMERCIAL

## 2022-02-01 DIAGNOSIS — G45.9 TIA (TRANSIENT ISCHEMIC ATTACK): ICD-10-CM

## 2022-02-01 DIAGNOSIS — I67.1 CEREBRAL ANEURYSM: Primary | ICD-10-CM

## 2022-02-01 PROCEDURE — 99443 PR PHYS/QHP TELEPHONE EVALUATION 21-30 MIN: CPT | Performed by: RADIOLOGY

## 2022-02-01 RX ORDER — GUAIFENESIN 100 MG/5ML
162 LIQUID (ML) ORAL DAILY
Qty: 60 TABLET | Refills: 5 | Status: SHIPPED | OUTPATIENT
Start: 2022-02-01

## 2022-02-01 NOTE — PROGRESS NOTES
Phone call to patient in preparation for Virtual/phone visit with provider. Name and  verified. Follow up for Cerebral aneurysm and Pulsatile tinnitus bilateral.  Patient reports constant ringing in both ears now. Denies headaches, dizziness, blurred or double vision, numbness or tingling. Reports she is due for her annual Ophthalmology visit. No no acute problems reported. THI = 22. New orders noted.

## 2022-02-01 NOTE — PATIENT INSTRUCTIONS
Learning About How Hospitals and Clinics Keep You Safe From COVID-19  Overview     Many hospitals and clinics now are treating people who are infected with COVID-19. So if you're in the hospital or clinic for any other reason, this may be an unsettling time. It's common to be concerned about becoming infected with the virus. But hospitals and clinics have policies to prevent the spread of infections. For example, doctors and nurses are trained to wash their hands before they treat you. Health care centers have stepped up these policies now. They are taking further steps to protect their patients. As long as KWTRX-02 remains a public health problem, things are going to be different when you go to a health care facility. They may have new rules for your safety. These could include having you wear a cloth face cover, meeting you outside the clinic, and having you sit away from others in the waiting room. What are hospitals and clinics doing to keep you safe? Your care team is very aware of the threat of COVID-19. They are doing everything they can to keep you safe. Hospitals and clinics may have different policies. But in general, you may expect many of these measures:  · You will be screened for COVID-19. When you come to the hospital, you may have your temperature taken. You'll be asked about any symptoms, such as a fever, a cough, or shortness of breath. You may be asked if you've had contact with anyone who's been diagnosed with the virus. And you may be asked if you've traveled to any place that has had an outbreak. · The staff may try to do as much as possible outside the facility. For example, you may be asked to fill out paperwork online or in your car before you come inside. The person giving you a ride home may be asked to wait outside. These new rules to help protect you may make routine tasks take longer than usual.  · People who have COVID-19 are treated in a separate area.  Many hospitals and clinics have staff members who treat only these patients. This helps limit the spread of the infection. · Visitors may be limited. In some cases, no visitors are allowed. In others, only one healthy visitor is allowed. Children may be limited to having only one adult with them. You can connect with family and friends using your phone or computer. If you need something brought from home, such as glasses or a phone , find out where the item can be dropped off. · The hospital or clinic follows guidelines to prevent infection. These include:  ? Washing hands often. ? Disinfecting high-touch surfaces. ? Wearing face masks or other protective equipment. ? Making extra space for social distancing. What can you do to stay safe? We all have a role to play in keeping ourselves safe and preventing the spread of COVID-19. Here are some things you can do while you're receiving care. If you're in a hospital, stay in your room. This will limit your exposure to the virus. It may be boring, but it's the safest place for you. Wash your hands often. Use soap and water, and scrub for 20 seconds. Then rinse and dry them well. Always wash them after you use the bathroom, before you eat, and after you cough, sneeze, or blow your nose. If you can't wash your hands, use hand . Speak up if you have safety concerns. Don't be shy to correct health care workers if they aren't washing their hands properly, wearing face masks, or taking other precautions. These actions are vital to prevent the spread of infection. Try to be understanding. This is a stressful time for everyone, including your care team. They are doing their best to keep you safe and provide you with good care. Where can you learn more? Go to http://www.gray.com/  Enter A134 in the search box to learn more about \"Learning About How Hospitals and [facility] Safe From COVID-19. \"  Current as of: March 26, 2021               Content Version: 13.0  © 1375-0465 Healthwise, Incorporated. Care instructions adapted under license by [employer] (which disclaims liability or warranty for this information). If you have questions about a medical condition or this instruction, always ask your healthcare professional. [address] any warranty or liability for your use of this information.

## 2022-02-02 NOTE — PROGRESS NOTES
Neurointerventional Surgery Clinic Note    Rosalia Nolbe is a 59 y.o. female who was seen by telephone on 2/1/2022. Consent: Rosalia Noble, who was seen by telephone, and/or her healthcare decision maker, is aware that this patient-initiated, Telehealth encounter on 2/1/2022 is a billable service, with coverage as determined by her insurance carrier. She is aware that she may receive a bill and has provided verbal consent to proceed: Yes. Assessment & Plan:   59 y.o. 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 MRA brain performed 01/07/2022 shows a patent stent and no significant residual or recurrent aneurysm filling. 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 Patient states that the ringing in her ears is still present, possibly a little worse in the left ear. She says that it is worse with stress, but it is not pulsating anymore. She denies headaches but reported occasional transient episodes of sensory changes in her right arm, hand, and foot. The last time she experiencing the symptoms was in October 2021. She sought care and there was suspicion that this could be due to herniated disks. Subsequently, a lumbar spine MRI was performed at an outside institution. I do not have access to these images, however I reviewed the spine on the images of the CTA head and neck performed 10/7/2020 as well as the CT abdomen and pelvis performed 2/18/2021. There was no significant spine degeneration on the CT examinations, so I suspect that her recent issues are not related to degenerative disc related nerve impingement. It is possible that she has been experiencing TIAs. Patient's aneurysm treatment was performed on 6/15/2020. She was maintained on dual antiplatelet therapy for 6 months after treatment. In December 2020, she stopped the Plavix. She continued aspirin 325 mg daily for another 6 months thereafter. As of June 2021, she has been on aspirin 81 mg daily. I will order an MRI of the brain and refer her to Neurology for further evaluation. We will increase her aspirin to 162 mg daily. We will obtain an MRA brain with and without contrast in 1 year for imaging surveillance of her treated left ICA P-comm aneurysm. I advised the patient to seek emergent medical care if she experiences the worst headache of her life or new/recurrent neurological symptoms. She expressed understanding and is in agreement with this plan. Plan:  -Increase aspirin to 162 mg daily  -MRI brain now  -Referral to Neurology  -MRA brain with and without contrast in 1 year    I spent at least 30 minutes on this visit with this established patient. Subjective:   Darell Ren is a 59 y.o. 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 MRA brain performed 01/07/2022 shows a patent stent and no significant residual or recurrent aneurysm filling. 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 Patient states that the ringing in her ears is still present, possibly a little worse in the left ear. She says that it is worse with stress. She has had this symptom for about 9 years. However, she reports that it is not pulsating anymore. She denies headaches, nausea and vomiting, weakness, dizziness, problems with balance and coordination, and double or blurry vision. She has occasional numbness and tingling in her right arm and hand and occasional tingling in the right foot.   She also had an episode of her right foot feeling strange transiently in September or October 2021. She sought care and there was suspicion that this could be due to herniated disks. Subsequently, a lumbar spine MRI was performed at an outside institution. Chief Complaint: Cerebral Aneurysm (Follow up) and Ringing in Ear      Prior to Admission medications    Medication Sig Start Date End Date Taking? Authorizing Provider   aspirin 81 mg chewable tablet Take 2 Tablets by mouth daily. Indications: Cerebral aneurysm 2/1/22  Yes Cece Saenz MD   levothyroxine (Synthroid) 75 mcg tablet TAKE 1 TABLET ONCE DAILY   BEFORE BREAKFAST 12/29/21  Yes Kiana Alanis MD   aspirin 81 mg chewable tablet Take 81 mg by mouth daily. Yes Provider, Historical     Allergies   Allergen Reactions    Acyclovir Nausea and Vomiting     Other reaction(s): Vomiting    Morphine Nausea and Vomiting    Pseudoephedrine Hcl Unknown (comments)    Pseudoephedrine Palpitations     Other reaction(s): Unknown (comments)       Patient Active Problem List   Diagnosis Code    Bronchitis J40    Gallbladder polyp K82.4    Non-intractable vomiting with nausea R11.2    Pulsatile tinnitus of left ear H93. A2    Cerebral aneurysm I67.1    Cerebral aneurysm, nonruptured I67.1     Patient Active Problem List    Diagnosis Date Noted    Cerebral aneurysm, nonruptured 06/15/2020    Cerebral aneurysm 06/02/2020    Pulsatile tinnitus of left ear 08/20/2019    Non-intractable vomiting with nausea 01/31/2018    Gallbladder polyp     Bronchitis 11/06/2015     Current Outpatient Medications   Medication Sig Dispense Refill    aspirin 81 mg chewable tablet Take 2 Tablets by mouth daily. Indications: Cerebral aneurysm 60 Tablet 5    levothyroxine (Synthroid) 75 mcg tablet TAKE 1 TABLET ONCE DAILY   BEFORE BREAKFAST 90 Tablet 1    aspirin 81 mg chewable tablet Take 81 mg by mouth daily.        Allergies   Allergen Reactions    Acyclovir Nausea and Vomiting     Other reaction(s): Vomiting    Morphine Nausea and Vomiting    Pseudoephedrine Hcl Unknown (comments)    Pseudoephedrine Palpitations     Other reaction(s): Unknown (comments)     Past Medical History:   Diagnosis Date    Aneurysm ([address] 06/15/2020    Coiled and stented    Anxiety     At risk for malignant hyperthermia     MH PRECAUTIONS    Gallbladder polyp     Hypothyroid     Neurological disorder      Past Surgical History:   Procedure Laterality Date    HX GYN      HX OVARIAN CYST REMOVAL      HX OVARIAN CYST REMOVAL Right      Family History   Problem Relation Age of Onset    Hypertension Mother     Dementia Father     Colon Cancer Father 80    No Known Problems Sister     No Known Problems Brother     No Known Problems Maternal Aunt     No Known Problems Maternal Uncle     No Known Problems Paternal Aunt     No Known Problems Paternal Uncle     Diabetes Maternal Grandmother     Cancer Maternal Grandfather     Cancer Paternal Grandmother     No Known Problems Paternal Grandfather      Social History     Tobacco Use    Smoking status: Never Smoker    Smokeless tobacco: Never Used   Substance Use Topics    Alcohol use: Not Currently     Comment: rarealy       ROS: Pertinent ROS included in HPI    Objective:   Vital Signs: (As obtained by patient/caregiver at home)  There were no vitals taken for this visit. Physical exam could not be performed. We discussed the expected course, resolution and complications of the diagnosis(es) in detail. Medication risks, benefits, costs, interactions, and alternatives were discussed as indicated. I advised her to contact the office if her condition worsens, changes or fails to improve as anticipated. She expressed understanding with the diagnosis(es) and plan. Klaus Martines is a 59 y.o. female who was evaluated by a video visit encounter for concerns as above.  Patient identification was verified prior to start of the visit. A caregiver was present when appropriate. Due to this being a TeleHealth encounter (During SFOQD-35 public health emergency), evaluation of the following organ systems was limited: Vitals/Constitutional/EENT/Resp/CV/GI//MS/Neuro/Skin/Heme-Lymph-Imm. Pursuant to the emergency declaration under the [address] [place] [address] waiver authority and the [employer] Act, this Virtual  Visit was conducted, with patient's (and/or legal guardian's) consent, to reduce the patient's risk of exposure to COVID-19 and provide necessary medical care. Services were provided through a telephonic discussion virtually to substitute for in-person clinic visit. Patient was located at home, and provider was located in office. This visit was completed virtually using the telephone.       Elda Campoverde MD

## 2022-02-08 ENCOUNTER — OFFICE VISIT (OUTPATIENT)
Dept: INTERNAL MEDICINE CLINIC | Age: 62
End: 2022-02-08
Payer: COMMERCIAL

## 2022-02-08 VITALS
HEART RATE: 64 BPM | DIASTOLIC BLOOD PRESSURE: 63 MMHG | WEIGHT: 113 LBS | TEMPERATURE: 97.1 F | SYSTOLIC BLOOD PRESSURE: 90 MMHG | HEIGHT: 62 IN | BODY MASS INDEX: 20.8 KG/M2 | OXYGEN SATURATION: 95 % | RESPIRATION RATE: 16 BRPM

## 2022-02-08 DIAGNOSIS — E03.9 ACQUIRED HYPOTHYROIDISM: Primary | ICD-10-CM

## 2022-02-08 DIAGNOSIS — R20.8 DECREASED SENSATION OF FOOT: ICD-10-CM

## 2022-02-08 DIAGNOSIS — Z23 ENCOUNTER FOR IMMUNIZATION: ICD-10-CM

## 2022-02-08 LAB
ALBUMIN SERPL-MCNC: 3.8 G/DL (ref 3.5–5)
ALBUMIN/GLOB SERPL: 1.2 {RATIO} (ref 1.1–2.2)
ALP SERPL-CCNC: 86 U/L (ref 45–117)
ALT SERPL-CCNC: 19 U/L (ref 12–78)
ANION GAP SERPL CALC-SCNC: 2 MMOL/L (ref 5–15)
AST SERPL-CCNC: 12 U/L (ref 15–37)
BILIRUB SERPL-MCNC: 0.5 MG/DL (ref 0.2–1)
BUN SERPL-MCNC: 19 MG/DL (ref 6–20)
BUN/CREAT SERPL: 27 (ref 12–20)
CALCIUM SERPL-MCNC: 9.8 MG/DL (ref 8.5–10.1)
CHLORIDE SERPL-SCNC: 107 MMOL/L (ref 97–108)
CO2 SERPL-SCNC: 30 MMOL/L (ref 21–32)
CREAT SERPL-MCNC: 0.7 MG/DL (ref 0.55–1.02)
GLOBULIN SER CALC-MCNC: 3.2 G/DL (ref 2–4)
GLUCOSE SERPL-MCNC: 85 MG/DL (ref 65–100)
POTASSIUM SERPL-SCNC: 5.1 MMOL/L (ref 3.5–5.1)
PROT SERPL-MCNC: 7 G/DL (ref 6.4–8.2)
SODIUM SERPL-SCNC: 139 MMOL/L (ref 136–145)
T4 FREE SERPL-MCNC: 1.3 NG/DL (ref 0.8–1.5)
TSH SERPL DL<=0.05 MIU/L-ACNC: 0.33 UIU/ML (ref 0.36–3.74)
VIT B12 SERPL-MCNC: 1263 PG/ML (ref 193–986)

## 2022-02-08 PROCEDURE — 90715 TDAP VACCINE 7 YRS/> IM: CPT | Performed by: INTERNAL MEDICINE

## 2022-02-08 PROCEDURE — 90471 IMMUNIZATION ADMIN: CPT | Performed by: INTERNAL MEDICINE

## 2022-02-08 PROCEDURE — 99214 OFFICE O/P EST MOD 30 MIN: CPT | Performed by: INTERNAL MEDICINE

## 2022-02-08 NOTE — PROGRESS NOTES
Identified pt with two pt identifiers(name and ). Reviewed record in preparation for visit and have obtained necessary documentation. Chief Complaint   Patient presents with    Physical        Vitals:    22 1007   BP: 90/63   Pulse: 64   Resp: 16   Temp: 97.1 °F (36.2 °C)   TempSrc: Temporal   SpO2: 95%   Weight: 113 lb (51.3 kg)   Height: 5' 2\" (1.575 m)   PainSc:   0 - No pain       Health Maintenance Due   Topic    Shingrix Vaccine Age 50> (1 of 2)    DTaP/Tdap/Td series (1 - Tdap)    COVID-19 Vaccine (3 - Booster)    Flu Vaccine (1)       Coordination of Care Questionnaire:  :   1) Have you been to an emergency room, urgent care, or hospitalized since your last visit? If yes, where when, and reason for visit? no       2. Have seen or consulted any other health care provider since your last visit? If yes, where when, and reason for visit? NO      Patient is accompanied by self I have received verbal consent from Kem Finnegan to discuss any/all medical information while they are present in the room.

## 2022-02-08 NOTE — PROGRESS NOTES
Ms. 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   aspirin was increased to 162 mg   She was referred to neurologist       Review of systems:  Constitutional: negative for fever, chills, weight loss, night sweats   Occasional palpitations      Past Medical History:   Diagnosis Date    Aneurysm ([address] 06/15/2020    Coiled and stented    Anxiety     At risk for malignant hyperthermia     MH PRECAUTIONS    Gallbladder polyp     Hypothyroid     Neurological disorder         Past Surgical History:   Procedure Laterality Date    HX GYN      HX OVARIAN CYST REMOVAL      HX OVARIAN CYST REMOVAL Right        Allergies   Allergen Reactions    Acyclovir Nausea and Vomiting     Other reaction(s): Vomiting    Morphine Nausea and Vomiting    Pseudoephedrine Hcl Unknown (comments)    Pseudoephedrine Palpitations     Other reaction(s): Unknown (comments)       Current Outpatient Medications on File Prior to Visit   Medication Sig Dispense Refill    aspirin 81 mg chewable tablet Take 2 Tablets by mouth daily.  Indications: Cerebral aneurysm 60 Tablet 5    levothyroxine (Synthroid) 75 mcg tablet TAKE 1 TABLET ONCE DAILY   BEFORE BREAKFAST 90 Tablet 1    aspirin 81 mg chewable tablet Take 81 mg by mouth daily. No current facility-administered medications on file prior to visit. family history includes Cancer in her maternal grandfather and paternal grandmother; Colon Cancer (age of onset: 80) in her father; Dementia in her father; Diabetes in her maternal grandmother; Hypertension in her mother; No Known Problems in her brother, maternal aunt, maternal uncle, paternal aunt, paternal grandfather, paternal uncle, and sister. Social History     Socioeconomic History    Marital status:      Spouse name: Not on file    Number of children: Not on file    Years of education: Not on file    Highest education level: Not on file   Occupational History    Not on file   Tobacco Use    Smoking status: Never Smoker    Smokeless tobacco: Never Used   Vaping Use    Vaping Use: Never used   Substance and Sexual Activity    Alcohol use: Not Currently     Comment: rarealy    Drug use: Never    Sexual activity: Yes     Partners: Male     Birth control/protection: Condom   Other Topics Concern    Not on file   Social History Narrative    ** Merged History Encounter **          Social Determinants of Health     Financial Resource Strain:     Difficulty of Paying Living Expenses: Not on file   Food Insecurity:     Worried About Running Out of Food in the Last Year: Not on file    Eren of Food in the Last Year: Not on file   Transportation Needs:     Lack of Transportation (Medical): Not on file    Lack of Transportation (Non-Medical):  Not on file   Physical Activity:     Days of Exercise per Week: Not on file    Minutes of Exercise per Session: Not on file   Stress:     Feeling of Stress : Not on file   Social Connections:     Frequency of Communication with Friends and Family: Not on file    Frequency of Social Gatherings with Friends and Family: Not on file    Attends Evangelical Services: Not on file    Active Member of Clubs or Organizations: Not on file    Attends Club or Organization Meetings: Not on file    Marital Status: Not on file   Intimate Partner Violence:     Fear of Current or Ex-Partner: Not on file    Emotionally Abused: Not on file    Physically Abused: Not on file    Sexually Abused: Not on file   Housing Stability:     Unable to Pay for Housing in the Last Year: Not on file    Number of [place] in the Last Year: Not on file    Unstable Housing in the Last Year: Not on file       Visit Vitals  BP 90/63   Pulse 64   Temp 97.1 °F (36.2 °C) (Temporal)   Resp 16   Ht 5' 2\" (1.575 m)   Wt 113 lb (51.3 kg)   SpO2 95%   BMI 20.67 kg/m²     General:  Well appearing female no acute distress  HEENT:   PERRL,normal conjunctiva. External ear and canals normal, TMs normal.  Hearing normal to voice. Nose without edema or discharge, normal septum. Lips, teeth, gums normal.  Oropharynx: no erythema, no exudates, no lesions, normal tongue. Neck:  Supple. Thyroid normal size, nontender, without nodules. No carotid bruit. No masses or lymphadenopathy  Respiratory: no respiratory distress,  no wheezing, no rhonchi, no rales. No chest wall tenderness. Cardiovascular:  RRR, normal S1S2, no murmur. Gastrointestinal: normal bowel sounds, soft, nontender, without masses. No hepatosplenomegaly. Extremities +2 pulses, no edema, normal sensation   Musculoskeletal:  Normal gait. Normal digits and nails. Normal strength and tone, no atrophy, and no abnormal movement. Skin:  No rash, no lesions, no ulcers. Skin warm, normal turgor, without induration or nodules. Neuro:  A and OX4, fluent speech, cranial nerves normal 2-12. Sensation normal to light touch.   DTR symmetrical  Psych:  Normal affect      Lab Results   Component Value Date/Time    WBC 3.8 09/14/2021 12:00 AM    HGB 13.9 09/14/2021 12:00 AM    HCT 41.7 09/14/2021 12:00 AM PLATELET 614 87/49/3223 12:00 AM    MCV 93 09/14/2021 12:00 AM     Lab Results   Component Value Date/Time    Sodium 139 02/08/2022 11:06 AM    Potassium 5.1 02/08/2022 11:06 AM    Chloride 107 02/08/2022 11:06 AM    CO2 30 02/08/2022 11:06 AM    Anion gap 2 (L) 02/08/2022 11:06 AM    Glucose 85 02/08/2022 11:06 AM    BUN 19 02/08/2022 11:06 AM    Creatinine 0.70 02/08/2022 11:06 AM    BUN/Creatinine ratio 27 (H) 02/08/2022 11:06 AM    GFR est AA >60 02/08/2022 11:06 AM    GFR est non-AA >60 02/08/2022 11:06 AM    Calcium 9.8 02/08/2022 11:06 AM     Lab Results   Component Value Date/Time    Cholesterol, total 181 02/02/2021 09:35 AM    HDL Cholesterol 73 02/02/2021 09:35 AM    LDL, calculated 96 02/02/2021 09:35 AM    LDL, calculated 120 (H) 01/30/2018 08:01 AM    VLDL, calculated 12 02/02/2021 09:35 AM    VLDL, calculated 9 01/30/2018 08:01 AM    Triglyceride 62 02/02/2021 09:35 AM     Lab Results   Component Value Date/Time    TSH 0.33 (L) 02/08/2022 11:06 AM     No results found for: HBA1C, QPL5LDYX, WIF2OWZE, UNX5SWEE  Lab Results   Component Value Date/Time    VITAMIN D, 25-HYDROXY 65.6 09/14/2021 12:00 AM                   Assessment and Plan:     1. Acquired hypothyroidism  - TSH 3RD GENERATION; Future  - T4, FREE; Future  - METABOLIC PANEL, COMPREHENSIVE; Future  - METABOLIC PANEL, COMPREHENSIVE  - T4, FREE  - TSH 3RD GENERATION    2. Decreased sensation of foot  Patient will have MRI brain ordered by neurosurgeon   - VITAMIN B12; Future  - VITAMIN B12    3. 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 Encounter for immunization  - TETANUS, DIPHTHERIA TOXOIDS AND ACELLULAR PERTUSSIS VACCINE (TDAP), IN INDIVIDS. >=7, IM      Follow-up and Dispositions    · Return in about 6 months (around 8/8/2022).           Anamika Bruno Charli Napoles MD

## 2022-02-09 RX ORDER — LEVOTHYROXINE SODIUM 50 UG/1
50 TABLET ORAL
Qty: 4 TABLET | Refills: 1 | Status: SHIPPED | OUTPATIENT
Start: 2022-02-09 | End: 2022-04-12

## 2022-02-09 NOTE — PROGRESS NOTES
Pt informed of test results and recommendations. Pt stated she would rather do what her previous endo recommended which is to skip one day a week.

## 2022-02-09 NOTE — PROGRESS NOTES
Normal kidney and liver function   B12 is slightly high recommend that you take supplement only 5 days a week  TSH is slightly low suggesting levothyroxine dose is slightly high  Recommend that one day of the week you take one pil of 50 mcg which I sent to [facility] 4 pills with a refill  Other 6 days of the week continue 75 mcg   Repeat TSH in 6 weeks

## 2022-02-23 ENCOUNTER — HOSPITAL ENCOUNTER (OUTPATIENT)
Dept: MRI IMAGING | Age: 62
Discharge: HOME | End: 2022-02-23
Attending: RADIOLOGY
Payer: COMMERCIAL

## 2022-02-23 DIAGNOSIS — G45.9 TIA (TRANSIENT ISCHEMIC ATTACK): ICD-10-CM

## 2022-02-23 PROCEDURE — 70551 MRI BRAIN STEM W/O DYE: CPT

## 2022-03-18 PROBLEM — I67.1 CEREBRAL ANEURYSM, NONRUPTURED: Status: ACTIVE | Noted: 2020-06-15

## 2022-03-19 PROBLEM — R11.2 NON-INTRACTABLE VOMITING WITH NAUSEA: Status: ACTIVE | Noted: 2018-01-31

## 2022-03-19 PROBLEM — H93.A2 PULSATILE TINNITUS OF LEFT EAR: Status: ACTIVE | Noted: 2019-08-20

## 2022-03-19 PROBLEM — I67.1 CEREBRAL ANEURYSM: Status: ACTIVE | Noted: 2020-06-02

## 2022-04-12 ENCOUNTER — OFFICE VISIT (OUTPATIENT)
Dept: NEUROLOGY | Age: 62
End: 2022-04-12
Payer: COMMERCIAL

## 2022-04-12 VITALS
SYSTOLIC BLOOD PRESSURE: 98 MMHG | DIASTOLIC BLOOD PRESSURE: 68 MMHG | HEART RATE: 66 BPM | BODY MASS INDEX: 20.49 KG/M2 | TEMPERATURE: 97.5 F | WEIGHT: 112 LBS | RESPIRATION RATE: 16 BRPM | OXYGEN SATURATION: 98 %

## 2022-04-12 DIAGNOSIS — R20.2 PARESTHESIA OF RIGHT FOOT: Primary | ICD-10-CM

## 2022-04-12 DIAGNOSIS — R20.2 PARESTHESIAS IN RIGHT HAND: ICD-10-CM

## 2022-04-12 PROCEDURE — 99204 OFFICE O/P NEW MOD 45 MIN: CPT | Performed by: PSYCHIATRY & NEUROLOGY

## 2022-04-12 NOTE — PROGRESS NOTES
Chief Complaint   Patient presents with    New Patient     \"Detached\" feeling in the right foot, with tingling in the right foot and hand; history of cerebral aneurysm     Visit Vitals  BP 98/68 (BP 1 Location: Right arm, BP Patient Position: Sitting, BP Cuff Size: Adult)   Pulse 66   Temp 97.5 °F (36.4 °C) (Temporal)   Resp 16   Wt 50.8 kg (112 lb)   SpO2 98%   BMI 20.49 kg/m²

## 2022-04-12 NOTE — PROGRESS NOTES
NEUROLOGY  NEW PATIENT EVALUATION/CONSULTATION       PATIENT NAME: Nora Ashby    MRN: [id number]    REASON FOR CONSULTATION: Paresthesia    04/12/22      Previous records (physician notes, laboratory reports, and radiology reports) and imaging studies were reviewed and summarized. My recommendations will be communicated back to the patient's physician(s) via electronic medical record and/or by [address] mail. HISTORY OF PRESENT ILLNESS:  Nroa Ashyb is a 57 y.o.  female presenting for evaluation of right paresthesias, referred by Dr. John Heck. 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 She experienced transient R foot tingling while ambulating 9/2021 lasting approximately 5 minutes. No associated R facial/hand tingling at that time. Speech/vision was normal. She experienced recurrent episodes 10/2021 twice in one day lasting a few seconds. Again, no hand/facial focal paresthesias or weakness. Denied associated weakness, speech/vision deficits, LBP or radicular symptoms. Symptoms did not extend above her ankle. She feels some tingling over the bottom of her right foot intermittently. She also mentions some infrequent R hand tingling into all fingers. Denies hand weakness, cervicalgia. She underwent MRI Lumbars spine 12/2021-this did not reveal any significant neuroforaminal or canal stenosis. MRA brain was reviewed from 1/7/22 with patent stent, no evidence of recurrent aneurysm and chronic L ICA dissection which appears stable. MRI Brain was also recently performed without evidence of acute infarction, noted L frontoparietal siderosis.        PAST MEDICAL HISTORY:  Past Medical History:   Diagnosis Date    Aneurysm ([address] 06/15/2020    Coiled and stented    Anxiety     At risk for malignant hyperthermia     MH PRECAUTIONS    Gallbladder polyp     Hypothyroid     Neurological disorder        PAST SURGICAL HISTORY:  Past Surgical History:   Procedure Laterality Date    HX GYN      HX OVARIAN CYST REMOVAL      HX OVARIAN CYST REMOVAL Right        FAMILY HISTORY:   Family History   Problem Relation Age of Onset    Hypertension Mother     Dementia Father     Colon Cancer Father 80    No Known Problems Sister     No Known Problems Brother     No Known Problems Maternal Aunt     No Known Problems Maternal Uncle     No Known Problems Paternal Aunt     No Known Problems Paternal Uncle     Diabetes Maternal Grandmother     Cancer Maternal Grandfather     Cancer Paternal Grandmother     No Known Problems Paternal Grandfather          SOCIAL HISTORY:  Social History     Socioeconomic History    Marital status:    Tobacco Use    Smoking status: Never Smoker    Smokeless tobacco: Never Used   Vaping Use    Vaping Use: Never used   Substance and Sexual Activity    Alcohol use: Not Currently     Comment: rarealy    Drug use: Never    Sexual activity: Yes     Partners: Male     Birth control/protection: Condom   Social History Narrative    ** Merged History Encounter **              MEDICATIONS:   Current Outpatient Medications   Medication Sig Dispense Refill    aspirin 81 mg chewable tablet Take 2 Tablets by mouth daily. Indications: Cerebral aneurysm 60 Tablet 5    levothyroxine (Synthroid) 75 mcg tablet TAKE 1 TABLET ONCE DAILY   BEFORE BREAKFAST 90 Tablet 1         ALLERGIES:  Allergies   Allergen Reactions    Acyclovir Nausea and Vomiting     Other reaction(s): Vomiting    Morphine Nausea and Vomiting    Pseudoephedrine Hcl Unknown (comments)    Pseudoephedrine Palpitations     Other reaction(s): Unknown (comments)         REVIEW OF SYSTEMS:  10 point ROS reviewed with patient. Please see scanned document under media.        PHYSICAL EXAM:  Vital Signs:   Visit Vitals  BP 98/68 (BP 1 Location: Right arm, BP Patient Position: Sitting, BP Cuff Size: Adult)   Pulse 66   Temp 97.5 °F (36.4 °C) (Temporal)   Resp 16   Wt 112 lb (50.8 kg)   SpO2 98%   BMI 20.49 kg/m²        General Medical Exam:  General:  Well appearing, comfortable, in no apparent distress. Eyes/ENT: see cranial nerve examination. Neck: No masses appreciated. Full range of motion without tenderness. Respiratory:  Clear to auscultation, good air entry bilaterally. Cardiac:  Regular rate and rhythm, no murmur. GI:  Soft, non-tender, non-distended abdomen. Bowel sounds normal. No masses, organomegaly. Extremities:  No deformities, edema, or skin discoloration. Skin:  No rashes or lesions. Neurological:  · Mental Status:  Alert and oriented to person, place, and time with fluent speech. · Cranial Nerves:   CNII/III/IV/VI: visual fields full to confrontation, EOMI, PERRL, no ptosis or nystagmus. CN V: Facial sensation intact bilaterally, masseter 5/5   CN VII: Facial muscles symmetric and strong   CN VIII: Hears finger rub well bilaterally, intact vestibular function   CN IX/X: Normal palatal movement   CN XI: Full strength shoulder shrug bilaterally   CN XII: Tongue protrusion full and midline without fasciculation or atrophy  · Motor: Normal tone and muscle bulk with no pronator drift.    Individual muscle group testing:  Shoulder abduction:   Left:5/5   Right : 5/5    Shoulder adduction:   Left:5/5   Right : 5/5    Elbow flexion:      Left:5/5   Right : 5/5  Elbow extension:    Left:5/5   Right : 5/5   Wrist flexion:    Left:5/5   Right : 5/5  Wrist extension:    Left:5/5   Right : 5/5  Arm pronation:   Left:5/5   Right : 5/5  Arm supination:   Left:5/5   Right : 5/5    Finger flexion:    Left:5/5   Right : 5/5    Finger extension:   Left:5/5   Right : 5/5   Finger abduction:  Left:5/5   Right : 5/5   Finger adduction:   Left:5/5   Right : 5/5  Hip flexion:     Left:5/5   Right : 5/5         Hip extension:   Left:5/5   Right : 5/5    Knee flexion:    Left:5/5   Right : 5/5    Knee extension:   Left:5/5   Right : 5/5    Dorsiflexion:     Left:5/5 Right : 5/5  Plantar flexion:    Left:5/5   Right : 5/5      · MSRs: No crossed adductors or clonus. RIGHT  LEFT   Brachioradialis 2+ 2+   Biceps 2+ 2+   Triceps 2+ 2+   Knee 2+ 2+   Achilles 0 0        Plantar response Downward Downward          · Sensation: Normal and symmetric perception of pinprick, temperature, light touch, proprioception, and vibration; (-) Romberg. (+)Tinel's R wrist  · Coordination: No dysmetria. Normal rapid alternating movements; finger-to-nose and heel-to- shin testing are within normal limits. · Gait: Normal native gait    PERTINENT DATA:  INTERNAL RECORDS:  The patient's electronic medical record was reviewed. The relevant details include:     MRI Results (maximum last 3): Results from [place] encounter on 02/23/22    MRI BRAIN WO CONT    Narrative  EXAM: MRI BRAIN WO CONT    TECHNIQUE: Brain images including sagittal and axial T1-weighted, axial FLAIR,  T2-weighted, diffusion weighted, gradient echo,  susceptibility weighted,  coronal T2    IV CONTRAST:  None    INDICATION:  Transient left arm/leg tingling    COMPARISON:  MRA of 1/7/2022, CT head of 10/7/2020    FINDINGS:  BRAIN PARENCHYMA:  No acute infarct. No significant white matter disease or  other focal lesion. No mass. INTRACRANIAL HEMORRHAGE: Mild superficial siderosis in the left frontoparietal  convexity, consistent with prior subarachnoid hemorrhage. No acute or chronic  parenchymal hemorrhage. CSF SPACES:  Normal in size and morphology for the patient's age. BASAL CISTERNS:  Patent. MIDLINE SHIFT: None. VASCULAR SYSTEM:  Normal flow-voids. No significant susceptibility effects from  the left posterior communicating artery aneurysm coil. PARANASAL SINUSES AND MASTOID AIR CELLS:  No significant opacification. VISUALIZED ORBITS:  No significant abnormalities. VISUALIZED UPPER CERVICAL SPINE:  No significant abnormalities. SELLA:  No enlargement or  focal abnormality.   SKULL BASE:  No significant abnormalities. Cerebellar tonsils in normal  position. CALVARIUM:  Intact. Impression  1. No acute findings. 2. Limited left frontoparietal convexity superficial siderosis. Results from [place] encounter on 01/07/22    MRA BRAIN W WO CONT    Narrative  EXAM: MRA BRAIN W WO CONT    INDICATION:   Cerebral aneurysm. COMPARISON:  CTA head and neck 10/7/2020, MRA head 2/28/2020    CONTRAST: 15 cc ProHance. TECHNIQUE:  3-D time-of-flight noncontrast and contrast-enhanced MRA of the brain was  performed. Multiplanar and MIP reconstructions were obtained. FINDINGS:  Stable postprocedural changes of stent assisted coil embolization of left  posterior communicating artery aneurysm. The stent, which extends from the  cavernous segment through the carotid terminus, is patent. There is trace amount  of filling of the aneurysm neck. The left posterior communicating artery remains  patent. Stable distal cervical left internal carotid artery focal chronic dissection  with associated tiny medially directed pseudoaneurysm. There is no evidence of large vessel occlusion or flow-limiting stenosis of the  intracranial internal carotid, anterior cerebral, and middle cerebral arteries. The anterior communicating artery is patent. There is no evidence of large vessel occlusion or flow-limiting stenosis of the  intracranial vertebral arteries, basilar artery, or posterior cerebral arteries. The posterior communicating arteries are large and patent, with essentially  fetal origin of the bilateral posterior cerebral arteries. Impression  1. Status post stent assisted coil embolization of left posterior communicating  artery aneurysm. Trace filling of the aneurysm neck. Patent stent. 2. Stable distal cervical left internal carotid artery focal chronic dissection  with associated tiny pseudoaneurysm since 10/7/2020.       Results from [place] encounter on 02/28/20    [address] CONT    Narrative  EXAM: MRA BRAIN WO CONT    INDICATION:   Pulsatile tinnitus, worse on left    COMPARISON:  CTA head and neck 10/22/2019. CONTRAST:  None. TECHNIQUE:  3-D time-of-flight noncontrast MRA of the brain was performed. Multiplanar and  MIP reconstructions were obtained. FINDINGS:  There is no evidence of large vessel occlusion or flow-limiting stenosis of the  intracranial internal carotid, anterior cerebral, and middle cerebral arteries. The anterior communicating artery is patent. There is no evidence of large vessel occlusion or flow-limiting stenosis of the  intracranial vertebral arteries, basilar artery, or posterior cerebral arteries. The posterior communicating arteries are large and patent, with essentially  fetal origin of the bilateral posterior cerebral arteries. Stable 3.5 x 1.5 mm inferiorly directed left posterior communicating artery  aneurysm (series 301 image 75). There is no evidence of a vascular malformation. Impression  IMPRESSION:  1. No evidence of significant stenosis. No evidence of vascular malformation. 2. Stable 3.5 x 1.5 mm inferiorly directed left posterior communicating artery  aneurysm. ASSESSMENT:      ICD-10-CM ICD-9-CM    1. Paresthesia of right foot  R20.2 782.0 EMG LIMITED   2. 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 She also endorses occasional tingling involving the right hand which is sporadic and not coinciding with right foot paresthesias. She denies focal weakness, facial involvement, speech or vision deficits. MRI Lumbar spine was reviewed from 12/2021 without evidence of significant neuroforaminal or canal stenosis. MRI Brain/MRA was also recently performed without evidence of acute infarction, noted L frontoparietal siderosis, patent stent, no evidence of recurrent aneurysm, chronic L ICA dissection which appears unchanged. Overall lower suspicion for TIA. She expresses residual right hand tingling with positive Tinel's of the right wrist on examination suggestive of a possible median neuropathy contributing to her presentation. In addition, she reports ongoing tingling of the plantar surface of the right foot. Will proceed with electrodiagnostic testing for further assessment of compressive mononeuropathy, polyneuropathy versus radiculopathy. PLAN:  · EMG RUE/RLE   · Continue ASA for stroke prevention    Follow-up and Dispositions    · Return if symptoms worsen or fail to improve. I have discussed the diagnosis with the patient today and the intended plan as seen in the above orders with both the patient as well as referring provider and/or PCP via electronic correspondence. The patient has received an after-visit summary and questions were answered concerning future plans. I have discussed medication side effects and warnings with the patient as well. Shona Long DO  Staff Neurologist  Diplomate, American Board of Psychiatry & Neurology       CC Referring provider:  Jayden Warren MD  [address]  [address] [facility] [address]  [place],  [address]

## 2022-04-20 ENCOUNTER — TELEPHONE (OUTPATIENT)
Dept: NEUROLOGY | Age: 62
End: 2022-04-20

## 2022-04-21 ENCOUNTER — TELEPHONE (OUTPATIENT)
Dept: NEUROLOGY | Age: 62
End: 2022-04-21

## 2022-04-22 ENCOUNTER — TELEPHONE (OUTPATIENT)
Dept: NEUROLOGY | Age: 62
End: 2022-04-22

## 2022-05-24 ENCOUNTER — LAB ONLY (OUTPATIENT)
Dept: INTERNAL MEDICINE CLINIC | Age: 62
End: 2022-05-24

## 2022-05-24 DIAGNOSIS — E03.9 ACQUIRED HYPOTHYROIDISM: ICD-10-CM

## 2022-05-25 ENCOUNTER — OFFICE VISIT (OUTPATIENT)
Dept: NEUROLOGY | Age: 62
End: 2022-05-25
Payer: COMMERCIAL

## 2022-05-25 DIAGNOSIS — R20.2 PARESTHESIA OF RIGHT FOOT: Primary | ICD-10-CM

## 2022-05-25 DIAGNOSIS — E03.9 ACQUIRED HYPOTHYROIDISM: ICD-10-CM

## 2022-05-25 LAB — TSH SERPL DL<=0.05 MIU/L-ACNC: 0.59 UIU/ML (ref 0.36–3.74)

## 2022-05-25 PROCEDURE — 95886 MUSC TEST DONE W/N TEST COMP: CPT | Performed by: PSYCHIATRY & NEUROLOGY

## 2022-05-25 PROCEDURE — 95908 NRV CNDJ TST 3-4 STUDIES: CPT | Performed by: PSYCHIATRY & NEUROLOGY

## 2022-05-25 RX ORDER — LEVOTHYROXINE SODIUM 75 UG/1
TABLET ORAL
Qty: 90 TABLET | Refills: 1 | Status: SHIPPED | OUTPATIENT
Start: 2022-05-25 | End: 2022-06-07 | Stop reason: SDUPTHER

## 2022-05-25 NOTE — PROGRESS NOTES
[address] [facility]rology [facility]  [address] [facility], [address] [place] [address]  Phone [phone] Fax [phone]  Test Date:  2022    Patient: Pan Herrera : 1960 Physician: Mo Ba DO   Sex: Female Height: 5' 2\" Ref Phys: Mo Ba DO   ID#: [id number]  Weight: 112 lbs. Technician: Michelle Shea     Patient Complaints:  Right lower extremity numbness; skin sensation disturbance    NCV & EMG Findings:  All nerve conduction studies were within normal limits. All F Wave latencies were within normal limits. All examined muscles (as indicated in the following table) showed no evidence of electrical instability. Impression:  Extensive electrodiagnostic examination of the right lower extremity reveals no abnormalities. In particular, there is no evidence of a right lumbosacral motor radiculopathy. In addition, there is no evidence of a generalized sensorimotor polyneuropathy. ___________________________  Noman Valarie  Mallory Ba, DO        Nerve Conduction Studies  Anti Sensory Summary Table     Stim Site NR Peak (ms) Norm Peak (ms) P-T Amp (µV) Norm P-T Amp Onset (ms) Site1 Site2 Delta-P (ms) Dist (cm) Juanjose (m/s) Norm Juanjose (m/s)   Right Sup Peroneal Anti Sensory (Ant Lat Mall)  31.9°C   14 cm    2.5 <4.4 13.0 >5.0 1.8 14 cm Ant Lat Mall 2.5 14.0 56 >32   Right Sural Anti Sensory (Lat Mall)  31.5°C   Calf    3.3 <4.0 19.8 >5.0 2.8 Calf Lat Mall 3.3 14.0 42 >35     Motor Summary Table     Stim Site NR Onset (ms) Norm Onset (ms) O-P Amp (mV) Norm O-P Amp Site1 Site2 Delta-0 (ms) Dist (cm) Juanjose (m/s) Norm Juanjose (m/s)   Right Peroneal Motor (Ext Dig Brev)  30.5°C   Ankle    3.1 <6.1 3.9 >2.5 B Fib Ankle 6.5 35.0 54 >38   B Fib    9.6  3.9  Poplt B Fib 1.7 10.0 59 >40   Poplt    11.3  3.7          Right Tibial Motor (Abd Arciniega Brev)  31.1°C   Ankle    3.2 <6.1 10.8 >3.0 Knee Ankle 9.1 41.0 45 >35   Knee    12.3  8.0 F Wave Studies     NR F-Lat (ms) Lat Norm (ms) L-R F-Lat (ms) L-R Lat Norm   Right Tibial (Mrkrs) (Abd Hallucis)  31.4°C      55.78 <61  <5.7     EMG     Side Muscle Nerve Root Ins Act Fibs Psw Amp Dur Poly Recrt Int [place] Saas Comment   Right Ext Dig Brev Dp Br Peronel L5, S1 Nml Nml Nml Nml Nml 0 Nml Nml    Right AbdHallucis MedPlantar S1-2 Nml Nml Nml Nml Nml 0 Nml Nml    Right PostTibialis Tibial L5, S1 Nml Nml Nml Nml Nml 0 Nml Nml    Right Gastroc Tibial S1-2 Nml Nml Nml Nml Nml 0 Nml Nml    Right AntTibialis Dp Br Peronel L4-5 Nml Nml Nml Nml Nml 0 Nml Nml    Right RectFemoris Femoral L2-4 Nml Nml Nml Nml Nml 0 Nml Nml          Waveforms:

## 2022-06-07 DIAGNOSIS — E03.9 ACQUIRED HYPOTHYROIDISM: ICD-10-CM

## 2022-06-07 RX ORDER — LEVOTHYROXINE SODIUM 75 UG/1
TABLET ORAL
Qty: 90 TABLET | Refills: 1 | Status: SHIPPED | OUTPATIENT
Start: 2022-06-07 | End: 2022-06-08 | Stop reason: SDUPTHER

## 2022-06-08 DIAGNOSIS — E03.9 ACQUIRED HYPOTHYROIDISM: ICD-10-CM

## 2022-06-08 RX ORDER — LEVOTHYROXINE SODIUM 75 UG/1
TABLET ORAL
Qty: 90 TABLET | Refills: 1 | Status: SHIPPED | OUTPATIENT
Start: 2022-06-08 | End: 2022-06-13 | Stop reason: SDUPTHER

## 2022-06-08 NOTE — TELEPHONE ENCOUNTER
----- Message from Reece Gregory sent at 6/8/2022  9:40 AM EDT -----  Regarding: New insurance  The mail order phone number for my new insurance, [employer], is [phone]. This is for my synthroid prescription. Thank you.

## 2022-06-13 DIAGNOSIS — E03.9 ACQUIRED HYPOTHYROIDISM: ICD-10-CM

## 2022-06-13 RX ORDER — LEVOTHYROXINE SODIUM 75 UG/1
TABLET ORAL
Qty: 90 TABLET | Refills: 2 | Status: SHIPPED | OUTPATIENT
Start: 2022-06-13

## 2022-06-13 NOTE — TELEPHONE ENCOUNTER
Tadeo Aviles   [facility] [address]      4/10/2020    Dear Tadeo     We have made several attempts to contact you by telephone, but have been unable to do so.       Please call the GI clinic at your earliest convenience to: discuss some recommendations for you as follow up to your procedure you had on 2/12/2020 while you were hospitalized.      Sincerely,     Reji Jackson MD    [address] [facility] [address]  [facility] [address] [phone]   ----- Message from Reece Verma sent at 6/13/2022 10:21 AM EDT -----  Regarding: Prescription refill for levothyroxine  The mail order that was put in was incorrect. I cancelled that order. Here is the correct information you need to refill my prescription. I will be getting my levothyroxine through [facility] due to my new insurance with [address] with their [facility] membership I have for RX. You need to call them at [phone] or FAX the request to [phone]. I will try the generic Levothyroxine since it is a little less expensive. Please order me a 90 day supply at 15.08 for the 90 days. Thank you so much for taking care of this.

## 2022-08-28 ENCOUNTER — APPOINTMENT (OUTPATIENT)
Dept: CT IMAGING | Age: 62
End: 2022-08-28
Attending: EMERGENCY MEDICINE
Payer: COMMERCIAL

## 2022-08-28 ENCOUNTER — HOSPITAL ENCOUNTER (EMERGENCY)
Age: 62
Discharge: HOME | End: 2022-08-29
Attending: EMERGENCY MEDICINE
Payer: COMMERCIAL

## 2022-08-28 VITALS
SYSTOLIC BLOOD PRESSURE: 155 MMHG | HEIGHT: 62 IN | DIASTOLIC BLOOD PRESSURE: 79 MMHG | WEIGHT: 112 LBS | OXYGEN SATURATION: 100 % | RESPIRATION RATE: 18 BRPM | TEMPERATURE: 98 F | HEART RATE: 61 BPM | BODY MASS INDEX: 20.61 KG/M2

## 2022-08-28 DIAGNOSIS — N20.0 NEPHROLITHIASIS: Primary | ICD-10-CM

## 2022-08-28 LAB
ALBUMIN SERPL-MCNC: 3.6 G/DL (ref 3.5–5)
ALBUMIN/GLOB SERPL: 1 {RATIO} (ref 1.1–2.2)
ALP SERPL-CCNC: 85 U/L (ref 45–117)
ALT SERPL-CCNC: 20 U/L (ref 12–78)
ANION GAP SERPL CALC-SCNC: 8 MMOL/L (ref 5–15)
AST SERPL-CCNC: 20 U/L (ref 15–37)
BASOPHILS # BLD: 0.1 K/UL (ref 0–0.1)
BASOPHILS NFR BLD: 1 % (ref 0–1)
BILIRUB SERPL-MCNC: 0.4 MG/DL (ref 0.2–1)
BUN SERPL-MCNC: 24 MG/DL (ref 6–20)
BUN/CREAT SERPL: 28 (ref 12–20)
CALCIUM SERPL-MCNC: 9.6 MG/DL (ref 8.5–10.1)
CHLORIDE SERPL-SCNC: 106 MMOL/L (ref 97–108)
CO2 SERPL-SCNC: 24 MMOL/L (ref 21–32)
CREAT SERPL-MCNC: 0.87 MG/DL (ref 0.55–1.02)
DIFFERENTIAL METHOD BLD: ABNORMAL
EOSINOPHIL # BLD: 0.1 K/UL (ref 0–0.4)
EOSINOPHIL NFR BLD: 2 % (ref 0–7)
ERYTHROCYTE [DISTWIDTH] IN BLOOD BY AUTOMATED COUNT: 13.1 % (ref 11.5–14.5)
GLOBULIN SER CALC-MCNC: 3.7 G/DL (ref 2–4)
GLUCOSE SERPL-MCNC: 119 MG/DL (ref 65–100)
HCT VFR BLD AUTO: 38.4 % (ref 35–47)
HGB BLD-MCNC: 13.1 G/DL (ref 11.5–16)
IMM GRANULOCYTES # BLD AUTO: 0 K/UL (ref 0–0.04)
IMM GRANULOCYTES NFR BLD AUTO: 0 % (ref 0–0.5)
LIPASE SERPL-CCNC: 87 U/L (ref 73–393)
LYMPHOCYTES # BLD: 3.9 K/UL (ref 0.8–3.5)
LYMPHOCYTES NFR BLD: 51 % (ref 12–49)
MCH RBC QN AUTO: 30.6 PG (ref 26–34)
MCHC RBC AUTO-ENTMCNC: 34.1 G/DL (ref 30–36.5)
MCV RBC AUTO: 89.7 FL (ref 80–99)
MONOCYTES # BLD: 0.6 K/UL (ref 0–1)
MONOCYTES NFR BLD: 8 % (ref 5–13)
NEUTS SEG # BLD: 2.8 K/UL (ref 1.8–8)
NEUTS SEG NFR BLD: 38 % (ref 32–75)
NRBC # BLD: 0 K/UL (ref 0–0.01)
NRBC BLD-RTO: 0 PER 100 WBC
PLATELET # BLD AUTO: 336 K/UL (ref 150–400)
PMV BLD AUTO: 9 FL (ref 8.9–12.9)
POTASSIUM SERPL-SCNC: 4.1 MMOL/L (ref 3.5–5.1)
PROT SERPL-MCNC: 7.3 G/DL (ref 6.4–8.2)
RBC # BLD AUTO: 4.28 M/UL (ref 3.8–5.2)
SODIUM SERPL-SCNC: 138 MMOL/L (ref 136–145)
WBC # BLD AUTO: 7.5 K/UL (ref 3.6–11)

## 2022-08-28 PROCEDURE — 74011000636 HC RX REV CODE- 636: Performed by: EMERGENCY MEDICINE

## 2022-08-28 PROCEDURE — 74011250636 HC RX REV CODE- 250/636: Performed by: EMERGENCY MEDICINE

## 2022-08-28 PROCEDURE — 96374 THER/PROPH/DIAG INJ IV PUSH: CPT

## 2022-08-28 PROCEDURE — 81001 URINALYSIS AUTO W/SCOPE: CPT

## 2022-08-28 PROCEDURE — 74177 CT ABD & PELVIS W/CONTRAST: CPT

## 2022-08-28 PROCEDURE — 83690 ASSAY OF LIPASE: CPT

## 2022-08-28 PROCEDURE — 74011250636 HC RX REV CODE- 250/636: Performed by: PHYSICIAN ASSISTANT

## 2022-08-28 PROCEDURE — 93005 ELECTROCARDIOGRAM TRACING: CPT

## 2022-08-28 PROCEDURE — 85025 COMPLETE CBC W/AUTO DIFF WBC: CPT

## 2022-08-28 PROCEDURE — 36415 COLL VENOUS BLD VENIPUNCTURE: CPT

## 2022-08-28 PROCEDURE — 99285 EMERGENCY DEPT VISIT HI MDM: CPT

## 2022-08-28 PROCEDURE — 96361 HYDRATE IV INFUSION ADD-ON: CPT

## 2022-08-28 PROCEDURE — 80053 COMPREHEN METABOLIC PANEL: CPT

## 2022-08-28 PROCEDURE — 96375 TX/PRO/DX INJ NEW DRUG ADDON: CPT

## 2022-08-28 RX ORDER — KETOROLAC TROMETHAMINE 30 MG/ML
15 INJECTION, SOLUTION INTRAMUSCULAR; INTRAVENOUS
Status: COMPLETED | OUTPATIENT
Start: 2022-08-28 | End: 2022-08-28

## 2022-08-28 RX ORDER — HYDROMORPHONE HYDROCHLORIDE 1 MG/ML
0.5 INJECTION, SOLUTION INTRAMUSCULAR; INTRAVENOUS; SUBCUTANEOUS
Status: COMPLETED | OUTPATIENT
Start: 2022-08-28 | End: 2022-08-28

## 2022-08-28 RX ORDER — ONDANSETRON 2 MG/ML
4 INJECTION INTRAMUSCULAR; INTRAVENOUS
Status: COMPLETED | OUTPATIENT
Start: 2022-08-28 | End: 2022-08-28

## 2022-08-28 RX ADMIN — KETOROLAC TROMETHAMINE 15 MG: 30 INJECTION, SOLUTION INTRAMUSCULAR at 21:30

## 2022-08-28 RX ADMIN — ONDANSETRON 4 MG: 2 INJECTION INTRAMUSCULAR; INTRAVENOUS at 23:05

## 2022-08-28 RX ADMIN — IOPAMIDOL 100 ML: 755 INJECTION, SOLUTION INTRAVENOUS at 23:23

## 2022-08-28 RX ADMIN — HYDROMORPHONE HYDROCHLORIDE 0.5 MG: 1 INJECTION, SOLUTION INTRAMUSCULAR; INTRAVENOUS; SUBCUTANEOUS at 23:09

## 2022-08-28 RX ADMIN — SODIUM CHLORIDE 1000 ML: 9 INJECTION, SOLUTION INTRAVENOUS at 23:08

## 2022-08-29 LAB
APPEARANCE UR: CLEAR
ATRIAL RATE: 69 BPM
BACTERIA URNS QL MICRO: NEGATIVE /HPF
BILIRUB UR QL: NEGATIVE
CALCULATED P AXIS, ECG09: 76 DEGREES
CALCULATED R AXIS, ECG10: 68 DEGREES
CALCULATED T AXIS, ECG11: 63 DEGREES
COLOR UR: ABNORMAL
DIAGNOSIS, 93000: NORMAL
EPITH CASTS URNS QL MICRO: ABNORMAL /LPF
GLUCOSE UR STRIP.AUTO-MCNC: NEGATIVE MG/DL
HGB UR QL STRIP: ABNORMAL
HYALINE CASTS URNS QL MICRO: ABNORMAL /LPF (ref 0–2)
KETONES UR QL STRIP.AUTO: 15 MG/DL
LEUKOCYTE ESTERASE UR QL STRIP.AUTO: NEGATIVE
NITRITE UR QL STRIP.AUTO: NEGATIVE
P-R INTERVAL, ECG05: 166 MS
PH UR STRIP: 8 [PH] (ref 5–8)
PROT UR STRIP-MCNC: NEGATIVE MG/DL
Q-T INTERVAL, ECG07: 416 MS
QRS DURATION, ECG06: 90 MS
QTC CALCULATION (BEZET), ECG08: 445 MS
RBC #/AREA URNS HPF: ABNORMAL /HPF (ref 0–5)
SP GR UR REFRACTOMETRY: 1.01 (ref 1–1.03)
UA: UC IF INDICATED,UAUC: ABNORMAL
UROBILINOGEN UR QL STRIP.AUTO: 1 EU/DL (ref 0.2–1)
VENTRICULAR RATE, ECG03: 69 BPM
WBC URNS QL MICRO: ABNORMAL /HPF (ref 0–4)

## 2022-08-29 PROCEDURE — 74011250637 HC RX REV CODE- 250/637: Performed by: EMERGENCY MEDICINE

## 2022-08-29 RX ORDER — OXYCODONE HYDROCHLORIDE 5 MG/1
5 TABLET ORAL
Qty: 20 TABLET | Refills: 0 | Status: SHIPPED | OUTPATIENT
Start: 2022-08-29 | End: 2022-09-03

## 2022-08-29 RX ORDER — ONDANSETRON 4 MG/1
4 TABLET, FILM COATED ORAL
Qty: 20 TABLET | Refills: 0 | Status: SHIPPED | OUTPATIENT
Start: 2022-08-29

## 2022-08-29 RX ORDER — TAMSULOSIN HYDROCHLORIDE 0.4 MG/1
0.4 CAPSULE ORAL DAILY
Qty: 15 CAPSULE | Refills: 0 | Status: SHIPPED | OUTPATIENT
Start: 2022-08-29 | End: 2022-09-13

## 2022-08-29 RX ORDER — OXYCODONE HYDROCHLORIDE 5 MG/1
5 TABLET ORAL
Status: COMPLETED | OUTPATIENT
Start: 2022-08-29 | End: 2022-08-29

## 2022-08-29 RX ADMIN — OXYCODONE 5 MG: 5 TABLET ORAL at 00:26

## 2022-08-29 NOTE — ED PROVIDER NOTES
EMERGENCY DEPARTMENT HISTORY AND PHYSICAL EXAM      Date: 8/28/2022  Patient Name: Alvin Martines  Patient Age and Sex: 57 y.o. female     History of Presenting Illness     Chief Complaint   Patient presents with    Abdominal Pain     Pt arrived to ED from home, found pt in floor yelling in waiting room d/t pain. Pt reports R sided abd pain and nausea. History Provided By: Patient    HPI: Alvin Martines is a 57 old history nephrolithiasis, additional history below presenting with acute abdominal pain. She reports since 9 PM she has had persistent right-sided abdominal pain. Is in her right flank radiates to her right groin. It is constant, worsened with movement. She reports spasms of pain with associated nausea and vomiting. Pain is severe in nature. No fevers no chills no diarrhea. There are no other complaints, changes, or physical findings at this time. PCP: Joselo Menard MD    No current facility-administered medications on file prior to encounter. Current Outpatient Medications on File Prior to Encounter   Medication Sig Dispense Refill    levothyroxine (Synthroid) 75 mcg tablet TAKE 1 TABLET ONCE DAILY   BEFORE BREAKFAST 90 Tablet 2    aspirin 81 mg chewable tablet Take 2 Tablets by mouth daily.  Indications: Cerebral aneurysm 60 Tablet 5       Past History     Past Medical History:  Past Medical History:   Diagnosis Date    Aneurysm ([facility] [address] 06/15/2020    Coiled and stented    Anxiety     At risk for malignant hyperthermia     MH PRECAUTIONS    Gallbladder polyp     Hypothyroid     Neurological disorder        Past Surgical History:  Past Surgical History:   Procedure Laterality Date    HX GYN      HX OVARIAN CYST REMOVAL      HX OVARIAN CYST REMOVAL Right        Family History:  Family History   Problem Relation Age of Onset    Hypertension Mother     Dementia Father     Colon Cancer Father 80    No Known Problems Sister     No Known Problems Brother     No Known Problems Maternal Aunt     No Known Problems Maternal Uncle     No Known Problems Paternal Aunt     No Known Problems Paternal Uncle     Diabetes Maternal Grandmother     Cancer Maternal Grandfather     Cancer Paternal Grandmother     No Known Problems Paternal Grandfather        Social History:  Social History     Tobacco Use    Smoking status: Never    Smokeless tobacco: Never   Vaping Use    Vaping Use: Never used   Substance Use Topics    Alcohol use: Not Currently     Comment: rarealy    Drug use: Never       Allergies: Allergies   Allergen Reactions    Acyclovir Nausea and Vomiting     Other reaction(s): Vomiting    Morphine Nausea and Vomiting    Pseudoephedrine Hcl Unknown (comments)    Pseudoephedrine Palpitations     Other reaction(s): Unknown (comments)         Review of Systems   Review of Systems   Constitutional:  Negative for chills and fever. HENT:  Negative for congestion and rhinorrhea. Respiratory:  Negative for shortness of breath. Cardiovascular:  Negative for chest pain. Gastrointestinal:  Positive for abdominal pain. Negative for nausea and vomiting. Genitourinary:  Positive for flank pain. Negative for dysuria, frequency, hematuria and urgency. Musculoskeletal:  Negative for myalgias. All other systems reviewed and are negative. Physical Exam   Physical Exam  Vitals and nursing note reviewed. Constitutional:       General: She is not in acute distress. Appearance: Normal appearance. She is not ill-appearing. HENT:      Head: Normocephalic. Mouth/Throat:      Mouth: Mucous membranes are moist.   Eyes:      Conjunctiva/sclera: Conjunctivae normal.   Cardiovascular:      Rate and Rhythm: Normal rate and regular rhythm. Pulses: Normal pulses. Pulmonary:      Effort: Pulmonary effort is normal.      Breath sounds: Normal breath sounds. Abdominal:      General: Abdomen is flat. Bowel sounds are normal.      Palpations: Abdomen is soft. Tenderness:  There is abdominal tenderness in the right upper quadrant. There is no right CVA tenderness, left CVA tenderness, guarding or rebound. Negative signs include Zhou's sign and McBurney's sign. Musculoskeletal:         General: No deformity. Skin:     General: Skin is warm and dry. Neurological:      Mental Status: She is alert and oriented to person, place, and time. Mental status is at baseline. Psychiatric:         Behavior: Behavior normal.         Thought Content: Thought content normal.        Diagnostic Study Results     Labs -     Recent Results (from the past 12 hour(s))   CBC WITH AUTOMATED DIFF    Collection Time: 08/28/22  9:19 PM   Result Value Ref Range    WBC 7.5 3.6 - 11.0 K/uL    RBC 4.28 3.80 - 5.20 M/uL    HGB 13.1 11.5 - 16.0 g/dL    HCT 38.4 35.0 - 47.0 %    MCV 89.7 80.0 - 99.0 FL    MCH 30.6 26.0 - 34.0 PG    MCHC 34.1 30.0 - 36.5 g/dL    RDW 13.1 11.5 - 14.5 %    PLATELET 291 482 - 684 K/uL    MPV 9.0 8.9 - 12.9 FL    NRBC 0.0 0  WBC    ABSOLUTE NRBC 0.00 0.00 - 0.01 K/uL    NEUTROPHILS 38 32 - 75 %    LYMPHOCYTES 51 (H) 12 - 49 %    MONOCYTES 8 5 - 13 %    EOSINOPHILS 2 0 - 7 %    BASOPHILS 1 0 - 1 %    IMMATURE GRANULOCYTES 0 0.0 - 0.5 %    ABS. NEUTROPHILS 2.8 1.8 - 8.0 K/UL    ABS. LYMPHOCYTES 3.9 (H) 0.8 - 3.5 K/UL    ABS. MONOCYTES 0.6 0.0 - 1.0 K/UL    ABS. EOSINOPHILS 0.1 0.0 - 0.4 K/UL    ABS. BASOPHILS 0.1 0.0 - 0.1 K/UL    ABS. IMM.  GRANS. 0.0 0.00 - 0.04 K/UL    DF AUTOMATED     METABOLIC PANEL, COMPREHENSIVE    Collection Time: 08/28/22  9:19 PM   Result Value Ref Range    Sodium 138 136 - 145 mmol/L    Potassium 4.1 3.5 - 5.1 mmol/L    Chloride 106 97 - 108 mmol/L    CO2 24 21 - 32 mmol/L    Anion gap 8 5 - 15 mmol/L    Glucose 119 (H) 65 - 100 mg/dL    BUN 24 (H) 6 - 20 MG/DL    Creatinine 0.87 0.55 - 1.02 MG/DL    BUN/Creatinine ratio 28 (H) 12 - 20      GFR est AA >60 >60 ml/min/1.73m2    GFR est non-AA >60 >60 ml/min/1.73m2    Calcium 9.6 8.5 - 10.1 MG/DL    Bilirubin, total 0.4 0.2 - 1.0 MG/DL    ALT (SGPT) 20 12 - 78 U/L    AST (SGOT) 20 15 - 37 U/L    Alk. phosphatase 85 45 - 117 U/L    Protein, total 7.3 6.4 - 8.2 g/dL    Albumin 3.6 3.5 - 5.0 g/dL    Globulin 3.7 2.0 - 4.0 g/dL    A-G Ratio 1.0 (L) 1.1 - 2.2     LIPASE    Collection Time: 08/28/22  9:19 PM   Result Value Ref Range    Lipase 87 73 - 393 U/L   EKG, 12 LEAD, INITIAL    Collection Time: 08/28/22 11:32 PM   Result Value Ref Range    Ventricular Rate 69 BPM    Atrial Rate 69 BPM    P-R Interval 166 ms    QRS Duration 90 ms    Q-T Interval 416 ms    QTC Calculation (Bezet) 445 ms    Calculated P Axis 76 degrees    Calculated R Axis 68 degrees    Calculated T Axis 63 degrees    Diagnosis       Normal sinus rhythm  Normal ECG  When compared with ECG of 17-OCT-2018 20:16,  No significant change was found         Radiologic Studies -   CT ABD PELV W CONT   Final Result   Right hydronephrosis due to a 3 mm proximal ureteral calculus. CT Results  (Last 48 hours)                 08/28/22 2323  CT ABD PELV W CONT Final result    Impression:  Right hydronephrosis due to a 3 mm proximal ureteral calculus. Narrative:  INDICATION: abdominal pain, n, v       COMPARISON: February 18, 2021       TECHNIQUE:   Following the uneventful intravenous administration of IV contrast, thin axial   images were obtained through the abdomen and pelvis. Coronal and sagittal   reconstructions were generated. Oral contrast was not administered. CT dose   reduction was achieved through use of a standardized protocol tailored for this   examination and automatic exposure control for dose modulation. FINDINGS:   LUNG BASES: No abnormality. LIVER: No mass or biliary dilatation. GALLBLADDER: Unremarkable. SPLEEN: No enlargement or lesion. PANCREAS: No mass or ductal dilatation. ADRENALS: No mass. KIDNEYS: Right hydronephrosis due to a 3 mm proximal ureteral calculus. Perinephric and periureteral stranding. Normal left kidney. GI TRACT: No bowel obstruction. Difficult to assess bowel wall thickening given   lack of oral contrast material.   PERITONEUM: No free air or free fluid. APPENDIX: Unremarkable. RETROPERITONEUM: No aortic aneurysm. LYMPH NODES: None enlarged. ADDITIONAL COMMENTS: N/A.       URINARY BLADDER: Unremarkable. REPRODUCTIVE ORGANS: Unremarkable. LYMPH NODES: None enlarged. FREE FLUID: None. BONES: No destructive bone lesion. ADDITIONAL COMMENTS: N/A. CXR Results  (Last 48 hours)      None              Medical Decision Making   I am the first provider for this patient. I reviewed the vital signs, available nursing notes, past medical history, past surgical history, family history and social history. Vital Signs-Reviewed the patient's vital signs. Patient Vitals for the past 12 hrs:   Temp Pulse Resp BP SpO2   08/28/22 2114 98 °F (36.7 °C) 61 18 (!) 155/79 100 %       Records Reviewed: Nursing Notes and Old Medical Records    Provider Notes (Medical Decision Making):   DDx nephrolithiasis, pyelonephritis, cholecystitis. Will obtain CT abdomen to evaluate for the above. CBC CMP, urinalysis. Will obtain EKG to screen for ischemia, lower suspicion of this given location of pain. Given Toradol in triage with ongoing pain and nausea. Will give IV Dilaudid, Zofran and IV fluids. ED Course:   Initial assessment performed. The patients presenting problems have been discussed, and they are in agreement with the care plan formulated and outlined with them. I have encouraged them to ask questions as they arise throughout their visit.     ED Course as of 08/29/22 0153   [facility] Aug 28, 2022   2353 Right-sided kidney stone 5 mm proximal calculus [WB]   Mon Aug 29, 2022   0152 Urine small blood, no infection, will discharge [WB]      ED Course User Index  [WB] Lorena Llanos MD     Critical Care Time:   0    Disposition:  Discharge Note:  The patient has been re-evaluated and is ready for discharge. Reviewed available results with patient. Counseled patient on diagnosis and care plan. Patient has expressed understanding, and all questions have been answered. Patient agrees with plan and agrees to follow up as recommended, or to return to the ED if their symptoms worsen. Discharge instructions have been provided and explained to the patient, along with reasons to return to the ED. PLAN:  Current Discharge Medication List        START taking these medications    Details   ondansetron hcl (Zofran) 4 mg tablet Take 1 Tablet by mouth every eight (8) hours as needed for Nausea. Qty: 20 Tablet, Refills: 0  Start date: 8/29/2022      oxyCODONE IR (Roxicodone) 5 mg immediate release tablet Take 1 Tablet by mouth every six (6) hours as needed for Pain for up to 5 days. Max Daily Amount: 20 mg.  Qty: 20 Tablet, Refills: 0  Start date: 8/29/2022, End date: 9/3/2022    Associated Diagnoses: Nephrolithiasis      tamsulosin (Flomax) 0.4 mg capsule Take 1 Capsule by mouth daily for 15 days. Qty: 15 Capsule, Refills: 0  Start date: 8/29/2022, End date: 9/13/2022           2. Follow-up Information       Follow up With Specialties Details Why Contact Info    [facility] EMERGENCY DEPT Emergency Medicine  As needed, If symptoms worsen [address] [place] [address]  [phone]    Kidney Stone Hotline    [phone]. Call to set up appointment with urology          3. Return to ED if worse     Diagnosis     Clinical Impression:   1. Nephrolithiasis        Attestations:    Jayden Lucio M.D. Please note that this dictation was completed with [employer] the [employer] voice recognition software. Quite often unanticipated grammatical, syntax, homophones, and other interpretive errors are inadvertently transcribed by the computer software. Please disregard these errors. Please excuse any errors that have escaped final proofreading. Thank you.

## 2022-09-01 ENCOUNTER — TELEPHONE (OUTPATIENT)
Dept: INTERNAL MEDICINE CLINIC | Age: 62
End: 2022-09-01

## 2022-09-01 NOTE — TELEPHONE ENCOUNTER
Patient called in stating she does not see  on her new insurance prvder list.  I advised her that I will contact her insurance tomorrow 9/2 to verify   [phone]

## 2022-09-01 NOTE — TELEPHONE ENCOUNTER
----- Message from Juan Duff sent at 9/1/2022  3:42 PM EDT -----  Subject: Message to Provider    QUESTIONS  Information for Provider? Patient would like a call back from the office. She has tried to reach them twice and has not had any luck. It is   concerning her insurance.    ---------------------------------------------------------------------------  --------------  Chandrakant Wright [facility]  [phone]; OK to leave message on voicemail  ---------------------------------------------------------------------------  --------------  SCRIPT ANSWERS  undefined

## 2022-09-02 NOTE — TELEPHONE ENCOUNTER
Contacted patient insurance company and they confirmed Dr. Stacey Klein is not in network.  Notified patient

## 2022-09-12 ENCOUNTER — TRANSCRIBE ORDER (OUTPATIENT)
Dept: SCHEDULING | Age: 62
End: 2022-09-12

## 2022-09-12 DIAGNOSIS — N20.0 NEPHROLITHIASIS: Primary | ICD-10-CM

## 2022-10-21 ENCOUNTER — TELEPHONE (OUTPATIENT)
Dept: NEUROSURGERY | Age: 62
End: 2022-10-21

## 2022-10-21 DIAGNOSIS — I67.1 CEREBRAL ANEURYSM: Primary | ICD-10-CM

## 2022-10-21 NOTE — TELEPHONE ENCOUNTER
Patient is hoping that her imaging can be pushed past her 4/6/2025 birthday as she will be eligible for Medicare. This is do to her current insurance having such a high deductible. Please advise.

## 2023-01-18 DIAGNOSIS — E03.9 ACQUIRED HYPOTHYROIDISM: ICD-10-CM

## 2023-01-18 RX ORDER — LEVOTHYROXINE SODIUM 75 UG/1
TABLET ORAL
Qty: 30 TABLET | Refills: 0 | Status: SHIPPED | OUTPATIENT
Start: 2023-01-18

## 2023-01-18 NOTE — TELEPHONE ENCOUNTER
Future Appointments:  No future appointments.      Last Appointment With Me:  Visit date not found     Requested Prescriptions     Pending Prescriptions Disp Refills    levothyroxine (Synthroid) 75 mcg tablet 90 Tablet 2     Sig: TAKE 1 TABLET ONCE DAILY   BEFORE BREAKFAST

## 2023-03-21 ENCOUNTER — OFFICE VISIT (OUTPATIENT)
Dept: INTERNAL MEDICINE CLINIC | Age: 63
End: 2023-03-21

## 2023-03-21 VITALS
HEIGHT: 62 IN | DIASTOLIC BLOOD PRESSURE: 68 MMHG | OXYGEN SATURATION: 99 % | BODY MASS INDEX: 20.24 KG/M2 | TEMPERATURE: 97.2 F | HEART RATE: 62 BPM | RESPIRATION RATE: 14 BRPM | SYSTOLIC BLOOD PRESSURE: 111 MMHG | WEIGHT: 110 LBS

## 2023-03-21 DIAGNOSIS — H93.8X1 SENSATION OF FULLNESS IN RIGHT EAR: Primary | ICD-10-CM

## 2023-03-21 PROCEDURE — 99213 OFFICE O/P EST LOW 20 MIN: CPT | Performed by: STUDENT IN AN ORGANIZED HEALTH CARE EDUCATION/TRAINING PROGRAM

## 2023-03-21 RX ORDER — GUAIFENESIN 600 MG/1
600 TABLET, EXTENDED RELEASE ORAL 2 TIMES DAILY
Qty: 20 TABLET | Refills: 0 | Status: SHIPPED | OUTPATIENT
Start: 2023-03-21

## 2023-03-21 NOTE — PROGRESS NOTES
1. \"Have you been to the ER, urgent care clinic since your last visit? Hospitalized since your last visit? \" Yes [facility] [place] Ear infection    2. \"Have you seen or consulted any other health care providers outside of the [address] [place] [address] since your last visit? \" No     3. For patients aged 39-70: Has the patient had a colonoscopy / FIT/ Cologuard? No      If the patient is female:    4. For patients aged 41-77: Has the patient had a mammogram within the past 2 years? No      5. For patients aged 21-65: Has the patient had a pap smear? Yes - Care Gap present.  Most recent result on file Subjective:   Date of Consultation:5/9/2018  1:32 PM  Primary care physician:John Lao D.O.    Reason for Consultation:  Mr. Sims  is a pleasant 58 y.o. year old male who presented with follow-up for Rheumatoid Arthritis. Is on the xeljanz (tofacitinib) for the past two and a half months. He stopped taking it last week because he was sick [coughing, shortness of breath, chest congestion, temp of ~ 102.6]. Was started on Keflex by his PCP and he will finish by Friday. He feels better now, and he will restart taking xeljanz (tofacitinib) after he completely recovers.  Has morning stiffness lasting  60 minutes in the morning. Mild pain in the left MCP finger 4 & 3. HE did hold his medication when he was ill.        Rheumatoid Arthritis  He developed a UTI from an enlarged prostate and has been of the xeljanz for 2 weeks at least.  He notes increasing pain in his feet. While on xeljanz he experience a positive result with his joints.  He was able to take off his ring.    Enlarged prostate  Noted to found this  Will see     Osteopenia noted in wrist  Previous fracture: no  Parent fractured hip: no  Current smoking: no  Glucocorticoids use > 3 months: yes  Rheumatoid arthritis: yes  Secondary osteoporosis : no  Alcohol 3 or more units/day: no    DEXA 9/5/2017  The mean bone mineral density for the lumbar spine is 1.196 g/cm2, which corresponds to a T score of -0.2 and a Z score of 0.2.  The distal left forearm has a mean bone mineral density of 0.837 g/cm2, with a T score of -1.5 and a Z score of -1.1.  FRAX scores are 14% & 2.6%    Current Medications  Methotrexate 6 tabs q Wednesday  (stopped)  xeljanz 5 mg BID  Prednisone 2 mg po q day  (he has been on long term prednisone)  meloxicam      RHEUM HISTORY  Mr. Edgardo Sims presented with a history of Rheumatoid Arthritis diagnosed late 1999 and recent RF and CCP were negative.  He was previously a patient of Dr. Mcknight and then followed by a community  rheumatology.  He is here today to establish care. In review of Dr. Echavarria that it seems he has been on Rituxan as far back as 2009. At that time he was on a regimen of azathioprine 150 mg methotrexate 15 mg, prednisone 7.5 mg and rituximab with 125 mg of Solu-Medrol he seemed to have been maintained on this regimen and was tapered down on the Imuran to 50 mg twice a day. Notes from May 24, 2012 indicates that he discontinue the Imuran. At that time he was on rituximab and every 6 months 15 mg of methotrexate weekly and 2 mg of prednisone. Notes from June 8, 2015 does remark that his RA was only manageable he's had limited response. He did suffer a consultation with methotrexate developing stomatitis. Methotrexate was held March 6, 2014 and was reinstituted January 5, 2015.     Dr. Mcknight's notes it does comment that he may benefit from rituximab every 5 months however given cost restrictions he can only get it every 6 months. His last infusion was in March 2017 for rituximab.  He reports he is not doing as well on the rituximab.  He feels his activity level is not as active.  He feels increase stiffness.  His feet xrays     Complications to his care include intolerance to medications  And stomatitis with methotrexate.  Methotrexate was stopped briefly and subsequently was restarted but the patient reports it is not working as well as before.     Previous medications include   azathioprine, methotrexate  Plaquenil and Arava both intolerance not allergies  Remicade loss efficacyt  Gold shots (had a bad reaction)  Enbrel  Not sure Humira  Methylprednisolone did not work as well as prednisone  Never been on Xeljanz   Has not tried Actemra  rituximab (lost efficacy last infusion was 3/2017)  Orencia (not sure why he stopped)    Chronic Pain  On percocet ranging from 30-40 mg daily    Past medical HIstory: bilateral hip replacment and left knee replacement, tonsillitis, varicose vein in the left leg, TMJ,  tonsillitis in 1964, broken right ankle in 1980, ruptured disc of L5-S1 noted in 2000 with discectomy and laminectomy, rotator cuff with anterior labral repair in 2001, basal cell carcinoma in 2005 from the nose, meniscal tear of the left knee in 2008 with scope repair, scope repair and biceps tendon repair as well as rotator cuff surgery in 2011 of the right shoulder. Dupuytren's contracture right hand little finger, Dupuytren's contracture right foot, arthritis of the right foot, plantar fasciitis bilateral, aortic insufficiency, hypertension, dyslipidemia, heart murmur, tachycardia, coronary artery disease with mild plaque at catheter. Hiatal hernia and GERD, scoliosis, kyphosis of the upper back/compensating lordosis of the neck . Vein ligation radiofrequency      Family History: lymphoma     Social History: light bookeeping mainly disability, NEVER smoker, occasional alcohol (once a week)    Past Medical History:   Diagnosis Date   • Abnormal myocardial perfusion study 1/15/2014   • Aortic insufficiency 7/5/2011   • Arthritis     RA, and osteo   • Bronchitis    • CAD (coronary artery disease) mild plaque at cath in 2/14 12/5/2014   • Cancer (HCC)     skin   • Chronic use of opiate drugs therapeutic purposes 8/12/2017   • Cold     mid January 2014   • Coughing blood     none currently 2014   • Dyslipidemia 7/12/2011   • Heart burn    • Heart murmur    • Hiatus hernia syndrome    • HTN (hypertension) 7/5/2011   • Hypertension    • Indigestion    • Infectious disease    • Pain     RA   • Rheumatoid arthritis(714.0)     severe   • Tachycardia 10/20/2014     Past Surgical History:   Procedure Laterality Date   • RECOVERY  2/3/2014    Performed by Cath-Recovery Surgery at SURGERY SAME DAY [facility] ORS   • HIP ARTHROPLASTY TOTAL  5/31/2013    Performed by Burak Valles M.D. at SURGERY [facility] ORS   • KNEE ARTHROPLASTY TOTAL  6/1/2009    Performed by YONATAN HINSON at SURGERY [facility] ORS   • VEIN  LIGATION RADIO FREQUENCY  12/8/2008    Performed by DEREJE MCCULLOUGH at SURGERY SAME DAY [facility] ORS   • HIP ARTHROPLASTY TOTAL  6/20/08    Performed by YONATAN HINSON at SURGERY [facility] ORS   • LUMBAR LAMINECTOMY DISKECTOMY  2000   • TMJ RECONSTRUCTION BILATERAL  1994   • KNEE ARTHROSCOPY      right   • OTHER      varicose vein stripping   • OTHER      heart murmur   • OTHER ORTHOPEDIC SURGERY      awaiting right hip surgery   • SHOULDER SURGERY      RIGHT 01/2011     Allergies   Allergen Reactions   • Crestor [Rosuvastatin Calcium] Myalgia   • Penicillins Hives, Itching and Vomiting     Outpatient Encounter Prescriptions as of 5/9/2018   Medication Sig Dispense Refill   • ciprofloxacin (CIPRO) 500 MG Tab Take 1 Tab by mouth 2 times a day for 7 days. 14 Tab 0   • bisoprolol (ZEBETA) 10 MG tablet Take 10 mg by mouth every day.     • pravastatin (PRAVACHOL) 40 MG tablet Take 1 Tab by mouth every day. 30 Tab 11   • fluticasone (FLONASE) 50 MCG/ACT nasal spray Spray 1 Spray in nose as needed.     • predniSONE (DELTASONE) 1 MG Tab TAKE 2 TABLETS BY MOUTH EVERY DAY 60 Tab 1   • meloxicam (MOBIC) 15 MG tablet TAKE 1 TABLET BY MOUTH EVERY DAY 30 Tab 11   • diazePAM (VALIUM) 5 MG Tab Take 5 mg by mouth every 6 hours as needed for Anxiety or Sleep.     • CALCIUM-VITAMIN D PO Take 1 Tab by mouth 2 Times a Day.     • ascorbic acid (ASCORBIC ACID) 500 MG Tab Take 500 mg by mouth every day.     • [START ON 6/10/2018] oxyCODONE-acetaminophen (PERCOCET-10)  MG Tab Take 1 Tab by mouth 5 Times a Day for 30 days. 150 Tab 0   • Garlic 1000 MG Cap Take 1 Cap by mouth every day.     • vitamin e (VITAMIN E) 400 UNIT Cap Take 400 Units by mouth every day.     • lisinopril (PRINIVIL) 10 MG Tab TAKE ONE TABLET BY MOUTH ONCE DAILY 90 Tab 2   • cyclobenzaprine (FLEXERIL) 10 MG Tab TAKE ONE TABLET BY MOUTH THREE TIMES DAILY AS NEEDED FOR MILD PAIN 90 Tab 5   • Cyanocobalamin (VITAMIN B-12) 5000 MCG TABLET DISPERSIBLE Take 1 Tab  by mouth every day.     • niacin 500 MG Tab Take 500 mg by mouth every day.     • paroxetine (PAXIL) 20 MG Tab TAKE ONE TABLET BY MOUTH ONCE DAILY 90 Tab 3   • Multiple Vitamins-Minerals (MULTI COMPLETE PO) Take 1 Tab by mouth every day.     • vardenafil (LEVITRA) 20 MG tablet Take 20 mg by mouth as needed.     • Tofacitinib Citrate (XELJANZ) 5 MG Tab Take 5 mg by mouth 2 Times a Day. SAMPLE 60 Tab 0   • testosterone cypionate (DEPO-TESTOSTERONE) 200 MG/ML Solution injection 1 mL by Intramuscular route every 14 days for 130 days. 10 mL 1   • Zinc 50 MG Cap Take 50 mg by mouth every day.     • omeprazole (PRILOSEC) 20 MG CPDR Take 20 mg by mouth every day.       No facility-administered encounter medications on file as of 5/9/2018.        Social History     Social History   • Marital status:      Spouse name: N/A   • Number of children: N/A   • Years of education: N/A     Occupational History   • Not on file.     Social History Main Topics   • Smoking status: Never Smoker   • Smokeless tobacco: Never Used   • Alcohol use 3.0 oz/week     6 Cans of beer per week   • Drug use: No   • Sexual activity: Yes     Partners: Female     Other Topics Concern   •  Service No   • Blood Transfusions No   • Caffeine Concern No   • Occupational Exposure No   • Hobby Hazards Yes     rides motorcyle   • Sleep Concern No   • Stress Concern No   • Weight Concern Yes   • Special Diet Yes     does not eat red meat    • Back Care Yes   • Exercise Yes   • Bike Helmet Yes   • Seat Belt Yes   • Self-Exams Yes     Social History Narrative   • No narrative on file      History   Smoking Status   • Never Smoker   Smokeless Tobacco   • Never Used     History   Alcohol Use   • 3.0 oz/week   • 6 Cans of beer per week     History   Drug Use No         Family History   Problem Relation Age of Onset   • Hypertension Mother        Review of Systems   Constitutional: Negative for chills and fever.   HENT: Negative for sore throat.    Eyes:  Negative for photophobia, pain and redness.   Respiratory: Negative for shortness of breath, wheezing and stridor.    Cardiovascular: Negative for chest pain and palpitations.   Gastrointestinal: Negative for abdominal pain, nausea and vomiting.   Musculoskeletal: Positive for back pain and joint pain.   Skin: Negative for rash.   Neurological: Negative for seizures and loss of consciousness.   Psychiatric/Behavioral: Negative for depression. The patient does not have insomnia.         Objective:   /90   Pulse 68   Temp 36.7 °C (98 °F)   Resp 14   Wt 90.3 kg (199 lb)   SpO2 98%   BMI 28.55 kg/m²     Physical Exam   Constitutional: He is oriented to person, place, and time. He appears well-developed and well-nourished. No distress.   HENT:   Head: Normocephalic and atraumatic.   Right Ear: External ear normal.   Left Ear: External ear normal.   Eyes: Conjunctivae are normal. Right eye exhibits no discharge. Left eye exhibits no discharge. No scleral icterus.   Cardiovascular: Normal rate and regular rhythm.    Murmur heard.  Grade III systolic murmur   Pulmonary/Chest: Effort normal. No stridor. No respiratory distress. He has no wheezes. He has no rales.   Abdominal: Soft.   Musculoskeletal: He exhibits no edema.   Kyphoses of the thoracic spine, lordosis of the cervical spine .increase tenderness of the MTP bilateral.  Mild bogginess of the MCP with tenderness to palpation.  Scare on right little finger for duputren contraction   Lymphadenopathy:     He has no cervical adenopathy.   Neurological: He is alert and oriented to person, place, and time.   Skin: Skin is warm and dry. No rash noted. He is not diaphoretic. No erythema. No pallor.   Psychiatric: He has a normal mood and affect. His behavior is normal. Judgment and thought content normal.       Assessment:     1. Encounter for long-term (current) use of high-risk medication  CBC WITH DIFFERENTIAL    COMP METABOLIC PANEL    CRP QUANTITIVE  (NON-CARDIAC)    WESTERGREN SED RATE    LIPID PROFILE   2. Rheumatoid arthritis with positive rheumatoid factor, involving unspecified site (HCC)     3. Urinary tract infection without hematuria, site unspecified     4. Enlarged prostate       Labs:      Lab Results   Component Value Date/Time    QNTTBGOLD Negative 06/01/2017 01:13 PM     Lab Results   Component Value Date/Time    HEPBCORTOT Negative 06/01/2017 01:13 PM    HEPBSAG Negative 06/01/2017 01:13 PM     Lab Results   Component Value Date/Time    HEPCAB Negative 06/01/2017 01:13 PM     Lab Results   Component Value Date/Time    SODIUM 136 05/01/2018 05:03 AM    POTASSIUM 4.6 05/01/2018 05:03 AM    CHLORIDE 104 05/01/2018 05:03 AM    CO2 27 05/01/2018 05:03 AM    GLUCOSE 99 05/01/2018 05:03 AM    BUN 7 (L) 05/01/2018 05:03 AM    CREATININE 0.72 05/01/2018 05:03 AM    CREATININE 1.1 04/21/2009 03:50 PM    BUNCREATRAT 13 09/21/2016 09:21 AM      Lab Results   Component Value Date/Time    WBC 10.1 05/01/2018 05:02 AM    WBC 7.5 07/01/2011 10:30 AM    RBC 4.41 (L) 05/01/2018 05:02 AM    RBC 4.72 07/01/2011 10:30 AM    HEMOGLOBIN 14.8 05/01/2018 05:02 AM    HEMATOCRIT 42.5 05/01/2018 05:02 AM    MCV 96.4 05/01/2018 05:02 AM     (H) 07/01/2011 10:30 AM    MCH 33.6 (H) 05/01/2018 05:02 AM    MCH 36.0 (H) 07/01/2011 10:30 AM    MCHC 34.8 05/01/2018 05:02 AM    MPV 9.4 05/01/2018 05:02 AM    NEUTSPOLYS 68.40 04/30/2018 10:52 AM    LYMPHOCYTES 11.20 (L) 04/30/2018 10:52 AM    MONOCYTES 16.20 (H) 04/30/2018 10:52 AM    EOSINOPHILS 2.30 04/30/2018 10:52 AM    BASOPHILS 0.70 04/30/2018 10:52 AM    HYPOCHROMIA 1+ 03/05/2014 04:43 PM    ANISOCYTOSIS 1+ 01/02/2015 05:02 PM      Lab Results   Component Value Date/Time    CALCIUM 8.6 05/01/2018 05:03 AM    ASTSGOT 34 04/30/2018 10:52 AM    ALTSGPT 32 04/30/2018 10:52 AM    ALKPHOSPHAT 96 04/30/2018 10:52 AM    TBILIRUBIN 0.8 04/30/2018 10:52 AM    ALBUMIN 4.5 04/30/2018 10:52 AM    TOTPROTEIN 8.1 04/30/2018 10:52 AM      Lab Results   Component Value Date/Time    RHEUMFACTN <10 08/04/2017 02:32 PM     Lab Results   Component Value Date/Time    SEDRATEWES 13 04/26/2018 03:03 PM    CREACTPROT 5.73 (H) 04/26/2018 03:03 PM     No results found for: RUSSELVIPER, DRVVTINTERP  Lab Results   Component Value Date/Time    CRYOGLOBULIN NEG 72Hour 08/04/2017 02:32 PM     No results found for: ANADIRECT, ANTIDNADS, RNPAB, SMITHAB, [id number], SSAROAB, SSBLAAB, SFTECF9XR, CENTROMBAB  No results found for: ANTIDNADS, DSDNA, AGBMAB, GBMABA, ANCAIGG, [id number], JO1AB, RNPAB, ANTISSASJ, ANTISSBSJ  Lab Results   Component Value Date/Time    COLORURINE Yellow 04/30/2018 12:03 PM    SPECGRAVITY <=1.005 04/30/2018 12:03 PM    PHURINE 7.5 04/30/2018 12:03 PM    GLUCOSEUR Negative 04/30/2018 12:03 PM    KETONES Negative 04/30/2018 12:03 PM    PROTEINURIN Negative 04/30/2018 12:03 PM     Lab Results   Component Value Date/Time    TOTALVOLUME 2,750 08/18/2015 07:30 AM    TOTALVOLUME 2,750 08/18/2015 07:30 AM     No results found for: SSA60, SSA52  No results found for: HBA1C  Lab Results   Component Value Date/Time    CPKTOTAL 141 01/26/2018 07:47 AM     No results found for: G6PD  No results found for: AQRI57AGOJ  No results found for: ACESERUM  Lab Results   Component Value Date/Time    25HYDROXY 18 (L) 06/01/2017 01:13 PM     Lab Results   Component Value Date/Time    FREEDIR 1.11 12/01/2014 03:13 PM     No results found for: TSHULTRASEN, FREET4  No results found for: MICROSOMALA, ANTITHYROGL  No results found for: IGGLYMEABS  No results found for: ANTIMITOCHO, FACTIN  No results found for: IGA, TTRANSIGA, ENDOIGA  No results found for: FLTYPE, CRYSTALSBDF  No results found for: ISTATICAL  No results found for: ISTATCREAT  No results found for: CTELOPEP  No results found for: GBMABG  Lab Results   Component Value Date/Time    PTHINTACT 55 10/10/2008 10:42 AM         Medical Decision Making:  Today's Assessment / Status / Plan:     Rheumatoid  Arthritis  Hold xeljanz until antibiotics are completed.  We will see what urology wants to do and if there is any concern for ongoing infection.  If not will restart xelajnz.  If there is a reason to hold xeljanz then consider increasing the prednisone to 5mg since his joints are beginning to flare.    Chronic steroid use  DEXA showed osteopenia  FRAX scores are 14% & 2.6%, do not warrant treatment at this point, will repeat DEXA scan in two years. Sep 2019   Recheck lab at 3 months.  Continue Calcium and vitamin D    Results for CANDI UMANA (MRN [id number]) as of 6/23/2017 18:44   Ref. Range 6/1/2017 13:13   Hep B Surface Antibody Quant Latest Ref Range: 0.00-10.00 mIU/mL <3.10   Hepatitis B Surface Antigen Latest Ref Range: Negative  Negative   Hepatitis B Ccore Ab, Total Latest Ref Range: Negative  Negative   Hepatitis C Antibody Latest Ref Range: Negative  Negative   Mitogen-Nil Unknown 60.763   Nil Unknown 0.029   Quantiferon TB Gold Latest Ref Range: Negative  Negative   TB Ag-Nil Unknown -0.010     Enlarged prostate  Will see urology    Aortic insufficiency  Not symptomatic  Followed by cardiology    S/p hand surgery  Doing well    1. Encounter for long-term (current) use of high-risk medication  CBC WITH DIFFERENTIAL    COMP METABOLIC PANEL    CRP QUANTITIVE (NON-CARDIAC)    WESTERGREN SED RATE    LIPID PROFILE   2. Rheumatoid arthritis with positive rheumatoid factor, involving unspecified site (HCC)     3. Urinary tract infection without hematuria, site unspecified     4. Enlarged prostate           Return in about 3 months (around 8/9/2018).         Please note that this dictation was created using voice recognition software. I have made every reasonable attempt to correct obvious errors, but I expect that there are errors of grammar and possibly content that I did not discover before finalizing the note.

## 2023-03-27 ENCOUNTER — TELEPHONE (OUTPATIENT)
Dept: INTERNAL MEDICINE CLINIC | Age: 63
End: 2023-03-27

## 2023-03-27 NOTE — TELEPHONE ENCOUNTER
Regarding visit with Tresa Gomez on 3/21    States amoxacillin not working    Wants something stronger,    States ears hurting bad and has cough/congestion    Send to [employer] on [address]

## 2023-03-27 NOTE — TELEPHONE ENCOUNTER
Jeremy Franklin MD  You 6 minutes ago (4:04 PM)     CI  She needs to be evaluated by ENT. Patient notified of provider response, she would like MD to reconsider     Patient states she is not sleeping well at night due to cough, her ears are hurting, feel clogged up, she states her cough is non productive but does sound congested.      Patient states she cannot set up ENT appt until after 4/1/23 due to changing of her insurance    Patient is taking Guaifenesin 400 mg tablets 4 times daily with no relief    Patient is concerned about her upcoming trip to [facility] in a couple of weeks and is hopeful another course of antibiotics will help

## 2023-03-27 NOTE — TELEPHONE ENCOUNTER
Pt called earlier today but has not heard anything back yet. Pt now concerned it might turn into bronchitis. Pt states amoxicillin isn't working. Pt states already finishing a z-pack almost 2 weeks ago. Pt states struggling to breath and ear infection is getting worse. Pt states needing stronger prescription by the end of today. Please advise.

## 2023-03-28 ENCOUNTER — TELEPHONE (OUTPATIENT)
Dept: INTERNAL MEDICINE CLINIC | Age: 63
End: 2023-03-28

## 2023-03-28 ENCOUNTER — TELEPHONE (OUTPATIENT)
Dept: NEUROSURGERY | Age: 63
End: 2023-03-28

## 2023-03-28 DIAGNOSIS — H93.8X1 SENSATION OF FULLNESS IN RIGHT EAR: Primary | ICD-10-CM

## 2023-03-28 NOTE — TELEPHONE ENCOUNTER
----- Message from Mir Calhoun sent at 3/28/2023  2:59 PM EDT -----  Subject: Referral Request    Reason for referral request? Pt needs an ENT   Provider patient wants to be referred to(if known):     Provider Phone Number(if known): Additional Information for Provider?  Pt needs to get in asap The current   referral cannot get her in and the Number that was given to her for [facility] is not in service.   ---------------------------------------------------------------------------  --------------  Ria Milian GL    [phone]; OK to leave message on voicemail  ---------------------------------------------------------------------------  --------------

## 2023-03-28 NOTE — TELEPHONE ENCOUNTER
LVM for patient reminding her that she still needs to schedule her imaging. I mentioned that we have a follow-up appt for her on 4/24/2023 and if she cannot get her imaging done in time, we will need to reschedule that follow-up appt.

## 2023-04-04 ENCOUNTER — OFFICE VISIT (OUTPATIENT)
Dept: INTERNAL MEDICINE CLINIC | Age: 63
End: 2023-04-04
Payer: MEDICARE

## 2023-04-04 PROCEDURE — G8427 DOCREV CUR MEDS BY ELIG CLIN: HCPCS | Performed by: INTERNAL MEDICINE

## 2023-04-04 PROCEDURE — G8420 CALC BMI NORM PARAMETERS: HCPCS | Performed by: INTERNAL MEDICINE

## 2023-04-04 PROCEDURE — 99214 OFFICE O/P EST MOD 30 MIN: CPT | Performed by: INTERNAL MEDICINE

## 2023-04-04 PROCEDURE — G8432 DEP SCR NOT DOC, RNG: HCPCS | Performed by: INTERNAL MEDICINE

## 2023-04-04 PROCEDURE — G0463 HOSPITAL OUTPT CLINIC VISIT: HCPCS | Performed by: INTERNAL MEDICINE

## 2023-04-04 PROCEDURE — 3017F COLORECTAL CA SCREEN DOC REV: CPT | Performed by: INTERNAL MEDICINE

## 2023-04-04 RX ORDER — PREDNISONE 20 MG/1
40 TABLET ORAL
Qty: 10 TABLET | Refills: 0 | Status: SHIPPED
Start: 2023-04-04

## 2023-04-04 NOTE — PROGRESS NOTES
Ms. 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  Presenting to discuss right ear pain when she went to [facility] head  She had ear pain and given amoxicillin then changed to z pack early March 2023  Finished z pack and amoxicillin  Feels like ear is constantly plugged  Taken mucinex   Cannot tolerate pseudoephedrine  Taking zyrtec   Flonase made       Has ENT appointment on April 20th      Hypothyroidism taking 75 mcg 6 days of the week   [place] at [employer] every summer    Review of systems:  Constitutional: negative for fever, chills, weight loss, night sweats   Occasional palpitations      Past Medical History:   Diagnosis Date    Aneurysm ([address] 06/15/2020    Coiled and stented    Anxiety     At risk for malignant hyperthermia     MH PRECAUTIONS    Gallbladder polyp     Hypothyroid     Neurological disorder         Past Surgical History:   Procedure Laterality Date    HX GYN      HX OVARIAN CYST REMOVAL      HX OVARIAN CYST REMOVAL Right        Allergies   Allergen Reactions    Acyclovir Nausea and Vomiting     Other reaction(s): Vomiting    Morphine Nausea and Vomiting    Pseudoephedrine Hcl Unknown (comments)    Pseudoephedrine Palpitations     Other reaction(s): Unknown (comments)       Current Outpatient Medications on File Prior to Visit   Medication Sig Dispense Refill    levothyroxine (Synthroid) 75 mcg tablet TAKE 1 TABLET ONCE DAILY   BEFORE BREAKFAST 30 Tablet 0    aspirin 81 mg chewable tablet Take 2 Tablets by mouth daily. Indications: Cerebral aneurysm 60 Tablet 5    guaiFENesin ER (MUCINEX) 600 mg ER tablet Take 1 Tablet by mouth two (2) times a day.  (Patient not taking: Reported on 4/4/2023) 20 Tablet 0    ondansetron hcl (Zofran) 4 mg tablet Take 1 Tablet by mouth every eight (8) hours as needed for Nausea. 20 Tablet 0     No current facility-administered medications on file prior to visit. family history includes Cancer in her maternal grandfather and paternal grandmother; Colon Cancer (age of onset: 80) in her father; Dementia in her father; Diabetes in her maternal grandmother; Hypertension in her mother; No Known Problems in her brother, maternal aunt, maternal uncle, paternal aunt, paternal grandfather, paternal uncle, and sister.     Social History     Socioeconomic History    Marital status:      Spouse name: Not on file    Number of children: Not on file    Years of education: Not on file    Highest education level: Not on file   Occupational History    Not on file   Tobacco Use    Smoking status: Never    Smokeless tobacco: Never   Vaping Use    Vaping Use: Never used   Substance and Sexual Activity    Alcohol use: Not Currently     Comment: rarealy    Drug use: Never    Sexual activity: Yes     Partners: Male     Birth control/protection: Condom   Other Topics Concern    Not on file   Social History Narrative    ** Merged History Encounter **          Social Determinants of Health     Financial Resource Strain: Low Risk     Difficulty of Paying Living Expenses: Not hard at all   Food Insecurity: No Food Insecurity    Worried About Running Out of Food in the Last Year: Never true    Ran Out of Food in the Last Year: Never true   Transportation Needs: Not on file   Physical Activity: Not on file   Stress: Not on file   Social Connections: Not on file   Intimate Partner Violence: Not on file   Housing Stability: Not on file       Visit Vitals  BP (!) 98/49 (BP 1 Location: Right upper arm, BP Patient Position: Sitting, BP Cuff Size: Adult)   Pulse 68   Temp 97.4 °F (36.3 °C) (Temporal)   Resp 18   Ht 5' 2\" (1.575 m)   Wt 111 lb 9.6 oz (50.6 kg)   SpO2 99%   BMI 20.41 kg/m²     General:  Well appearing female no acute distress  HEENT: PERRL,normal conjunctiva. External ear and canals normal, TMs normal.  Hearing normal to voice. Nose without edema or discharge, normal septum. Lips, teeth, gums normal.  Oropharynx: no erythema, no exudates, no lesions, normal tongue. Neck:  Supple. Thyroid normal size, nontender, without nodules. No carotid bruit. No masses or lymphadenopathy  Respiratory: no respiratory distress,  no wheezing, no rhonchi, no rales. No chest wall tenderness. Cardiovascular:  RRR, normal S1S2, no murmur. Gastrointestinal: normal bowel sounds, soft, nontender, without masses. No hepatosplenomegaly. Extremities +2 pulses, no edema, normal sensation   Musculoskeletal:  Normal gait. Normal digits and nails. Normal strength and tone, no atrophy, and no abnormal movement. Skin:  No rash, no lesions, no ulcers. Skin warm, normal turgor, without induration or nodules. Neuro:  A and OX4, fluent speech, cranial nerves normal 2-12. Sensation normal to light touch.   DTR symmetrical  Psych:  Normal affect      Lab Results   Component Value Date/Time    WBC 7.5 08/28/2022 09:19 PM    HGB 13.1 08/28/2022 09:19 PM    HCT 38.4 08/28/2022 09:19 PM    PLATELET 442 67/92/1266 09:19 PM    MCV 89.7 08/28/2022 09:19 PM     Lab Results   Component Value Date/Time    Sodium 138 08/28/2022 09:19 PM    Potassium 4.1 08/28/2022 09:19 PM    Chloride 106 08/28/2022 09:19 PM    CO2 24 08/28/2022 09:19 PM    Anion gap 8 08/28/2022 09:19 PM    Glucose 119 (H) 08/28/2022 09:19 PM    BUN 24 (H) 08/28/2022 09:19 PM    Creatinine 0.87 08/28/2022 09:19 PM    BUN/Creatinine ratio 28 (H) 08/28/2022 09:19 PM    GFR est AA >60 08/28/2022 09:19 PM    GFR est non-AA >60 08/28/2022 09:19 PM    Calcium 9.6 08/28/2022 09:19 PM     Lab Results   Component Value Date/Time    Cholesterol, total 181 02/02/2021 09:35 AM    HDL Cholesterol 73 02/02/2021 09:35 AM    LDL, calculated 96 02/02/2021 09:35 AM    LDL, calculated 120 (H) 01/30/2018 08:01 AM    VLDL, calculated 12 02/02/2021 09:35 AM    VLDL, calculated 9 01/30/2018 08:01 AM    Triglyceride 62 02/02/2021 09:35 AM     Lab Results   Component Value Date/Time    TSH 0.59 05/24/2022 10:01 AM     No results found for: HBA1C, HZT4OYMP, ZCD6PJFC, RLU4KYXN  Lab Results   Component Value Date/Time    VITAMIN D, 25-HYDROXY 65.6 09/14/2021 12:00 AM                   Assessment and Plan:     1. Congestion of right ear  Recent ear infection, no infection today remains with sensation of congestion. She is on anti histamine, did not tolerate nasal spray and cannot take decongestants. Trial of   - predniSONE (DELTASONE) 20 mg tablet; Take 2 Tablets by mouth daily (with breakfast). Dispense: 10 Tablet; Refill: 0    2. Acquired hypothyroidism  Taking levothyroxine 75 mcg 6 days a week , euthyroid on exam  - TSH 3RD GENERATION; Future  - T4, FREE; Future  - METABOLIC PANEL, COMPREHENSIVE; Future  - CBC WITH AUTOMATED DIFF; Future  - LIPID PANEL; Future    3. Idiopathic hypotension asymptomatic  - CORTISOL, AM; Future    4. Screening cholesterol level  - LIPID PANEL; Future    5.  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

## 2023-04-04 NOTE — PROGRESS NOTES
Chief Complaint   Patient presents with    Ear Fullness          1. \"Have you been to the ER, urgent care clinic since your last visit? Hospitalized since your last visit? yes, 08/28/22, [facility] see chart. 2. \"Have you seen or consulted any other health care providers outside of the [address] [place] [address] since your last visit?no    3. For patients aged 39-70: Has the patient had a colonoscopy / FIT/ Cologuard? Yes - no Care Gap present      If the patient is female:    4. For patients aged 41-77: Has the patient had a mammogram within the past 2 years? Yes - no Care Gap present      5. For patients aged 21-65: Has the patient had a pap smear?  Yes - no Care Gap present

## 2023-04-17 ENCOUNTER — HOSPITAL ENCOUNTER (OUTPATIENT)
Dept: MRI IMAGING | Age: 63
Discharge: HOME | End: 2023-04-17
Attending: RADIOLOGY
Payer: MEDICARE

## 2023-04-17 DIAGNOSIS — I67.1 CEREBRAL ANEURYSM: ICD-10-CM

## 2023-04-17 PROCEDURE — 74011250636 HC RX REV CODE- 250/636: Performed by: RADIOLOGY

## 2023-04-17 PROCEDURE — 70546 MR ANGIOGRAPH HEAD W/O&W/DYE: CPT

## 2023-04-17 PROCEDURE — A9576 INJ PROHANCE MULTIPACK: HCPCS | Performed by: RADIOLOGY

## 2023-04-17 RX ADMIN — GADOTERIDOL 10 ML: 279.3 INJECTION, SOLUTION INTRAVENOUS at 14:00

## 2023-04-20 DIAGNOSIS — H93.8X1 CONGESTION OF RIGHT EAR: ICD-10-CM

## 2023-04-20 RX ORDER — PREDNISONE 20 MG/1
40 TABLET ORAL
Qty: 10 TABLET | Refills: 0 | Status: SHIPPED | OUTPATIENT
Start: 2023-04-20

## 2023-04-22 DIAGNOSIS — N20.0 NEPHROLITHIASIS: Primary | ICD-10-CM

## 2023-04-22 DIAGNOSIS — I67.1 CEREBRAL ANEURYSM: Primary | ICD-10-CM

## 2023-05-16 ENCOUNTER — OFFICE VISIT (OUTPATIENT)
Age: 63
End: 2023-05-16
Payer: MEDICARE

## 2023-05-16 VITALS
BODY MASS INDEX: 20.43 KG/M2 | RESPIRATION RATE: 18 BRPM | HEIGHT: 62 IN | DIASTOLIC BLOOD PRESSURE: 60 MMHG | HEART RATE: 60 BPM | OXYGEN SATURATION: 99 % | WEIGHT: 111 LBS | SYSTOLIC BLOOD PRESSURE: 110 MMHG

## 2023-05-16 VITALS
OXYGEN SATURATION: 98 % | HEIGHT: 62 IN | WEIGHT: 111.8 LBS | HEART RATE: 71 BPM | SYSTOLIC BLOOD PRESSURE: 120 MMHG | BODY MASS INDEX: 20.57 KG/M2 | TEMPERATURE: 96.4 F | DIASTOLIC BLOOD PRESSURE: 72 MMHG

## 2023-05-16 DIAGNOSIS — Z23 ENCOUNTER FOR IMMUNIZATION: ICD-10-CM

## 2023-05-16 DIAGNOSIS — E03.8 HYPOTHYROIDISM DUE TO HASHIMOTO'S THYROIDITIS: ICD-10-CM

## 2023-05-16 DIAGNOSIS — I67.1 CEREBRAL ANEURYSM: Primary | ICD-10-CM

## 2023-05-16 DIAGNOSIS — E06.3 HYPOTHYROIDISM DUE TO HASHIMOTO'S THYROIDITIS: ICD-10-CM

## 2023-05-16 DIAGNOSIS — Z00.00 WELCOME TO MEDICARE PREVENTIVE VISIT: Primary | ICD-10-CM

## 2023-05-16 PROCEDURE — G8400 PT W/DXA NO RESULTS DOC: HCPCS | Performed by: INTERNAL MEDICINE

## 2023-05-16 PROCEDURE — 3017F COLORECTAL CA SCREEN DOC REV: CPT | Performed by: INTERNAL MEDICINE

## 2023-05-16 PROCEDURE — 90677 PCV20 VACCINE IM: CPT | Performed by: INTERNAL MEDICINE

## 2023-05-16 PROCEDURE — 1036F TOBACCO NON-USER: CPT | Performed by: INTERNAL MEDICINE

## 2023-05-16 PROCEDURE — 1123F ACP DISCUSS/DSCN MKR DOCD: CPT | Performed by: INTERNAL MEDICINE

## 2023-05-16 PROCEDURE — G8420 CALC BMI NORM PARAMETERS: HCPCS | Performed by: INTERNAL MEDICINE

## 2023-05-16 PROCEDURE — 1123F ACP DISCUSS/DSCN MKR DOCD: CPT | Performed by: RADIOLOGY

## 2023-05-16 PROCEDURE — G8420 CALC BMI NORM PARAMETERS: HCPCS | Performed by: RADIOLOGY

## 2023-05-16 PROCEDURE — 1036F TOBACCO NON-USER: CPT | Performed by: RADIOLOGY

## 2023-05-16 PROCEDURE — 1090F PRES/ABSN URINE INCON ASSESS: CPT | Performed by: RADIOLOGY

## 2023-05-16 PROCEDURE — PBSHW PNEUMOCOCCAL, PCV20, PREVNAR 20, (AGE 18 YRS+), IM, PF: Performed by: INTERNAL MEDICINE

## 2023-05-16 PROCEDURE — G8427 DOCREV CUR MEDS BY ELIG CLIN: HCPCS | Performed by: RADIOLOGY

## 2023-05-16 PROCEDURE — 99214 OFFICE O/P EST MOD 30 MIN: CPT | Performed by: RADIOLOGY

## 2023-05-16 PROCEDURE — 90732 PPSV23 VACC 2 YRS+ SUBQ/IM: CPT | Performed by: INTERNAL MEDICINE

## 2023-05-16 PROCEDURE — G0009 ADMIN PNEUMOCOCCAL VACCINE: HCPCS | Performed by: INTERNAL MEDICINE

## 2023-05-16 PROCEDURE — G8400 PT W/DXA NO RESULTS DOC: HCPCS | Performed by: RADIOLOGY

## 2023-05-16 PROCEDURE — 99214 OFFICE O/P EST MOD 30 MIN: CPT | Performed by: INTERNAL MEDICINE

## 2023-05-16 PROCEDURE — G8427 DOCREV CUR MEDS BY ELIG CLIN: HCPCS | Performed by: INTERNAL MEDICINE

## 2023-05-16 PROCEDURE — 3017F COLORECTAL CA SCREEN DOC REV: CPT | Performed by: RADIOLOGY

## 2023-05-16 PROCEDURE — 1090F PRES/ABSN URINE INCON ASSESS: CPT | Performed by: INTERNAL MEDICINE

## 2023-05-16 RX ORDER — ZOSTER VACCINE RECOMBINANT, ADJUVANTED 50 MCG/0.5
0.5 KIT INTRAMUSCULAR SEE ADMIN INSTRUCTIONS
Qty: 0.5 ML | Refills: 0 | Status: SHIPPED | OUTPATIENT
Start: 2023-05-16 | End: 2023-11-12

## 2023-05-16 RX ORDER — LEVOTHYROXINE SODIUM 0.07 MG/1
TABLET ORAL
Qty: 90 TABLET | Refills: 1 | Status: SHIPPED | OUTPATIENT
Start: 2023-05-16

## 2023-05-16 SDOH — ECONOMIC STABILITY: INCOME INSECURITY: HOW HARD IS IT FOR YOU TO PAY FOR THE VERY BASICS LIKE FOOD, HOUSING, MEDICAL CARE, AND HEATING?: NOT HARD AT ALL

## 2023-05-16 SDOH — ECONOMIC STABILITY: FOOD INSECURITY: WITHIN THE PAST 12 MONTHS, YOU WORRIED THAT YOUR FOOD WOULD RUN OUT BEFORE YOU GOT MONEY TO BUY MORE.: NEVER TRUE

## 2023-05-16 SDOH — ECONOMIC STABILITY: HOUSING INSECURITY
IN THE LAST 12 MONTHS, WAS THERE A TIME WHEN YOU DID NOT HAVE A STEADY PLACE TO SLEEP OR SLEPT IN A SHELTER (INCLUDING NOW)?: NO

## 2023-05-16 SDOH — ECONOMIC STABILITY: FOOD INSECURITY: WITHIN THE PAST 12 MONTHS, THE FOOD YOU BOUGHT JUST DIDN'T LAST AND YOU DIDN'T HAVE MONEY TO GET MORE.: NEVER TRUE

## 2023-05-16 ASSESSMENT — LIFESTYLE VARIABLES
HOW OFTEN DO YOU HAVE A DRINK CONTAINING ALCOHOL: MONTHLY OR LESS
HOW MANY STANDARD DRINKS CONTAINING ALCOHOL DO YOU HAVE ON A TYPICAL DAY: PATIENT DOES NOT DRINK

## 2023-05-16 ASSESSMENT — PATIENT HEALTH QUESTIONNAIRE - PHQ9
SUM OF ALL RESPONSES TO PHQ QUESTIONS 1-9: 0
2. FEELING DOWN, DEPRESSED OR HOPELESS: 0
1. LITTLE INTEREST OR PLEASURE IN DOING THINGS: 0
SUM OF ALL RESPONSES TO PHQ QUESTIONS 1-9: 0
SUM OF ALL RESPONSES TO PHQ9 QUESTIONS 1 & 2: 0

## 2023-05-16 NOTE — PROGRESS NOTES
Annual follow up for Cerebral aneurysm. Patient reports she is asymptomatic at this time. Denies headaches, dizziness, numbness or tingling, blurred or double vision, n/v.  No acute problems voiced.
vaccine [facility]) 50 MCG/0.5ML SUSR injection Inject 0.5 mLs into the muscle See Admin Instructions 1 dose now and repeat in 2-6 months (Patient not taking: Reported on 5/16/2023) 0.5 mL 0     No current facility-administered medications for this visit. Allergies   Allergen Reactions    Acyclovir Nausea And Vomiting     Other reaction(s): Vomiting  Other reaction(s): GI intolerance, Vomiting  Other reaction(s): Vomiting  Other reaction(s): Vomiting      Pseudoephedrine Palpitations and Other (See Comments)     Other reaction(s): Unknown (comments)  Other reaction(s): Unknown (comments)  Other reaction(s): Unknown (comments)  Other reaction(s): Tachycardia    Pseudoephedrine Hcl      Other reaction(s): Unknown (comments)    Estriol      Other reaction(s): Unknown    Morphine Nausea And Vomiting       Review of Systems:  Pertinent items are noted in the History of Present Illness. Objective:     Vitals:    05/16/23 1526   BP: 120/72   Site: Left Upper Arm   Position: Sitting   Pulse: 71   Temp: (!) 96.4 °F (35.8 °C)   TempSrc: Temporal   SpO2: 98%   Weight: 111 lb 12.8 oz (50.7 kg)   Height: 5' 2\" (1.575 m)        Physical Exam:  GENERAL: alert, cooperative, no distress, appears stated age  EYE: negative  NECK: no carotid bruit  LUNG: clear to auscultation bilaterally  HEART: regular rate and rhythm, S1, S2 normal, no murmur, click, rub or gallop  EXTREMITIES:  extremities normal, atraumatic, no cyanosis or edema    Neurologic Exam:  Mental Status:  Alert and oriented x 4. Appropriate affect, mood and behavior. Language:    Normal fluency, repetition, comprehension and naming. Cranial Nerves:   Pupils equal, round and reactive to light. Visual fields full to confrontation. Extraocular movements intact. Facial sensation intact V1 - V3. Full facial strength, no asymmetry. Hearing intact bilaterally. No dysarthria. Tongue protrudes to midline, palate elevates symmetrically.

## 2023-05-24 DIAGNOSIS — E06.3 HYPOTHYROIDISM DUE TO HASHIMOTO'S THYROIDITIS: ICD-10-CM

## 2023-05-24 DIAGNOSIS — E03.8 HYPOTHYROIDISM DUE TO HASHIMOTO'S THYROIDITIS: ICD-10-CM

## 2023-05-24 RX ORDER — LEVOTHYROXINE SODIUM 0.07 MG/1
TABLET ORAL
Qty: 90 TABLET | Refills: 1 | Status: SHIPPED | OUTPATIENT
Start: 2023-05-24

## 2023-05-24 NOTE — TELEPHONE ENCOUNTER
PCP: Kwasi Dowling MD    Last appt:   5/16/2023    No future appointments.     Requested Prescriptions     Pending Prescriptions Disp Refills    levothyroxine (SYNTHROID) 75 MCG tablet 90 tablet 1     Sig: TAKE 1 TABLET ONCE DAILY   BEFORE BREAKFAST

## 2023-06-22 ENCOUNTER — HOSPITAL ENCOUNTER (OUTPATIENT)
Facility: HOSPITAL | Age: 63
Discharge: HOME | End: 2023-06-22
Payer: MEDICARE

## 2023-06-22 DIAGNOSIS — H69.80 ETD (EUSTACHIAN TUBE DYSFUNCTION), UNSPECIFIED LATERALITY: ICD-10-CM

## 2023-06-22 DIAGNOSIS — H90.3 ASYMMETRIC SNHL (SENSORINEURAL HEARING LOSS): ICD-10-CM

## 2023-06-22 DIAGNOSIS — Z78.9 NON-SMOKER: ICD-10-CM

## 2023-06-22 DIAGNOSIS — Z00.8 ENCOUNTER FOR OTHER GENERAL EXAMINATION: ICD-10-CM

## 2023-06-22 PROCEDURE — A9579 GAD-BASE MR CONTRAST NOS,1ML: HCPCS

## 2023-06-22 PROCEDURE — 6360000004 HC RX CONTRAST MEDICATION

## 2023-06-22 PROCEDURE — 70553 MRI BRAIN STEM W/O & W/DYE: CPT

## 2023-06-22 RX ADMIN — GADOTERIDOL 10 ML: 279.3 INJECTION, SOLUTION INTRAVENOUS at 11:13

## 2023-07-25 NOTE — DISCHARGE INSTRUCTIONS
Neurointerventional Surgery Discharge Instructions       PATIENT ID: Sampson Alston  MRN: [id number]   YOB: 1960    DATE OF ADMISSION: 6/15/2020  7:18 AM    DATE OF DISCHARGE: 6/16/2020    PRIMARY CARE PROVIDER: Ashley Cabello MD     ATTENDING PHYSICIAN: Stefano Cameron MD  DISCHARGING PROVIDER: Abdi Fierro NP    To contact this individual call [phone] and ask the  to page. If unavailable ask to be transferred the Adult Hospitalist Department. DISCHARGE DIAGNOSES Cerebral Aneurysm Repair     CONSULTATIONS: None    PROCEDURES/SURGERIES:       NEUROINTERVENTIONAL SURGERY POST-PROCEDURE NOTE     PROCEDURE:  Cerebral angiogram and stent-assisted coil embolization of left ICA aneurysm     VESSEL(S) STUDIED:  1. Left ICA  2. Left CCA VESSEL(S) TREATED:  1. Left ICA         PRELIMINARY REPORT & DISPOSITION:   Irregular left ICA aneurysm again noted. Status post stent-assisted coil embolization of left ICA aneurysm using a 4 x 21 mm Neuroform [place] stent and three detachable aneurysm coils, resulting in complete occlusion of the aneurysm sac. Good flow in left ICA and large left PCOM.     COMPLICATIONS:  Vasospasm or mild non-flow limiting dissection of cervical left ICA     FOLLOW-UP:  Admit to ICU  SBP   Continue DAPT  Check aspirin test and P2Y12 DATE OF SERVICE:  6/15/2020 12:12 PM      ATTENDING SURGEON(S):  MD Ted Gutierrez MD     ANESTHESIA:   GA     MEDICATIONS:   See nursing record     PUNCTURE SITE:  Left common femoral artery.   Arteriotomy closed with AngioSeal.  Flat with leg straight x 2 hours.                PENDING TEST RESULTS:   At the time of discharge the following test results are still pending: None    FOLLOW UP APPOINTMENTS:   Follow-up Information     Follow up With Specialties Details Why MD [facility] In 7 days As needed, If symptoms worsen [address] [place] [address]      Nu Aragon MD   On 6/30/2020 This will be a virtual appointment scheduled for 10 AM. Our  will call you 15 minutes prior to get you ready for the appointment. [facility], [address]            ADDITIONAL CARE RECOMMENDATIONS:    Patient to avoid tub baths, swimming or hot tubs for one week. May take showers and pat the site dry. Do NOT skip any doses of aspirin and plavix. Take these daily every day to prevent stroke related to stent placement. ACTIVITY: Do not lift anything over 10 lbs for 10-14 days. Walking and stairs permissible. May resume all physical activities as tolerated after cleared at follow up appointment with Dr. Kathy Schlatter. WOUND CARE: Monitor for signs and sx of infection including fever, expanding inflammation and discolored drainage. Report any changes in temperature in affected extremity, numbness, weakness or hematoma. If the groin puncture site starts bleeding, please apply HARD direct pressure over the incision site for 10-20 minutes. If it does not stop bleeding, please call 911 or return to the emergency room. DISCHARGE MEDICATIONS:   See Medication Reconciliation Form    · It is important that you take the medication exactly as they are prescribed. · Keep your medication in the bottles provided by the pharmacist and keep a list of the medication names, dosages, and times to be taken in your wallet. · Do not take other medications without consulting your doctor. NOTIFY YOUR PHYSICIAN FOR ANY OF THE FOLLOWING:   Fever over 101 degrees for 24 hours. Chest pain, shortness of breath, fever, chills, nausea, vomiting, diarrhea, change in mentation, falling, weakness, bleeding. Severe pain or pain not relieved by medications. Or, any other signs or symptoms that you may have questions about.       DISPOSITION:   X Home With: none   OT  PT  [facility]  RN SNF/Inpatient Rehab/LTAC    Independent/assisted living    Hospice    Other:     CDMP Checked: Yes X     PROBLEM LIST Updated:   Yes X       Signed:   Juan Michelle NP  6/16/2020  11:10 AM Hydroxyzine Pregnancy And Lactation Text: This medication is not safe during pregnancy and should not be taken. It is also excreted in breast milk and breast feeding isn't recommended.

## 2023-08-03 LAB — MAMMOGRAPHY, EXTERNAL: NORMAL

## 2023-11-10 DIAGNOSIS — E03.8 HYPOTHYROIDISM DUE TO HASHIMOTO'S THYROIDITIS: ICD-10-CM

## 2023-11-10 DIAGNOSIS — E06.3 HYPOTHYROIDISM DUE TO HASHIMOTO'S THYROIDITIS: ICD-10-CM

## 2023-11-10 RX ORDER — LEVOTHYROXINE SODIUM 0.07 MG/1
TABLET ORAL
Qty: 90 TABLET | Refills: 1 | Status: SHIPPED | OUTPATIENT
Start: 2023-11-10

## 2024-01-08 NOTE — PROGRESS NOTES
[facility]   Internal Medicine  [address], [address] [facility]  [address]  [place], [address] [facility]  [phone]      Primary Care Visit Note    Assessment/Plan:     Diagnoses and all orders for this visit:    1. Sensation of fullness in right ear  -     guaiFENesin ER (MUCINEX) 600 mg ER tablet; Take 1 Tablet by mouth two (2) times a day. -     REFERRAL TO ENT-OTOLARYNGOLOGY  - continue OTC zyrtec  - patient refused to take a covid test      Mariano Shaw MD    CC:     Chief Complaint   Patient presents with    Cough    Nasal Congestion    Ear Fullness     Can't hear out the right ear. When blowing her nose her ears popping. Itchy       HPI:     Lauri Anderson is a 57 y.o. female who presents for evaluation of ear fullness. Patient has been experiencing upper respiratory sx for the last 3 weeks. She feels like her ears have been plugged up since then. She went to  and got amoxicillin which did not improve sx, z-pack tried next again didn't help. She has also had a mild cough with production of clear sputum. No shortness of breath or fevers. She states that she has had covid in the past and this does not feel like covid and refuses to take a covid test.     ROS:   All 10 point review of systems negative except those mentioned in the HPI. Past Medical History:      Active Ambulatory Problems     Diagnosis Date Noted    Bronchitis 11/06/2015    Gallbladder polyp     Non-intractable vomiting with nausea 01/31/2018    Pulsatile tinnitus of left ear 08/20/2019    Cerebral aneurysm 06/02/2020    Cerebral aneurysm, nonruptured 06/15/2020     Resolved Ambulatory Problems     Diagnosis Date Noted    No Resolved Ambulatory Problems     Past Medical History:   Diagnosis Date    Aneurysm ([address] 06/15/2020    Anxiety     At risk for malignant hyperthermia     Hypothyroid     Neurological disorder           Current Medications:     Current Outpatient Medications: Patient: Joseph Isaac    Procedure Information       Date/Time: 01/08/24 0700    Scheduled providers: Jeremy Anthony DO; Sy Diaz MD    Procedure: COLONOSCOPY    Location: [facility] OR            Relevant Problems   Anesthesia (within normal limits)      Cardiovascular   (+) Atypical chest pain   (+) Coronary artery disease involving autologous artery coronary bypass graft without angina pectoris   (+) Essential hypertension      Endocrine (within normal limits)   (+) Morbid obesity with BMI of 45.0-49.9, adult (CMS/HCC)      GI   (+) Gastroesophageal reflux disease   (+) Malignant neoplasm of colon (CMS/HCC)      /Renal (within normal limits)      Neuro/Psych (within normal limits)      Pulmonary (within normal limits)      GI/Hepatic   (+) Malignant neoplasm of colon (CMS/HCC)      Hematology (within normal limits)      Musculoskeletal (within normal limits)      Eyes, Ears, Nose, and Throat (within normal limits)      Infectious Disease (within normal limits)      Other   (+) Arthritis   (+) Inflammatory arthropathy       Clinical information reviewed:    Allergies                NPO Detail:  NPO/Void Status  Carbohydrate Drink Given Prior to Surgery? : N  Date of Last Liquid: 01/08/24  Time of Last Liquid: 0200  Date of Last Solid: 01/07/24  Time of Last Solid: 0830  Last Intake Type: Clear fluids         Physical Exam    Airway  Mallampati: III  TM distance: >3 FB  Neck ROM: full     Cardiovascular   Rhythm: regular  Rate: normal     Dental    Pulmonary    Abdominal            Anesthesia Plan    ASA 3     MAC     The patient is not a current smoker.  Patient was not previously instructed to abstain from smoking on day of procedure.  Patient did not smoke on day of procedure.    intravenous induction   Postoperative administration of opioids is intended.  Anesthetic plan and risks discussed with patient.       levothyroxine (Synthroid) 75 mcg tablet, TAKE 1 TABLET ONCE DAILY   BEFORE BREAKFAST, Disp: 30 Tablet, Rfl: 0    ondansetron hcl (Zofran) 4 mg tablet, Take 1 Tablet by mouth every eight (8) hours as needed for Nausea., Disp: 20 Tablet, Rfl: 0    aspirin 81 mg chewable tablet, Take 2 Tablets by mouth daily.  Indications: Cerebral aneurysm, Disp: 60 Tablet, Rfl: 5      Past Surgical History:     Past Surgical History:   Procedure Laterality Date    HX GYN      HX OVARIAN CYST REMOVAL      HX OVARIAN CYST REMOVAL Right          Family History:     Family History   Problem Relation Age of Onset    Hypertension Mother     Dementia Father     Colon Cancer Father 80    No Known Problems Sister     No Known Problems Brother     No Known Problems Maternal Aunt     No Known Problems Maternal Uncle     No Known Problems Paternal Aunt     No Known Problems Paternal Uncle     Diabetes Maternal Grandmother     Cancer Maternal Grandfather     Cancer Paternal Grandmother     No Known Problems Paternal Grandfather          Social History:     Social History     Socioeconomic History    Marital status:      Spouse name: Not on file    Number of children: Not on file    Years of education: Not on file    Highest education level: Not on file   Occupational History    Not on file   Tobacco Use    Smoking status: Never    Smokeless tobacco: Never   Vaping Use    Vaping Use: Never used   Substance and Sexual Activity    Alcohol use: Not Currently     Comment: rarealy    Drug use: Never    Sexual activity: Yes     Partners: Male     Birth control/protection: Condom   Other Topics Concern    Not on file   Social History Narrative    ** Merged History Encounter **          Social Determinants of Health     Financial Resource Strain: Low Risk     Difficulty of Paying Living Expenses: Not hard at all   Food Insecurity: No Food Insecurity    Worried About Running Out of Food in the Last Year: Never true    Ran Out of Food in the Last Year: Never true   Transportation Needs: Not on file   Physical Activity: Not on file   Stress: Not on file   Social Connections: Not on file   Intimate Partner Violence: Not on file   Housing Stability: Not on file            Visit Vitals  /68   Pulse 62   Temp 97.2 °F (36.2 °C) (Temporal)   Resp 14   Ht 5' 2\" (1.575 m)   Wt 110 lb (49.9 kg)   SpO2 99%   BMI 20.12 kg/m²       Physical Exam:   General - Well appearing  HEENT - EOMI, non-icteric sclera. Bilateral external ear canals w/o erythema or swelling and only minimal cerumen. TM pale w/o abnormality. Pulm - clear to auscultation bilaterally  Cardio - RRR, normal S1 S2, no murmur  Abd - soft, nontender, nabs  Extrem - no edema  Neuro-  Alert and oriented, No focal deficits           Labs/Imaging:     Labs and imaging reviewed by me and significant for:    Lab Results   Component Value Date/Time    WBC 7.5 08/28/2022 09:19 PM    HGB 13.1 08/28/2022 09:19 PM    HCT 38.4 08/28/2022 09:19 PM    PLATELET 932 09/97/3151 09:19 PM    MCV 89.7 08/28/2022 09:19 PM     Lab Results   Component Value Date/Time    Sodium 138 08/28/2022 09:19 PM    Potassium 4.1 08/28/2022 09:19 PM    Chloride 106 08/28/2022 09:19 PM    CO2 24 08/28/2022 09:19 PM    Anion gap 8 08/28/2022 09:19 PM    Glucose 119 (H) 08/28/2022 09:19 PM    BUN 24 (H) 08/28/2022 09:19 PM    Creatinine 0.87 08/28/2022 09:19 PM    BUN/Creatinine ratio 28 (H) 08/28/2022 09:19 PM    GFR est AA >60 08/28/2022 09:19 PM    GFR est non-AA >60 08/28/2022 09:19 PM    Calcium 9.6 08/28/2022 09:19 PM    Bilirubin, total 0.4 08/28/2022 09:19 PM    Alk.  phosphatase 85 08/28/2022 09:19 PM    Protein, total 7.3 08/28/2022 09:19 PM    Albumin 3.6 08/28/2022 09:19 PM    Globulin 3.7 08/28/2022 09:19 PM    A-G Ratio 1.0 (L) 08/28/2022 09:19 PM    ALT (SGPT) 20 08/28/2022 09:19 PM    AST (SGOT) 20 08/28/2022 09:19 PM         Please note that this dictation was completed in part with [employer], the computer voice recognition software. Quite often unanticipated grammatical, syntax, homophones, and other interpretive errors are inadvertently transcribed by the computer software. Please disregard these errors. Please excuse any errors that have escaped final proofreading.

## 2024-03-19 ENCOUNTER — PATIENT MESSAGE (OUTPATIENT)
Age: 64
End: 2024-03-19

## 2024-03-19 ENCOUNTER — TELEPHONE (OUTPATIENT)
Age: 64
End: 2024-03-19

## 2024-03-19 DIAGNOSIS — Z12.11 SCREENING FOR COLON CANCER: Primary | ICD-10-CM

## 2024-03-19 NOTE — TELEPHONE ENCOUNTER
----- Message from Ritu Soliman sent at 3/19/2024 11:56 AM EDT -----  Subject: Referral Request    Reason for referral request? Order for colonoscopy  Provider patient wants to be referred to(if known):     Provider Phone Number(if known):    Additional Information for Provider? Please contact patient to let her   know when the order is in her chart. Please advise.  ---------------------------------------------------------------------------  --------------  CALL BACK INFO    [phone]; OK to leave message on voicemail  ---------------------------------------------------------------------------  --------------

## 2024-03-22 NOTE — TELEPHONE ENCOUNTER
From: SHARATH TAPIA  To: Katharine Temple  Sent: 3/19/2024 3:50 PM EDT  Subject: Order for colonoscopy    You had a colonoscopy 2019 with Dr. Kam and he wrote - follow up in 10 years . Are you having issues?

## 2024-05-20 ENCOUNTER — PATIENT MESSAGE (OUTPATIENT)
Age: 64
End: 2024-05-20

## 2024-05-20 DIAGNOSIS — K62.5 RECTAL BLEEDING: Primary | ICD-10-CM

## 2024-05-20 NOTE — TELEPHONE ENCOUNTER
From: Katharine Temple  To: Dr. Ely Rojas  Sent: 5/20/2024 9:28 AM EDT  Subject: colonoscopy    I have a colonoscopy scheduled for Aug. 14, but am having frequent rectal bleeding and was hoping Dr. Villatoro could help me get that appointment moved up. I contacted them a few weeks ago to get it moved up because of summer scheduling and they said they don't have earlier openings. I've had infrequent bleeding in the past year, but it has been more frequent lately.   I was hoping a doctors orders would help me get in earlier.   I tried calling them again and can't get through. I can try one more time, but I don't think the  will be able to do much about it.  These are my available dates:   May 28 and 29, June 5-7, 10-13, 28.

## 2024-05-21 ENCOUNTER — NURSE ONLY (OUTPATIENT)
Age: 64
End: 2024-05-21

## 2024-05-21 DIAGNOSIS — K62.5 RECTAL BLEEDING: ICD-10-CM

## 2024-05-21 LAB
ERYTHROCYTE [DISTWIDTH] IN BLOOD BY AUTOMATED COUNT: 13.4 % (ref 11.5–14.5)
HCT VFR BLD AUTO: 37.8 % (ref 35–47)
HGB BLD-MCNC: 12.4 G/DL (ref 11.5–16)
MCH RBC QN AUTO: 30.5 PG (ref 26–34)
MCHC RBC AUTO-ENTMCNC: 32.8 G/DL (ref 30–36.5)
MCV RBC AUTO: 93.1 FL (ref 80–99)
NRBC # BLD: 0 K/UL (ref 0–0.01)
NRBC BLD-RTO: 0 PER 100 WBC
PLATELET # BLD AUTO: 337 K/UL (ref 150–400)
PMV BLD AUTO: 9.7 FL (ref 8.9–12.9)
RBC # BLD AUTO: 4.06 M/UL (ref 3.8–5.2)
WBC # BLD AUTO: 4.4 K/UL (ref 3.6–11)

## 2024-06-14 SDOH — HEALTH STABILITY: PHYSICAL HEALTH: ON AVERAGE, HOW MANY MINUTES DO YOU ENGAGE IN EXERCISE AT THIS LEVEL?: 60 MIN

## 2024-06-14 SDOH — ECONOMIC STABILITY: INCOME INSECURITY: HOW HARD IS IT FOR YOU TO PAY FOR THE VERY BASICS LIKE FOOD, HOUSING, MEDICAL CARE, AND HEATING?: NOT HARD AT ALL

## 2024-06-14 SDOH — ECONOMIC STABILITY: FOOD INSECURITY: WITHIN THE PAST 12 MONTHS, THE FOOD YOU BOUGHT JUST DIDN'T LAST AND YOU DIDN'T HAVE MONEY TO GET MORE.: NEVER TRUE

## 2024-06-14 SDOH — ECONOMIC STABILITY: FOOD INSECURITY: WITHIN THE PAST 12 MONTHS, YOU WORRIED THAT YOUR FOOD WOULD RUN OUT BEFORE YOU GOT MONEY TO BUY MORE.: NEVER TRUE

## 2024-06-14 SDOH — ECONOMIC STABILITY: TRANSPORTATION INSECURITY
IN THE PAST 12 MONTHS, HAS LACK OF TRANSPORTATION KEPT YOU FROM MEETINGS, WORK, OR FROM GETTING THINGS NEEDED FOR DAILY LIVING?: NO

## 2024-06-14 SDOH — HEALTH STABILITY: PHYSICAL HEALTH: ON AVERAGE, HOW MANY DAYS PER WEEK DO YOU ENGAGE IN MODERATE TO STRENUOUS EXERCISE (LIKE A BRISK WALK)?: 3 DAYS

## 2024-06-14 ASSESSMENT — LIFESTYLE VARIABLES
HOW OFTEN DO YOU HAVE A DRINK CONTAINING ALCOHOL: 2
HOW MANY STANDARD DRINKS CONTAINING ALCOHOL DO YOU HAVE ON A TYPICAL DAY: 1 OR 2
HOW OFTEN DO YOU HAVE SIX OR MORE DRINKS ON ONE OCCASION: 1
HOW OFTEN DO YOU HAVE A DRINK CONTAINING ALCOHOL: MONTHLY OR LESS
HOW MANY STANDARD DRINKS CONTAINING ALCOHOL DO YOU HAVE ON A TYPICAL DAY: 1

## 2024-06-14 ASSESSMENT — PATIENT HEALTH QUESTIONNAIRE - PHQ9
SUM OF ALL RESPONSES TO PHQ QUESTIONS 1-9: 0
2. FEELING DOWN, DEPRESSED OR HOPELESS: NOT AT ALL
SUM OF ALL RESPONSES TO PHQ QUESTIONS 1-9: 0
SUM OF ALL RESPONSES TO PHQ9 QUESTIONS 1 & 2: 0
1. LITTLE INTEREST OR PLEASURE IN DOING THINGS: NOT AT ALL
SUM OF ALL RESPONSES TO PHQ QUESTIONS 1-9: 0
SUM OF ALL RESPONSES TO PHQ QUESTIONS 1-9: 0

## 2024-06-17 ENCOUNTER — OFFICE VISIT (OUTPATIENT)
Age: 64
End: 2024-06-17
Payer: MEDICARE

## 2024-06-17 VITALS
HEIGHT: 62 IN | DIASTOLIC BLOOD PRESSURE: 70 MMHG | OXYGEN SATURATION: 98 % | TEMPERATURE: 97.8 F | WEIGHT: 112.6 LBS | RESPIRATION RATE: 18 BRPM | HEART RATE: 72 BPM | SYSTOLIC BLOOD PRESSURE: 112 MMHG | BODY MASS INDEX: 20.72 KG/M2

## 2024-06-17 DIAGNOSIS — E55.9 VITAMIN D DEFICIENCY: ICD-10-CM

## 2024-06-17 DIAGNOSIS — E78.00 HIGH CHOLESTEROL: ICD-10-CM

## 2024-06-17 DIAGNOSIS — Z79.82 ON ASPIRIN AT HOME: ICD-10-CM

## 2024-06-17 DIAGNOSIS — K62.5 RECTAL BLEEDING: ICD-10-CM

## 2024-06-17 DIAGNOSIS — E06.3 HYPOTHYROIDISM DUE TO HASHIMOTO'S THYROIDITIS: ICD-10-CM

## 2024-06-17 DIAGNOSIS — E03.8 HYPOTHYROIDISM DUE TO HASHIMOTO'S THYROIDITIS: ICD-10-CM

## 2024-06-17 DIAGNOSIS — Z00.00 MEDICARE ANNUAL WELLNESS VISIT, SUBSEQUENT: Primary | ICD-10-CM

## 2024-06-17 PROCEDURE — G0439 PPPS, SUBSEQ VISIT: HCPCS | Performed by: INTERNAL MEDICINE

## 2024-06-17 PROCEDURE — 3017F COLORECTAL CA SCREEN DOC REV: CPT | Performed by: INTERNAL MEDICINE

## 2024-06-17 PROCEDURE — 1123F ACP DISCUSS/DSCN MKR DOCD: CPT | Performed by: INTERNAL MEDICINE

## 2024-06-17 RX ORDER — FAMOTIDINE 20 MG/1
20 TABLET, FILM COATED ORAL 2 TIMES DAILY
Qty: 60 TABLET | Refills: 3 | Status: SHIPPED | OUTPATIENT
Start: 2024-06-17

## 2024-06-17 ASSESSMENT — PATIENT HEALTH QUESTIONNAIRE - PHQ9
SUM OF ALL RESPONSES TO PHQ QUESTIONS 1-9: 0
SUM OF ALL RESPONSES TO PHQ9 QUESTIONS 1 & 2: 0
1. LITTLE INTEREST OR PLEASURE IN DOING THINGS: NOT AT ALL
SUM OF ALL RESPONSES TO PHQ QUESTIONS 1-9: 0
SUM OF ALL RESPONSES TO PHQ QUESTIONS 1-9: 0
2. FEELING DOWN, DEPRESSED OR HOPELESS: NOT AT ALL
SUM OF ALL RESPONSES TO PHQ QUESTIONS 1-9: 0

## 2024-06-17 NOTE — PROGRESS NOTES
Medicare Annual Wellness Visit    Katharine Temple is here for Medicare AWV    Assessment & Plan   Medicare annual wellness visit, subsequent  Rectal bleeding  -     famotidine (PEPCID) 20 MG tablet; Take 1 tablet by mouth 2 times daily, Disp-60 tablet, R-3Normal  -     CBC; Future  -     Ferritin; Future  -     Iron and TIBC; Future  Hypothyroidism due to Hashimoto's thyroiditis  -     TSH; Future  -     T4, Free; Future  On aspirin at home  -     famotidine (PEPCID) 20 MG tablet; Take 1 tablet by mouth 2 times daily, Disp-60 tablet, R-3Normal  High cholesterol  -     Lipid Panel; Future  Vitamin D deficiency  -     Vitamin D 25 Hydroxy; Future    Assessment & Plan  1. Resolved rectal bleeding.  The patient will be initiated on Pepcid as a precautionary measure against potential gastric issues due to her concurrent use of aspirin. It is imperative that she continues her aspirin regimen due to her history of brain aneurysm. Should she observe any bleeding or exacerbation of pain in the interim, she will let me know  She has colonoscopy scheduled and advised to call GI and let them know upper endoscopy also advised    2. Hypothyroidism.  A thyroid panel will be conducted to assess her thyroid levels. She is currently on a daily regimen of 75 mcg of levothyroxine.    3. Vitamin D deficiency.  Her vitamin D levels were within the normal range in 04/2023. A vitamin D level test will be conducted to assess her vitamin D levels.    4. Osteopenia.  Her bone mineral density test will be repeated. Additionally, her iron levels, kidney function, liver function, and electrolyte levels will be checked.     Recommendations for Preventive Services Due: see orders and patient instructions/AVS.  Recommended screening schedule for the next 5-10 years is provided to the patient in written form: see Patient Instructions/AVS.     Return in 6 months (on 12/17/2024).     Subjective     History of Present Illness  The patient is a  Pt scheduled for follow up in 6 mo.  Collected Blood sugar readings for 2 weeks.  Transferred to lab.      Blood Sugar Meter Readings:  11/14 5:30 pm 240   4:09 am 264  11/12 2:05 am 115  11/11 4:30 am 222  11/10 11pm 282   11/9  6:25 pm 281            6:49 am 249            6:30 pm 281  11/8 6:00 am 262  11/7 4:30 am 295  11/6  1:00 am 318  11/4   6:00 pm 295            3:57 am 261            5:52 pm 298  11/3    2:42pm 279              2:55 am 266  11/2    4:55 am 113  11/1 7:36 pm 414  10/27 3:14am 349  10/26 4:01 am 145

## 2024-06-17 NOTE — PATIENT INSTRUCTIONS
to swim, bike, or do other activities. Bit by bit, increase the time you're active every day. Try for at least 30 minutes on most days of the week.     Try to quit or cut back on using tobacco and other nicotine products. This includes smoking and vaping. If you need help quitting, talk to your doctor about stop-smoking programs and medicines. These can increase your chances of quitting for good. Quitting is one of the most important things you can do to protect your heart. It is never too late to quit. Try to avoid secondhand smoke too.     Stay at a weight that's healthy for you. Talk to your doctor if you need help losing weight.     Try to get 7 to 9 hours of sleep each night.     Limit alcohol to 2 drinks a day for men and 1 drink a day for women. Too much alcohol can cause health problems.     Manage other health problems such as diabetes, high blood pressure, and high cholesterol. If you think you may have a problem with alcohol or drug use, talk to your doctor.   Medicines    Take your medicines exactly as prescribed. Call your doctor if you think you are having a problem with your medicine.     If your doctor recommends aspirin, take the amount directed each day. Make sure you take aspirin and not another kind of pain reliever, such as acetaminophen (Tylenol).   When should you call for help?   Call 911 if you have symptoms of a heart attack. These may include:    Chest pain or pressure, or a strange feeling in the chest.     Sweating.     Shortness of breath.     Pain, pressure, or a strange feeling in the back, neck, jaw, or upper belly or in one or both shoulders or arms.     Lightheadedness or sudden weakness.     A fast or irregular heartbeat.   After you call 911, the  may tell you to chew 1 adult-strength or 2 to 4 low-dose aspirin. Wait for an ambulance. Do not try to drive yourself.  Watch closely for changes in your health, and be sure to contact your doctor if you have any

## 2024-06-18 LAB
25(OH)D3 SERPL-MCNC: 42.2 NG/ML (ref 30–100)
CHOLEST SERPL-MCNC: 240 MG/DL
ERYTHROCYTE [DISTWIDTH] IN BLOOD BY AUTOMATED COUNT: 13.3 % (ref 11.5–14.5)
FERRITIN SERPL-MCNC: 8 NG/ML (ref 8–252)
HCT VFR BLD AUTO: 41.9 % (ref 35–47)
HDLC SERPL-MCNC: 94 MG/DL
HDLC SERPL: 2.6 (ref 0–5)
HGB BLD-MCNC: 13.1 G/DL (ref 11.5–16)
IRON SATN MFR SERPL: 18 % (ref 20–50)
IRON SERPL-MCNC: 68 UG/DL (ref 35–150)
LDLC SERPL CALC-MCNC: 132.4 MG/DL (ref 0–100)
MCH RBC QN AUTO: 30 PG (ref 26–34)
MCHC RBC AUTO-ENTMCNC: 31.3 G/DL (ref 30–36.5)
MCV RBC AUTO: 95.9 FL (ref 80–99)
NRBC # BLD: 0 K/UL (ref 0–0.01)
NRBC BLD-RTO: 0 PER 100 WBC
PLATELET # BLD AUTO: 328 K/UL (ref 150–400)
PMV BLD AUTO: 10.3 FL (ref 8.9–12.9)
RBC # BLD AUTO: 4.37 M/UL (ref 3.8–5.2)
T4 FREE SERPL-MCNC: 1.1 NG/DL (ref 0.8–1.5)
TIBC SERPL-MCNC: 380 UG/DL (ref 250–450)
TRIGL SERPL-MCNC: 68 MG/DL
TSH SERPL DL<=0.05 MIU/L-ACNC: 0.79 UIU/ML (ref 0.36–3.74)
VLDLC SERPL CALC-MCNC: 13.6 MG/DL
WBC # BLD AUTO: 4.3 K/UL (ref 3.6–11)

## 2024-07-30 DIAGNOSIS — E03.8 HYPOTHYROIDISM DUE TO HASHIMOTO'S THYROIDITIS: ICD-10-CM

## 2024-07-30 DIAGNOSIS — E06.3 HYPOTHYROIDISM DUE TO HASHIMOTO'S THYROIDITIS: ICD-10-CM

## 2024-07-30 RX ORDER — LEVOTHYROXINE SODIUM 0.07 MG/1
75 TABLET ORAL DAILY
Qty: 90 TABLET | Refills: 1 | Status: SHIPPED | OUTPATIENT
Start: 2024-07-30

## 2024-07-30 NOTE — TELEPHONE ENCOUNTER
PCP: Ely Miles MD    Last appt:   6/17/2024    No future appointments.    Requested Prescriptions     Pending Prescriptions Disp Refills    levothyroxine (SYNTHROID) 75 MCG tablet 90 tablet 1

## 2024-07-30 NOTE — TELEPHONE ENCOUNTER
----- Message from Katharine Temple sent at 7/30/2024  9:18 AM EDT -----  Regarding: Need refill  Contact: [phone]  I am needing a refill on my Levothyroxine 75mcg.  Could you please send it to [facility] on [address]  Thank you.

## 2024-08-14 ENCOUNTER — ANESTHESIA EVENT (OUTPATIENT)
Facility: HOSPITAL | Age: 64
End: 2024-08-14
Payer: MEDICARE

## 2024-08-14 ENCOUNTER — ANESTHESIA (OUTPATIENT)
Facility: HOSPITAL | Age: 64
End: 2024-08-14
Payer: MEDICARE

## 2024-08-14 ENCOUNTER — HOSPITAL ENCOUNTER (OUTPATIENT)
Facility: HOSPITAL | Age: 64
Setting detail: OUTPATIENT SURGERY
Discharge: HOME | End: 2024-08-14
Attending: STUDENT IN AN ORGANIZED HEALTH CARE EDUCATION/TRAINING PROGRAM | Admitting: STUDENT IN AN ORGANIZED HEALTH CARE EDUCATION/TRAINING PROGRAM
Payer: MEDICARE

## 2024-08-14 VITALS
RESPIRATION RATE: 16 BRPM | WEIGHT: 109.8 LBS | HEART RATE: 53 BPM | HEIGHT: 62 IN | TEMPERATURE: 98.2 F | BODY MASS INDEX: 20.2 KG/M2 | SYSTOLIC BLOOD PRESSURE: 135 MMHG | OXYGEN SATURATION: 100 % | DIASTOLIC BLOOD PRESSURE: 49 MMHG

## 2024-08-14 PROCEDURE — 3700000001 HC ADD 15 MINUTES (ANESTHESIA): Performed by: STUDENT IN AN ORGANIZED HEALTH CARE EDUCATION/TRAINING PROGRAM

## 2024-08-14 PROCEDURE — 2580000003 HC RX 258: Performed by: STUDENT IN AN ORGANIZED HEALTH CARE EDUCATION/TRAINING PROGRAM

## 2024-08-14 PROCEDURE — 3600007512: Performed by: STUDENT IN AN ORGANIZED HEALTH CARE EDUCATION/TRAINING PROGRAM

## 2024-08-14 PROCEDURE — 6360000002 HC RX W HCPCS: Performed by: NURSE ANESTHETIST, CERTIFIED REGISTERED

## 2024-08-14 PROCEDURE — 3700000000 HC ANESTHESIA ATTENDED CARE: Performed by: STUDENT IN AN ORGANIZED HEALTH CARE EDUCATION/TRAINING PROGRAM

## 2024-08-14 PROCEDURE — 2500000003 HC RX 250 WO HCPCS: Performed by: NURSE ANESTHETIST, CERTIFIED REGISTERED

## 2024-08-14 PROCEDURE — 7100000010 HC PHASE II RECOVERY - FIRST 15 MIN: Performed by: STUDENT IN AN ORGANIZED HEALTH CARE EDUCATION/TRAINING PROGRAM

## 2024-08-14 PROCEDURE — 2709999900 HC NON-CHARGEABLE SUPPLY: Performed by: STUDENT IN AN ORGANIZED HEALTH CARE EDUCATION/TRAINING PROGRAM

## 2024-08-14 PROCEDURE — 3600007502: Performed by: STUDENT IN AN ORGANIZED HEALTH CARE EDUCATION/TRAINING PROGRAM

## 2024-08-14 PROCEDURE — 7100000011 HC PHASE II RECOVERY - ADDTL 15 MIN: Performed by: STUDENT IN AN ORGANIZED HEALTH CARE EDUCATION/TRAINING PROGRAM

## 2024-08-14 RX ORDER — SODIUM CHLORIDE 9 MG/ML
25 INJECTION, SOLUTION INTRAVENOUS PRN
Status: DISCONTINUED | OUTPATIENT
Start: 2024-08-14 | End: 2024-08-14 | Stop reason: HOSPADM

## 2024-08-14 RX ORDER — SODIUM CHLORIDE 0.9 % (FLUSH) 0.9 %
5-40 SYRINGE (ML) INJECTION PRN
Status: DISCONTINUED | OUTPATIENT
Start: 2024-08-14 | End: 2024-08-14 | Stop reason: HOSPADM

## 2024-08-14 RX ORDER — SODIUM CHLORIDE 0.9 % (FLUSH) 0.9 %
5-40 SYRINGE (ML) INJECTION EVERY 12 HOURS SCHEDULED
Status: DISCONTINUED | OUTPATIENT
Start: 2024-08-14 | End: 2024-08-14 | Stop reason: HOSPADM

## 2024-08-14 RX ADMIN — PROPOFOL 30 MG: 10 INJECTION, EMULSION INTRAVENOUS at 09:34

## 2024-08-14 RX ADMIN — PROPOFOL 50 MG: 10 INJECTION, EMULSION INTRAVENOUS at 09:26

## 2024-08-14 RX ADMIN — SODIUM CHLORIDE: 9 INJECTION, SOLUTION INTRAVENOUS at 09:17

## 2024-08-14 RX ADMIN — LIDOCAINE HYDROCHLORIDE 100 MG: 20 INJECTION, SOLUTION EPIDURAL; INFILTRATION; INTRACAUDAL; PERINEURAL at 09:23

## 2024-08-14 RX ADMIN — PROPOFOL 100 MG: 10 INJECTION, EMULSION INTRAVENOUS at 09:23

## 2024-08-14 RX ADMIN — SODIUM CHLORIDE 25 ML: 9 INJECTION, SOLUTION INTRAVENOUS at 09:14

## 2024-08-14 ASSESSMENT — PAIN SCALES - GENERAL: PAINLEVEL_OUTOF10: 1

## 2024-08-14 ASSESSMENT — PAIN - FUNCTIONAL ASSESSMENT: PAIN_FUNCTIONAL_ASSESSMENT: NONE - DENIES PAIN

## 2024-08-14 NOTE — PROGRESS NOTES
Endoscopy Case End Note:    0941:  Procedure scope was pre-cleaned, per protocol, at bedside by Radha Licona.      0946:  Report received from anesthesia - Sonia Phillip CRNA.  See anesthesia flowsheet for intra-procedure vital signs and events.

## 2024-08-14 NOTE — ANESTHESIA PRE PROCEDURE
Department of Anesthesiology  Preprocedure Note       Name:  Katharine Temple   Age:  64 y.o.  :  1960                                          MRN:  [id number]         Date:  2024      Surgeon: Surgeon(s):  Glenda Lopes MD    Procedure: Procedure(s):  COLONOSCOPY BIOPSY    Medications prior to admission:   Prior to Admission medications    Medication Sig Start Date End Date Taking? Authorizing Provider   levothyroxine (SYNTHROID) 75 MCG tablet Take 1 tablet by mouth Daily 24  Yes Ely Miles MD   famotidine (PEPCID) 20 MG tablet Take 1 tablet by mouth 2 times daily 24  Yes Ely Miles MD   aspirin 81 MG chewable tablet Take 2 tablets by mouth daily 22  Yes Automatic Reconciliation, Ar       Current medications:    No current facility-administered medications for this encounter.     Current Outpatient Medications   Medication Sig Dispense Refill   • levothyroxine (SYNTHROID) 75 MCG tablet Take 1 tablet by mouth Daily 90 tablet 1   • famotidine (PEPCID) 20 MG tablet Take 1 tablet by mouth 2 times daily 60 tablet 3   • aspirin 81 MG chewable tablet Take 2 tablets by mouth daily         Allergies:    Allergies   Allergen Reactions   • Acyclovir Nausea And Vomiting     Other reaction(s): Vomiting  Other reaction(s): GI intolerance, Vomiting  Other reaction(s): Vomiting  Other reaction(s): Vomiting     • Pseudoephedrine Palpitations and Other (See Comments)     Other reaction(s): Unknown (comments)  Other reaction(s): Unknown (comments)  Other reaction(s): Unknown (comments)  Other reaction(s): Tachycardia   • Pseudoephedrine Hcl      Other reaction(s): Unknown (comments)   • Estriol      Other reaction(s): Unknown   • Morphine Nausea And Vomiting       Problem List:    Patient Active Problem List   Diagnosis Code   • Cerebral aneurysm, nonruptured I67.1   • Bronchitis J40   • Gallbladder polyp K82.4   • Cerebral aneurysm I67.1   • Pulsatile tinnitus of left ear H93.A2   •

## 2024-08-14 NOTE — H&P
Gastroenterology History and Physical    Patient: Katharine Temple    Physician: Glenda Lopes MD    Vital Signs: Blood pressure 129/64, pulse 58, resp. rate 14, height 1.575 m (5' 2\"), weight 49.8 kg (109 lb 12.8 oz), SpO2 100%.    Allergies:   Allergies   Allergen Reactions    Acyclovir Nausea And Vomiting     Other reaction(s): Vomiting  Other reaction(s): GI intolerance, Vomiting  Other reaction(s): Vomiting  Other reaction(s): Vomiting      Pseudoephedrine Palpitations and Other (See Comments)     Other reaction(s): Unknown (comments)  Other reaction(s): Unknown (comments)  Other reaction(s): Unknown (comments)  Other reaction(s): Tachycardia    Pseudoephedrine Hcl      Other reaction(s): Unknown (comments)    Estriol      Other reaction(s): Unknown    Morphine Nausea And Vomiting       Procedure: colonoscopy    Indication: screening, FHX of CRC    History:  Past Medical History:   Diagnosis Date    Aneurysm (HCC) 06/15/2020    Coiled and stented    Anxiety     At risk for malignant hyperthermia     MH PRECAUTIONS    Gallbladder polyp     Hearing loss     Hypothyroid     Neurological disorder     Ringing in ears       Past Surgical History:   Procedure Laterality Date    GYN      OVARIAN CYST REMOVAL      OVARIAN CYST REMOVAL Right       Social History     Socioeconomic History    Marital status:      Spouse name: None    Number of children: None    Years of education: None    Highest education level: None   Tobacco Use    Smoking status: Never    Smokeless tobacco: Never   Vaping Use    Vaping status: Never Used   Substance and Sexual Activity    Alcohol use: Yes     Comment: 1-2 monthly    Drug use: Never    Sexual activity: Yes     Birth control/protection: Condom   Social History Narrative    ** Merged History Encounter **          Social Determinants of Health     Financial Resource Strain: Low Risk  (5/16/2023)    Overall Financial Resource Strain (CARDIA)     Difficulty of Paying Living

## 2024-08-14 NOTE — PROGRESS NOTES
ARRIVAL INFORMATION:  Verified patient name and date of birth, scheduled procedure, and informed consent.     : Antony, , contact number: [phone]  Physician and staff can share information with the .     Belongings with patient include:  Clothing,Jewelry, x1 necklace, x2 rings    GI FOCUSED ASSESSMENT:  Neuro: Awake, alert, oriented x4  Respiratory: even and unlabored   GI: soft and non-distended  EKG Rhythm: normal sinus rhythm    Education:Reviewed general discharge instructions and  information.

## 2024-08-14 NOTE — ANESTHESIA POSTPROCEDURE EVALUATION
Department of Anesthesiology  Postprocedure Note    Patient: Katharine Temple  MRN: [id number]  YOB: 1960  Date of evaluation: 8/14/2024    Procedure Summary       Date: 08/14/24 Room / Location: [facility] ENDO 03 / [facility] ENDOSCOPY    Anesthesia Start: 0917 Anesthesia Stop: 0948    Procedure: COLONOSCOPY BIOPSY (Lower GI Region) Diagnosis: Family history of colon cancer    Surgeons: Glenda Lopes MD Responsible Provider: Devonte Guerrero MD    Anesthesia Type: MAC, TIVA ASA Status: 2            Anesthesia Type: MAC, TIVA    Bret Phase I: Bret Score: 10    Bret Phase II: Bret Score: 10    Anesthesia Post Evaluation    Patient location during evaluation: bedside  Patient participation: complete - patient participated  Level of consciousness: awake  Pain score: 0  Airway patency: patent  Nausea & Vomiting: no nausea and no vomiting  Cardiovascular status: hemodynamically stable  Respiratory status: acceptable  Hydration status: euvolemic  Pain management: adequate    No notable events documented.

## 2024-08-14 NOTE — DISCHARGE INSTRUCTIONS
Katharine Temple  [id number]  1960    COLONOSCOPY DISCHARGE INSTRUCTIONS  Discomfort:  Redness at IV site- apply warm compress to area; if redness or soreness persist- contact your physician  There may be a slight amount of blood passed from the rectum  Gaseous discomfort- walking, belching will help relieve any discomfort  You may not operate a vehicle for 12 hours  You may not engage in an occupation involving machinery or appliances for rest of today  You may not drink alcoholic beverages for at least 12 hours  Avoid making any critical decisions for at least 24 hour  DIET:   High fiber diet.   - however -  remember your colon is empty and a heavy meal will produce gas.   Avoid these foods:  vegetables, fried / greasy foods, carbonated drinks for today    BLOOD-THINNERS:     MEDICATION:  (See attached)     ACTIVITY:  You may not resume your normal daily activities until tomorrow AM; it is recommended that you spend the remainder of the day resting -  avoid any strenuous activity.    CALL M.D. WITH ANY SIGN OF:   Increasing pain, nausea, vomiting  Abdominal distension (swelling)  New increased bleeding (oral or rectal)  Fever (chills)  Pain in chest area  Bloody discharge from nose or mouth  Shortness of breath    You may not  take any Advil, Aspirin, Ibuprofen, Motrin, Aleve, or Goody’s for 10 days, ONLY  Tylenol as needed for pain.    IMPRESSION:    Impression:    Mild diverticulosis; grade II internal hemorrhoids.  Otherwise normal colonoscopy       Follow-up Instructions:  Results of any biopsies (if taken) will typically be available in about 1 week.  We will attempt to notify you of any biopsy results.  Please call Dr. Lopes for results in 7-10 days if we have not notified you sooner.  Please call Dr. Lopes with other questions about the results of your procedure  Telephone #[phone]  Repeat colonoscopy in 5 years    Glenda Lopes MD    Patient Education on Sedation / Analgesia

## 2024-08-14 NOTE — OP NOTE
[facility]                  Colonoscopy Operative Report    8/14/2024      Katharine Temple  [id number]  1960    Procedure Type:   Colonoscopy --screening     Indications:    Family history of coloretal cancer (screening only)     Pre-operative Diagnosis: see indication above    Post-operative Diagnosis:  See findings below    :  Glenda Lopes MD    Referring Provider: Ely Miles MD      Sedation:  MAC anesthesia Propofol    Pre-Procedural Exam:      Airway: clear,  No airway problems anticipated  Heart: RRR, without gallops or rubs  Lungs: clear bilaterally without wheezes, crackles, or rhonchi  Abdomen: soft, nontender, nondistended, bowel sounds present  Mental Status: awake, alert and oriented to person, place and time     Procedure Details:  After informed consent was obtained with all risks and benefits of procedure explained and preoperative exam completed, the patient was taken to the endoscopy suite and placed in the left lateral decubitus position.  Upon sequential sedation as per above, a digital rectal exam was performed .  The Olympus videocolonoscope  was inserted in the rectum and carefully advanced to the terminal ileum.  The cecum was identified by the ileocecal valve and appendiceal orifice.  The quality of preparation was adequate BBPS =9.  The colonoscope was slowly withdrawn with careful evaluation between folds. Retroflexion in the rectum was completed demonstrating internal hemorrhoids.     Findings:   Rectum: grade II internal hemorrhoids, otherwise no abnormalities  Sigmoid: mild diverticulosis;  Descending Colon: normal  Transverse Colon: normal  Ascending Colon: normal  Cecum: normal  Terminal Ileum: normal      Specimen Removed:  none    Complications: None.     EBL:  None.    Impression:     Mild diverticulosis; grade II internal hemorrhoids.  Otherwise normal colonoscopy     Recommendations: --Repeat colonoscopy in 5 years.

## 2025-02-02 DIAGNOSIS — E06.3 HYPOTHYROIDISM DUE TO HASHIMOTO'S THYROIDITIS: ICD-10-CM

## 2025-02-03 RX ORDER — LEVOTHYROXINE SODIUM 75 UG/1
75 TABLET ORAL DAILY
Qty: 90 TABLET | Refills: 1 | Status: SHIPPED | OUTPATIENT
Start: 2025-02-03

## 2025-03-28 ENCOUNTER — TELEPHONE (OUTPATIENT)
Age: 65
End: 2025-03-28

## 2025-03-28 DIAGNOSIS — I67.1 CEREBRAL ANEURYSM: Primary | ICD-10-CM

## 2025-03-28 NOTE — TELEPHONE ENCOUNTER
1 st attempt:  I reached out to the pt and I remind the pt to have her imaging done. I left the number to central scheduling on her my chart, 3/28/2025.

## 2025-04-09 ENCOUNTER — TELEPHONE (OUTPATIENT)
Age: 65
End: 2025-04-09

## 2025-04-09 NOTE — TELEPHONE ENCOUNTER
Called pt to give reminder about imaging. Pt is currently on vacation and will schedule upon returning. The information has been sent to her mychart.

## 2025-04-23 ENCOUNTER — TELEPHONE (OUTPATIENT)
Age: 65
End: 2025-04-23

## 2025-04-25 ENCOUNTER — HOSPITAL ENCOUNTER (OUTPATIENT)
Facility: HOSPITAL | Age: 65
Discharge: HOME | End: 2025-04-28
Attending: RADIOLOGY
Payer: MEDICARE

## 2025-04-25 DIAGNOSIS — I67.1 CEREBRAL ANEURYSM: ICD-10-CM

## 2025-04-25 PROCEDURE — 70544 MR ANGIOGRAPHY HEAD W/O DYE: CPT

## 2025-05-27 ENCOUNTER — TELEPHONE (OUTPATIENT)
Age: 65
End: 2025-05-27

## 2025-05-27 NOTE — TELEPHONE ENCOUNTER
Spoke with patient regarding follow up visit with provider. Provider is okay with doing a virtual phone call visit. Patient declined availability for days offered to her (6/19 and 7/3). Patient will call back to schedule follow up at her convenience.

## 2025-06-05 ENCOUNTER — TELEPHONE (OUTPATIENT)
Age: 65
End: 2025-06-05

## 2025-06-05 NOTE — TELEPHONE ENCOUNTER
I returned a vm for pt who had left us a  vm about her requesting a f/u appt with Dr Guadalupe and I left a call back number. She can not have a vv appt because she has not been seen in clinic in over 2 years so she has to come into the office.6/5/2025.

## 2025-06-23 ENCOUNTER — RESULTS FOLLOW-UP (OUTPATIENT)
Age: 65
End: 2025-06-23

## 2025-06-23 ENCOUNTER — OFFICE VISIT (OUTPATIENT)
Age: 65
End: 2025-06-23
Payer: MEDICARE

## 2025-06-23 VITALS
TEMPERATURE: 97.8 F | HEART RATE: 55 BPM | BODY MASS INDEX: 21.46 KG/M2 | WEIGHT: 116.6 LBS | RESPIRATION RATE: 18 BRPM | SYSTOLIC BLOOD PRESSURE: 100 MMHG | OXYGEN SATURATION: 98 % | HEIGHT: 62 IN | DIASTOLIC BLOOD PRESSURE: 62 MMHG

## 2025-06-23 DIAGNOSIS — E78.00 HIGH CHOLESTEROL: ICD-10-CM

## 2025-06-23 DIAGNOSIS — E06.3 HYPOTHYROIDISM DUE TO HASHIMOTO'S THYROIDITIS: ICD-10-CM

## 2025-06-23 DIAGNOSIS — Z00.00 MEDICARE ANNUAL WELLNESS VISIT, SUBSEQUENT: Primary | ICD-10-CM

## 2025-06-23 DIAGNOSIS — E55.9 VITAMIN D DEFICIENCY: ICD-10-CM

## 2025-06-23 DIAGNOSIS — D50.0 IRON DEFICIENCY ANEMIA DUE TO CHRONIC BLOOD LOSS: ICD-10-CM

## 2025-06-23 LAB
25(OH)D3 SERPL-MCNC: 51.4 NG/ML (ref 30–100)
ALBUMIN SERPL-MCNC: 3.8 G/DL (ref 3.5–5)
ALBUMIN/GLOB SERPL: 1.2 (ref 1.1–2.2)
ALP SERPL-CCNC: 83 U/L (ref 45–117)
ALT SERPL-CCNC: 20 U/L (ref 12–78)
ANION GAP SERPL CALC-SCNC: 2 MMOL/L (ref 2–12)
AST SERPL-CCNC: 16 U/L (ref 15–37)
BASOPHILS # BLD: 0.03 K/UL (ref 0–0.1)
BASOPHILS NFR BLD: 0.7 % (ref 0–1)
BILIRUB SERPL-MCNC: 0.5 MG/DL (ref 0.2–1)
BUN SERPL-MCNC: 15 MG/DL (ref 6–20)
BUN/CREAT SERPL: 20 (ref 12–20)
CALCIUM SERPL-MCNC: 9.2 MG/DL (ref 8.5–10.1)
CHLORIDE SERPL-SCNC: 108 MMOL/L (ref 97–108)
CHOLEST SERPL-MCNC: 194 MG/DL
CO2 SERPL-SCNC: 29 MMOL/L (ref 21–32)
CREAT SERPL-MCNC: 0.74 MG/DL (ref 0.55–1.02)
DIFFERENTIAL METHOD BLD: ABNORMAL
EOSINOPHIL # BLD: 0.07 K/UL (ref 0–0.4)
EOSINOPHIL NFR BLD: 1.7 % (ref 0–7)
ERYTHROCYTE [DISTWIDTH] IN BLOOD BY AUTOMATED COUNT: 13.2 % (ref 11.5–14.5)
FERRITIN SERPL-MCNC: 19 NG/ML (ref 8–252)
GLOBULIN SER CALC-MCNC: 3.1 G/DL (ref 2–4)
GLUCOSE SERPL-MCNC: 91 MG/DL (ref 65–100)
HCT VFR BLD AUTO: 41.9 % (ref 35–47)
HDLC SERPL-MCNC: 79 MG/DL
HDLC SERPL: 2.5 (ref 0–5)
HGB BLD-MCNC: 13 G/DL (ref 11.5–16)
IMM GRANULOCYTES # BLD AUTO: 0.01 K/UL (ref 0–0.04)
IMM GRANULOCYTES NFR BLD AUTO: 0.2 % (ref 0–0.5)
IRON SATN MFR SERPL: 24 % (ref 20–50)
IRON SERPL-MCNC: 72 UG/DL (ref 35–150)
LDLC SERPL CALC-MCNC: 105 MG/DL (ref 0–100)
LYMPHOCYTES # BLD: 1.85 K/UL (ref 0.8–3.5)
LYMPHOCYTES NFR BLD: 45 % (ref 12–49)
MCH RBC QN AUTO: 30.7 PG (ref 26–34)
MCHC RBC AUTO-ENTMCNC: 31 G/DL (ref 30–36.5)
MCV RBC AUTO: 99.1 FL (ref 80–99)
MONOCYTES # BLD: 0.3 K/UL (ref 0–1)
MONOCYTES NFR BLD: 7.3 % (ref 5–13)
NEUTS SEG # BLD: 1.85 K/UL (ref 1.8–8)
NEUTS SEG NFR BLD: 45.1 % (ref 32–75)
NRBC # BLD: 0 K/UL (ref 0–0.01)
NRBC BLD-RTO: 0 PER 100 WBC
PLATELET # BLD AUTO: 312 K/UL (ref 150–400)
PMV BLD AUTO: 9.9 FL (ref 8.9–12.9)
POTASSIUM SERPL-SCNC: 4.7 MMOL/L (ref 3.5–5.1)
PROT SERPL-MCNC: 6.9 G/DL (ref 6.4–8.2)
RBC # BLD AUTO: 4.23 M/UL (ref 3.8–5.2)
SODIUM SERPL-SCNC: 139 MMOL/L (ref 136–145)
T4 FREE SERPL-MCNC: 1.2 NG/DL (ref 0.8–1.5)
TIBC SERPL-MCNC: 298 UG/DL (ref 250–450)
TRIGL SERPL-MCNC: 50 MG/DL
TSH SERPL DL<=0.05 MIU/L-ACNC: 0.84 UIU/ML (ref 0.36–3.74)
VLDLC SERPL CALC-MCNC: 10 MG/DL
WBC # BLD AUTO: 4.1 K/UL (ref 3.6–11)

## 2025-06-23 PROCEDURE — 1090F PRES/ABSN URINE INCON ASSESS: CPT | Performed by: INTERNAL MEDICINE

## 2025-06-23 PROCEDURE — 3017F COLORECTAL CA SCREEN DOC REV: CPT | Performed by: INTERNAL MEDICINE

## 2025-06-23 PROCEDURE — G8427 DOCREV CUR MEDS BY ELIG CLIN: HCPCS | Performed by: INTERNAL MEDICINE

## 2025-06-23 PROCEDURE — 99214 OFFICE O/P EST MOD 30 MIN: CPT | Performed by: INTERNAL MEDICINE

## 2025-06-23 PROCEDURE — 1160F RVW MEDS BY RX/DR IN RCRD: CPT | Performed by: INTERNAL MEDICINE

## 2025-06-23 PROCEDURE — 1159F MED LIST DOCD IN RCRD: CPT | Performed by: INTERNAL MEDICINE

## 2025-06-23 PROCEDURE — G0439 PPPS, SUBSEQ VISIT: HCPCS | Performed by: INTERNAL MEDICINE

## 2025-06-23 PROCEDURE — 1123F ACP DISCUSS/DSCN MKR DOCD: CPT | Performed by: INTERNAL MEDICINE

## 2025-06-23 PROCEDURE — G8420 CALC BMI NORM PARAMETERS: HCPCS | Performed by: INTERNAL MEDICINE

## 2025-06-23 PROCEDURE — G8400 PT W/DXA NO RESULTS DOC: HCPCS | Performed by: INTERNAL MEDICINE

## 2025-06-23 PROCEDURE — 1036F TOBACCO NON-USER: CPT | Performed by: INTERNAL MEDICINE

## 2025-06-23 RX ORDER — MULTIVITAMIN WITH IRON
1 TABLET ORAL DAILY
COMMUNITY

## 2025-06-23 SDOH — ECONOMIC STABILITY: FOOD INSECURITY: WITHIN THE PAST 12 MONTHS, YOU WORRIED THAT YOUR FOOD WOULD RUN OUT BEFORE YOU GOT MONEY TO BUY MORE.: NEVER TRUE

## 2025-06-23 SDOH — ECONOMIC STABILITY: FOOD INSECURITY: WITHIN THE PAST 12 MONTHS, THE FOOD YOU BOUGHT JUST DIDN'T LAST AND YOU DIDN'T HAVE MONEY TO GET MORE.: NEVER TRUE

## 2025-06-23 ASSESSMENT — PATIENT HEALTH QUESTIONNAIRE - PHQ9
SUM OF ALL RESPONSES TO PHQ QUESTIONS 1-9: 0
2. FEELING DOWN, DEPRESSED OR HOPELESS: NOT AT ALL
SUM OF ALL RESPONSES TO PHQ QUESTIONS 1-9: 0
1. LITTLE INTEREST OR PLEASURE IN DOING THINGS: NOT AT ALL

## 2025-06-23 ASSESSMENT — LIFESTYLE VARIABLES
HOW MANY STANDARD DRINKS CONTAINING ALCOHOL DO YOU HAVE ON A TYPICAL DAY: 1 OR 2
HOW OFTEN DO YOU HAVE A DRINK CONTAINING ALCOHOL: MONTHLY OR LESS

## 2025-06-23 NOTE — PROGRESS NOTES
Katharine Temple (:  1960) is a 65 y.o. female, Established patient, here for evaluation of the following chief complaint(s):  Medicare AWV         Assessment & Plan  1 Medicare wellness     2. Iron deficiency:  - Reported low hemoglobin level of 8 during recent blood donation attempt, reports fatigue, denies bleeding.  I will repeat CBC to check. Patient does not appear anemic on exam.   - Started taking iron supplements and increasing red meat intake  - Complete blood count (CBC) will be ordered to monitor iron levels  - Advised to continue iron supplements and dietary adjustments    3. Hypothyroidism:  - History of Hashimoto's disease, currently on thyroid medication  - Concerns about effectiveness of current thyroid medication due to recent brand change  - Thyroid function test will be ordered to assess TSH and T4 levels  - Advised to continue current thyroid medication and follow up with any concerns    4. Health maintenance:  - Mammogram done in 2024, needs to get another one  - D[facility] scan done at [facility]  - Avoiding excessive calcium intake due to susceptibility to kidney stones  - Takes a multivitamin; vitamin D levels were within the normal range as of 2024  - Lipid panel, CBC, and kidney and liver function tests will be ordered    Results  - Labs:    - Hemoglobin: 8 g/dL    - Imaging:    - MRA of the head (2025):      - Previous stent      - Cystic coil embolization of left posterior communicating artery is patent  1. Medicare annual wellness visit, subsequent  2. Hypothyroidism due to Hashimoto's thyroiditis  -     ESTABLISHED, MOD MDM, 30-39 MIN [96946]  -     TSH; Future  -     T4, Free; Future  -     Comprehensive Metabolic Panel; Future  3. High cholesterol  -     ESTABLISHED, MOD MDM, 30-39 MIN [99032]  -     Lipid Panel; Future  4. Vitamin D deficiency  -     Comprehensive Metabolic Panel; Future  -     Vitamin D 25 Hydroxy; Future  5. Iron deficiency anemia due to

## 2025-06-26 ENCOUNTER — OFFICE VISIT (OUTPATIENT)
Age: 65
End: 2025-06-26
Payer: MEDICARE

## 2025-06-26 VITALS
HEART RATE: 57 BPM | DIASTOLIC BLOOD PRESSURE: 60 MMHG | BODY MASS INDEX: 20.98 KG/M2 | OXYGEN SATURATION: 2 % | HEIGHT: 62 IN | TEMPERATURE: 97.2 F | WEIGHT: 114 LBS | SYSTOLIC BLOOD PRESSURE: 110 MMHG

## 2025-06-26 DIAGNOSIS — I67.1 CEREBRAL ANEURYSM: Primary | ICD-10-CM

## 2025-06-26 PROCEDURE — 99214 OFFICE O/P EST MOD 30 MIN: CPT | Performed by: RADIOLOGY

## 2025-06-26 PROCEDURE — G8427 DOCREV CUR MEDS BY ELIG CLIN: HCPCS | Performed by: RADIOLOGY

## 2025-06-26 PROCEDURE — 1036F TOBACCO NON-USER: CPT | Performed by: RADIOLOGY

## 2025-06-26 PROCEDURE — 1090F PRES/ABSN URINE INCON ASSESS: CPT | Performed by: RADIOLOGY

## 2025-06-26 PROCEDURE — G8420 CALC BMI NORM PARAMETERS: HCPCS | Performed by: RADIOLOGY

## 2025-06-26 PROCEDURE — 1123F ACP DISCUSS/DSCN MKR DOCD: CPT | Performed by: RADIOLOGY

## 2025-06-26 PROCEDURE — 3017F COLORECTAL CA SCREEN DOC REV: CPT | Performed by: RADIOLOGY

## 2025-06-26 PROCEDURE — G8400 PT W/DXA NO RESULTS DOC: HCPCS | Performed by: RADIOLOGY

## 2025-06-26 PROCEDURE — 1159F MED LIST DOCD IN RCRD: CPT | Performed by: RADIOLOGY

## 2025-06-26 NOTE — PROGRESS NOTES
Clinic Note      Patient: Katharine Temple MRN: [id number]  SSN: [id number]    YOB: 1960  Age: 65 y.o.  Sex: female      Subjective:      Katharine Temple is a 65 y.o. 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     Patient has been doing really well since her last visit.  She denies headaches and any focal neurological symptoms.  She has been playing [employer] regularly and traveling.  She continues to take aspirin daily.    Past Medical History:   Diagnosis Date    Aneurysm 06/15/2020    Coiled and stented    Anxiety     At risk for malignant hyperthermia     MH PRECAUTIONS    Gallbladder polyp     Hearing loss 3/1/23    right ear due to an infection    Hypothyroid     Neurological disorder     Ringing in ears      Past Surgical History:   Procedure Laterality Date    BRAIN SURGERY  6/15/20    aneurysm    COLONOSCOPY N/A 2024    COLONOSCOPY BIOPSY performed by Glenda Lopes MD at [facility] ENDOSCOPY    GYN      OVARIAN CYST REMOVAL      OVARIAN CYST REMOVAL Right       Family History   Problem Relation Age of Onset    Hypertension Mother     Dementia Father     Colon Cancer Father 87    No Known Problems Sister     No Known Problems Brother     Malig Hypertherm Maternal Aunt     No Known Problems Maternal Uncle     Parkinson's Disease Paternal Aunt     Alzheimer's Disease Paternal Uncle     Diabetes Maternal Grandmother     Malig Hypertherm Maternal Grandmother     Cancer Maternal Grandfather     Cancer Paternal Grandmother     Heart Attack Paternal Grandfather     Lupus Paternal Cousin          21 age 63    Parkinsonism Paternal Aunt      Social History     Tobacco Use    Smoking status: Never    Smokeless tobacco: Never   Substance Use

## 2025-06-26 NOTE — PATIENT INSTRUCTIONS
Follow up in 3 years, May 2028, after imaging is completed.  See attached paperwork to schedule imaging.

## 2025-06-26 NOTE — PROGRESS NOTES
Follow up for Cerebral aneurysm and imaging review.  Patient reports no symptoms.  Denies headaches, dizziness,, numbness or tingling, blurred or double vision.

## 2025-08-13 ENCOUNTER — PATIENT MESSAGE (OUTPATIENT)
Age: 65
End: 2025-08-13

## 2025-08-13 DIAGNOSIS — E06.3 HYPOTHYROIDISM DUE TO HASHIMOTO'S THYROIDITIS: ICD-10-CM

## 2025-08-15 RX ORDER — LEVOTHYROXINE SODIUM 75 UG/1
75 TABLET ORAL DAILY
Qty: 90 TABLET | Refills: 3 | Status: SHIPPED | OUTPATIENT
Start: 2025-08-15

## (undated) DEVICE — IV START KIT: Brand: MEDLINE

## (undated) DEVICE — ENDOSCOPIC KIT COMPLIANCE ENDOKIT

## (undated) DEVICE — CUFF BLD PRSS AD CLTH SGL TB W/ BAYNT CONN ROUNDED CORNER

## (undated) DEVICE — ELECTRODE PT RET AD L9FT HI MOIST COND ADH HYDRGEL CORDED

## (undated) DEVICE — SET GRAV CK VLV NEEDLESS ST 3 GANGED 4WAY STPCOCK HI FLO 10

## (undated) DEVICE — TIP SUCT TRNSPAR RIB SURF STD BLB RIG NVENT W/ 5IN1 CONN DYND50138] MEDLINE INDUSTRIES INC]